# Patient Record
Sex: MALE | Race: OTHER | NOT HISPANIC OR LATINO
[De-identification: names, ages, dates, MRNs, and addresses within clinical notes are randomized per-mention and may not be internally consistent; named-entity substitution may affect disease eponyms.]

---

## 2018-11-08 PROBLEM — Z00.00 ENCOUNTER FOR PREVENTIVE HEALTH EXAMINATION: Status: ACTIVE | Noted: 2018-11-08

## 2018-11-14 ENCOUNTER — APPOINTMENT (OUTPATIENT)
Dept: SURGERY | Facility: CLINIC | Age: 58
End: 2018-11-14
Payer: COMMERCIAL

## 2018-11-14 VITALS
BODY MASS INDEX: 24.75 KG/M2 | OXYGEN SATURATION: 96 % | TEMPERATURE: 97.8 F | WEIGHT: 154 LBS | HEIGHT: 66 IN | DIASTOLIC BLOOD PRESSURE: 89 MMHG | SYSTOLIC BLOOD PRESSURE: 154 MMHG | HEART RATE: 85 BPM

## 2018-11-14 DIAGNOSIS — Z86.79 PERSONAL HISTORY OF OTHER DISEASES OF THE CIRCULATORY SYSTEM: ICD-10-CM

## 2018-11-14 DIAGNOSIS — Z87.19 PERSONAL HISTORY OF OTHER DISEASES OF THE DIGESTIVE SYSTEM: ICD-10-CM

## 2018-11-14 DIAGNOSIS — Z80.41 FAMILY HISTORY OF MALIGNANT NEOPLASM OF OVARY: ICD-10-CM

## 2018-11-14 DIAGNOSIS — K21.9 GASTRO-ESOPHAGEAL REFLUX DISEASE W/OUT ESOPHAGITIS: ICD-10-CM

## 2018-11-14 DIAGNOSIS — Z78.9 OTHER SPECIFIED HEALTH STATUS: ICD-10-CM

## 2018-11-14 DIAGNOSIS — Z86.39 PERSONAL HISTORY OF OTHER ENDOCRINE, NUTRITIONAL AND METABOLIC DISEASE: ICD-10-CM

## 2018-11-14 PROCEDURE — 99203 OFFICE O/P NEW LOW 30 MIN: CPT

## 2018-11-15 ENCOUNTER — OUTPATIENT (OUTPATIENT)
Dept: OUTPATIENT SERVICES | Facility: HOSPITAL | Age: 58
LOS: 1 days | End: 2018-11-15
Payer: COMMERCIAL

## 2018-11-15 ENCOUNTER — APPOINTMENT (OUTPATIENT)
Dept: ULTRASOUND IMAGING | Facility: CLINIC | Age: 58
End: 2018-11-15

## 2018-11-15 ENCOUNTER — APPOINTMENT (OUTPATIENT)
Dept: RADIOLOGY | Facility: HOSPITAL | Age: 58
End: 2018-11-15
Payer: COMMERCIAL

## 2018-11-15 ENCOUNTER — APPOINTMENT (OUTPATIENT)
Dept: ULTRASOUND IMAGING | Facility: HOSPITAL | Age: 58
End: 2018-11-15
Payer: COMMERCIAL

## 2018-11-15 DIAGNOSIS — K80.20 CALCULUS OF GALLBLADDER WITHOUT CHOLECYSTITIS WITHOUT OBSTRUCTION: ICD-10-CM

## 2018-11-15 PROCEDURE — 74241: CPT | Mod: 26

## 2018-11-15 PROCEDURE — 74241: CPT

## 2018-11-15 PROCEDURE — 76700 US EXAM ABDOM COMPLETE: CPT | Mod: 26

## 2018-11-15 PROCEDURE — 76700 US EXAM ABDOM COMPLETE: CPT

## 2018-11-27 RX ORDER — LEVOTHYROXINE SODIUM 0.11 MG/1
112 TABLET ORAL
Refills: 0 | Status: ACTIVE | COMMUNITY

## 2018-11-27 RX ORDER — VALSARTAN 80 MG/1
80 TABLET, COATED ORAL
Refills: 0 | Status: ACTIVE | COMMUNITY

## 2018-11-27 RX ORDER — OLOPATADINE HYDROCHLORIDE 1 MG/ML
0.1 SOLUTION/ DROPS OPHTHALMIC
Refills: 0 | Status: ACTIVE | COMMUNITY

## 2018-11-27 RX ORDER — ATORVASTATIN CALCIUM 20 MG/1
20 TABLET, FILM COATED ORAL
Refills: 0 | Status: ACTIVE | COMMUNITY

## 2018-11-27 RX ORDER — LOSARTAN POTASSIUM 100 MG/1
100 TABLET, FILM COATED ORAL
Refills: 0 | Status: ACTIVE | COMMUNITY

## 2018-11-27 RX ORDER — SUCRALFATE 1 G/1
1 TABLET ORAL
Refills: 0 | Status: ACTIVE | COMMUNITY

## 2018-11-29 ENCOUNTER — TRANSCRIPTION ENCOUNTER (OUTPATIENT)
Age: 58
End: 2018-11-29

## 2018-11-30 ENCOUNTER — CLINICAL ADVICE (OUTPATIENT)
Age: 58
End: 2018-11-30

## 2018-11-30 DIAGNOSIS — R18.8 OTHER ASCITES: ICD-10-CM

## 2018-11-30 DIAGNOSIS — K80.20 CALCULUS OF GALLBLADDER W/OUT CHOLECYSTITIS W/OUT OBSTRUCTION: ICD-10-CM

## 2018-11-30 PROBLEM — Z78.9 NON-SMOKER: Status: ACTIVE | Noted: 2018-11-14

## 2018-11-30 PROBLEM — Z86.39 HISTORY OF DIABETES MELLITUS: Status: RESOLVED | Noted: 2018-11-14 | Resolved: 2018-11-30

## 2018-11-30 PROBLEM — Z80.41 FAMILY HISTORY OF MALIGNANT NEOPLASM OF OVARY: Status: ACTIVE | Noted: 2018-11-14

## 2018-11-30 PROBLEM — K21.9 GASTROESOPHAGEAL REFLUX DISEASE WITHOUT ESOPHAGITIS: Status: ACTIVE | Noted: 2018-11-27

## 2018-11-30 PROBLEM — Z86.39 HISTORY OF THYROID DISORDER: Status: RESOLVED | Noted: 2018-11-14 | Resolved: 2018-11-30

## 2018-11-30 PROBLEM — Z86.79 HISTORY OF HYPERTENSION: Status: RESOLVED | Noted: 2018-11-14 | Resolved: 2018-11-30

## 2018-11-30 PROBLEM — Z87.19 HISTORY OF GASTRITIS: Status: RESOLVED | Noted: 2018-11-14 | Resolved: 2018-11-30

## 2018-12-03 PROBLEM — K80.20 SYMPTOMATIC CHOLELITHIASIS: Status: ACTIVE | Noted: 2018-11-30

## 2018-12-05 ENCOUNTER — APPOINTMENT (OUTPATIENT)
Dept: SURGERY | Facility: CLINIC | Age: 58
End: 2018-12-05
Payer: COMMERCIAL

## 2018-12-05 VITALS
HEIGHT: 66 IN | HEART RATE: 81 BPM | BODY MASS INDEX: 23.63 KG/M2 | TEMPERATURE: 97.8 F | OXYGEN SATURATION: 98 % | SYSTOLIC BLOOD PRESSURE: 135 MMHG | WEIGHT: 147 LBS | DIASTOLIC BLOOD PRESSURE: 83 MMHG

## 2018-12-05 DIAGNOSIS — K80.20 CALCULUS OF GALLBLADDER W/OUT CHOLECYSTITIS W/OUT OBSTRUCTION: ICD-10-CM

## 2018-12-05 PROCEDURE — 99212 OFFICE O/P EST SF 10 MIN: CPT

## 2018-12-11 ENCOUNTER — OUTPATIENT (OUTPATIENT)
Dept: OUTPATIENT SERVICES | Facility: HOSPITAL | Age: 58
LOS: 1 days | End: 2018-12-11
Payer: COMMERCIAL

## 2018-12-11 ENCOUNTER — APPOINTMENT (OUTPATIENT)
Dept: CT IMAGING | Facility: HOSPITAL | Age: 58
End: 2018-12-11
Payer: COMMERCIAL

## 2018-12-11 DIAGNOSIS — R18.8 OTHER ASCITES: ICD-10-CM

## 2018-12-11 PROCEDURE — 74177 CT ABD & PELVIS W/CONTRAST: CPT | Mod: 26

## 2018-12-11 PROCEDURE — 74177 CT ABD & PELVIS W/CONTRAST: CPT

## 2018-12-12 ENCOUNTER — APPOINTMENT (OUTPATIENT)
Dept: SURGICAL ONCOLOGY | Facility: CLINIC | Age: 58
End: 2018-12-12
Payer: COMMERCIAL

## 2018-12-12 VITALS
OXYGEN SATURATION: 96 % | HEART RATE: 72 BPM | TEMPERATURE: 98.4 F | BODY MASS INDEX: 23.63 KG/M2 | WEIGHT: 147 LBS | HEIGHT: 66 IN | SYSTOLIC BLOOD PRESSURE: 137 MMHG | DIASTOLIC BLOOD PRESSURE: 81 MMHG

## 2018-12-12 DIAGNOSIS — Z80.3 FAMILY HISTORY OF MALIGNANT NEOPLASM OF BREAST: ICD-10-CM

## 2018-12-12 DIAGNOSIS — Z84.81 FAMILY HISTORY OF CARRIER OF GENETIC DISEASE: ICD-10-CM

## 2018-12-12 PROCEDURE — 99204 OFFICE O/P NEW MOD 45 MIN: CPT

## 2018-12-12 PROCEDURE — 99214 OFFICE O/P EST MOD 30 MIN: CPT

## 2018-12-28 ENCOUNTER — APPOINTMENT (OUTPATIENT)
Dept: CT IMAGING | Facility: HOSPITAL | Age: 58
End: 2018-12-28

## 2019-01-03 ENCOUNTER — RESULT REVIEW (OUTPATIENT)
Age: 59
End: 2019-01-03

## 2019-01-03 ENCOUNTER — OUTPATIENT (OUTPATIENT)
Dept: OUTPATIENT SERVICES | Facility: HOSPITAL | Age: 59
LOS: 1 days | Discharge: HOME | End: 2019-01-03
Payer: COMMERCIAL

## 2019-01-03 DIAGNOSIS — K38.8 OTHER SPECIFIED DISEASES OF APPENDIX: ICD-10-CM

## 2019-01-03 LAB
BASOPHILS # BLD AUTO: 0.04 K/UL
BASOPHILS NFR BLD AUTO: 0.6 %
EOSINOPHIL # BLD AUTO: 0.24 K/UL
EOSINOPHIL NFR BLD AUTO: 3.3 %
GLUCOSE BLDC GLUCOMTR-MCNC: 74 MG/DL — SIGNIFICANT CHANGE UP (ref 70–99)
HCT VFR BLD CALC: 39 %
HGB BLD-MCNC: 12.5 G/DL
IMM GRANULOCYTES NFR BLD AUTO: 0.1 %
INR PPP: 1.22 RATIO
LYMPHOCYTES # BLD AUTO: 1.61 K/UL
LYMPHOCYTES NFR BLD AUTO: 22.2 %
MAN DIFF?: NORMAL
MCHC RBC-ENTMCNC: 26.9 PG
MCHC RBC-ENTMCNC: 32.1 GM/DL
MCV RBC AUTO: 83.9 FL
MONOCYTES # BLD AUTO: 0.78 K/UL
MONOCYTES NFR BLD AUTO: 10.8 %
NEUTROPHILS # BLD AUTO: 4.57 K/UL
NEUTROPHILS NFR BLD AUTO: 63 %
PLATELET # BLD AUTO: 388 K/UL
PT BLD: 13.8 SEC
RBC # BLD: 4.65 M/UL
RBC # FLD: 15.3 %
WBC # FLD AUTO: 7.25 K/UL

## 2019-01-03 PROCEDURE — 88305 TISSUE EXAM BY PATHOLOGIST: CPT | Mod: 26

## 2019-01-07 LAB — SURGICAL PATHOLOGY STUDY: SIGNIFICANT CHANGE UP

## 2019-01-16 ENCOUNTER — APPOINTMENT (OUTPATIENT)
Dept: SURGICAL ONCOLOGY | Facility: CLINIC | Age: 59
End: 2019-01-16
Payer: COMMERCIAL

## 2019-01-16 VITALS
OXYGEN SATURATION: 97 % | DIASTOLIC BLOOD PRESSURE: 84 MMHG | WEIGHT: 145 LBS | BODY MASS INDEX: 23.3 KG/M2 | TEMPERATURE: 97.7 F | SYSTOLIC BLOOD PRESSURE: 126 MMHG | HEIGHT: 66 IN | HEART RATE: 89 BPM

## 2019-01-16 DIAGNOSIS — K38.8 OTHER SPECIFIED DISEASES OF APPENDIX: ICD-10-CM

## 2019-01-16 DIAGNOSIS — C78.6 SECONDARY MALIGNANT NEOPLASM OF RETROPERITONEUM AND PERITONEUM: ICD-10-CM

## 2019-01-16 PROCEDURE — 99215 OFFICE O/P EST HI 40 MIN: CPT

## 2019-01-18 ENCOUNTER — APPOINTMENT (OUTPATIENT)
Dept: SURGERY | Facility: CLINIC | Age: 59
End: 2019-01-18

## 2019-01-22 NOTE — HISTORY OF PRESENT ILLNESS
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 \par \par \par He underwent a screening colonoscopy with Dr. Kate Irby on 1/3/19 where he was found to have a descending colon polyp, cecal polyp and a descending polyp with benign pathology. Cecal and terminal biopsy was also benign. \par \par PERTINENT HISTORY:\par Initially consulted 12/12/18, referred by Dr. Todd Escalona. \par The patient was seen by Dr. Escalona in November for mild discomfort to the RUQ for many years and significant GERD despite PPI treatment.  Patient is from hospitals. \par \par Abdominal ultrasound performed on 11/15/18 showed moderate amount of very complex ascites within the right and left abdomen causing mass effect and scalloping of the capsule of the liver. CT was then recommended. No evidence of cholecystitis. \par \par Upper GI series performed on 11/15/18 demonstrated a small hiatal hernia and severe GERD. \par \par CT A/P performed on 12/11/18 for Ascites revealed -FINDINGS: Limited sections through the lung bases demonstrate mild  bilateral atelectasis. There is a small bleb in the left lower lobe.  There is a dilated fluid-filled structure adjacent to and inseparable  from the cecum measuring up to 4.5 cm in diameter, suggestive of mucocele  of the appendix. Extensive complete free fluid is noted within the  abdomen and pelvis. There is scalloping at the liver capsule. 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 There is a tiny hypodense lesion in the liver dome, too small  to characterize. Nonspecific subcapsular a 9 mm hyperdense focus in  hepatic segment 4.  Gallstones in the gallbladder. The pancreas, spleen, adrenals, and kidney  appear unremarkable.  No bowel obstruction or grossly thickened bowel. Moderate amount of stool  in the rectum.  No evidence for free air, or enlarged lymph node.   The urinary bladder appears unremarkable. The prostate is enlarged.  CT IMPRESSION: Findings are suggestive of mucocele of the appendix with  associated malignant pseudomyxoma peritonei and associated hepatic  capsular metastatic implants.   9 mm nonspecific hyperdense focus in hepatic segment 4. If clinically  indicated, abdominal MR without and with IV contrast may be pursued for  further evaluation.\par \par PMH notable for HTN, HLD, total thyroidectomy for reportedly benign thyroid nodules 2013, DM2 (on Metformin), knee surgery 1996, gallstones since 1998. Social alcohol use.  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 \par \par Family history of malignancies involves his mother with ovarian cancer at age 72. His 1 sister underwent genetic testing and was found to be BRCA negative. He states that same sister also had a benign pancreatic tumor removed.  His other sister was diagnosed with breast cancer at age 38, s/p chemo and surgery and tested positive for BRCA gene. He denies any other family history of malignancies. \par \par States he had a colonoscopy in 2/2017 and had 3 polyps excised. He also reports a history of H. Pylori for which he was treated for and most recent endoscopy showed gastritis but no H. Pylori. \par \par Today he is with c/o mild/dull, 4/10, intermittent right upper quadrant abdominal pain since 2016. The pain is worse with heavy and fatty meals.  He also repots early satiety, 4 lb weight loss, occasional abdominal bloating. Denies changes in appetite or bowel habits. Denies nausea or vomiting. States he recently retired and his sleeping patterns have been thrown off. He states he now sleeps during the day and stays up a lot of the night.

## 2019-01-22 NOTE — REASON FOR VISIT
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 \par  \par

## 2019-01-22 NOTE — PHYSICAL EXAM
[Normal] : supple, no neck mass and thyroid not enlarged [Normal Groin Lymph Nodes] : normal groin lymph nodes [Normal] : oriented to person, place and time, with appropriate affect [de-identified] : soft, ND, NT

## 2019-01-22 NOTE — ASSESSMENT
[FreeTextEntry1] : IMP:\par CT revealing Mucocele of the appendix with  associated  pseudomyxoma peritonei and associated hepatic  capsular implants. He underwent a screening colonoscopy with Dr. Kate Irby on 1/3/19 where he was found to have a descending colon polyp, cecal polyp and a descending polyp with benign pathology. Cecal and terminal ileum biopsy was also benign. \par \par \par PLAN:\par Discussed with - pt PCP for clearance. \par Will order PET scan to rule out distant disease\par Will schedule for exlap right colectomy, cytoreductive surgery and HIPEC\par I have discussed the diagnosis, therapeutic plan and options with the patient at length. Patient expressed verbal understanding to proceed with proposed plan. All questions answered.\par I have discussed the risks, benefits, alternatives, complications including but not limited bleeding, infection, damage to adjacent structures, recurrence to the patient in detail. Patient expressed verbal understanding. Written informed consent to be obtained in the preoperative period.\par

## 2019-01-22 NOTE — CONSULT LETTER
[Dear  ___] : Dear  [unfilled], [Consult Letter:] : I had the pleasure of evaluating your patient, [unfilled]. [( Thank you for referring [unfilled] for consultation for _____ )] : Thank you for referring [unfilled] for consultation for [unfilled] [Please see my note below.] : Please see my note below. [Consult Closing:] : Thank you very much for allowing me to participate in the care of this patient.  If you have any questions, please do not hesitate to contact me. [Sincerely,] : Sincerely, [DrHannah  ___] : Dr. HELMS [FreeTextEntry3] : Rik Qureshi MD, FICS, FACS\par , Surgical Oncology\par [facility]\par [address]\[place], [place]- [address]\par \par [address]\[place]- [address]\par \par (mob) [phone]\par (o) [phone]\par (f) [phone]\par

## 2019-01-31 ENCOUNTER — FORM ENCOUNTER (OUTPATIENT)
Age: 59
End: 2019-01-31

## 2019-02-01 ENCOUNTER — APPOINTMENT (OUTPATIENT)
Dept: NUCLEAR MEDICINE | Facility: IMAGING CENTER | Age: 59
End: 2019-02-01
Payer: COMMERCIAL

## 2019-02-01 ENCOUNTER — OUTPATIENT (OUTPATIENT)
Dept: OUTPATIENT SERVICES | Facility: HOSPITAL | Age: 59
LOS: 1 days | End: 2019-02-01
Payer: COMMERCIAL

## 2019-02-01 DIAGNOSIS — C78.6 SECONDARY MALIGNANT NEOPLASM OF RETROPERITONEUM AND PERITONEUM: ICD-10-CM

## 2019-02-01 PROCEDURE — 78815 PET IMAGE W/CT SKULL-THIGH: CPT

## 2019-02-01 PROCEDURE — 78815 PET IMAGE W/CT SKULL-THIGH: CPT | Mod: 26,PI

## 2019-02-01 PROCEDURE — A9552: CPT

## 2019-02-04 RX ORDER — POLYETHYLENE GLYCOL 3350, SODIUM SULFATE, SODIUM CHLORIDE, POTASSIUM CHLORIDE, ASCORBIC ACID, SODIUM ASCORBATE 7.5-2.691G
100 KIT ORAL
Qty: 1 | Refills: 0 | Status: ACTIVE | COMMUNITY
Start: 2019-02-04 | End: 1900-01-01

## 2019-02-04 RX ORDER — METRONIDAZOLE 250 MG/1
250 TABLET ORAL AS DIRECTED
Qty: 3 | Refills: 0 | Status: ACTIVE | COMMUNITY
Start: 2019-02-04 | End: 1900-01-01

## 2019-02-08 ENCOUNTER — OUTPATIENT (OUTPATIENT)
Dept: OUTPATIENT SERVICES | Facility: HOSPITAL | Age: 59
LOS: 1 days | End: 2019-02-08

## 2019-02-08 VITALS
HEART RATE: 107 BPM | SYSTOLIC BLOOD PRESSURE: 118 MMHG | OXYGEN SATURATION: 99 % | WEIGHT: 149.03 LBS | DIASTOLIC BLOOD PRESSURE: 80 MMHG | RESPIRATION RATE: 16 BRPM | HEIGHT: 67 IN | TEMPERATURE: 98 F

## 2019-02-08 DIAGNOSIS — C78.6 SECONDARY MALIGNANT NEOPLASM OF RETROPERITONEUM AND PERITONEUM: ICD-10-CM

## 2019-02-08 DIAGNOSIS — Z98.890 OTHER SPECIFIED POSTPROCEDURAL STATES: Chronic | ICD-10-CM

## 2019-02-08 DIAGNOSIS — E11.9 TYPE 2 DIABETES MELLITUS WITHOUT COMPLICATIONS: ICD-10-CM

## 2019-02-08 DIAGNOSIS — R06.83 SNORING: ICD-10-CM

## 2019-02-08 DIAGNOSIS — E03.9 HYPOTHYROIDISM, UNSPECIFIED: ICD-10-CM

## 2019-02-08 LAB
BLD GP AB SCN SERPL QL: NEGATIVE — SIGNIFICANT CHANGE UP
RH IG SCN BLD-IMP: POSITIVE — SIGNIFICANT CHANGE UP

## 2019-02-08 RX ORDER — SODIUM CHLORIDE 9 MG/ML
1000 INJECTION, SOLUTION INTRAVENOUS
Qty: 0 | Refills: 0 | Status: DISCONTINUED | OUTPATIENT
Start: 2019-02-22 | End: 2019-02-22

## 2019-02-08 RX ORDER — SODIUM CHLORIDE 9 MG/ML
3 INJECTION INTRAMUSCULAR; INTRAVENOUS; SUBCUTANEOUS EVERY 8 HOURS
Qty: 0 | Refills: 0 | Status: DISCONTINUED | OUTPATIENT
Start: 2019-02-22 | End: 2019-03-06

## 2019-02-08 NOTE — H&P PST ADULT - PMH
Diabetes mellitus    Gallstones    Gastritis    Hyperlipidemia    Hypertension    Thyroid nodule  benign  Tuberculosis exposure  1996 - treated with medicaiton 10 months Diabetes mellitus    Gallstones    Gastritis    Hyperlipidemia    Hypertension    Thyroid nodule  benign  Tuberculosis exposure  1996 - treated with medication 10 months

## 2019-02-08 NOTE — H&P PST ADULT - PROBLEM SELECTOR PLAN 1
Exploratory Laparotomy, Right Colectomy Debulking Cholecystectomy, Heated Intraperitoneal Chemotherapy scheduled on 2/22/19.   Pre-op instructions provided. Pt verbalized understanding.   Pepcid provided for GI prophylaxis.   Chlorhexidine wash provided and instructions given.   Pt went for medical clearance per surgeon's request.

## 2019-02-08 NOTE — H&P PST ADULT - PROBLEM SELECTOR PLAN 3
Levels being monitored by PMD.  Pt instructed to take his thyroid medication the morning of surgery.

## 2019-02-08 NOTE — H&P PST ADULT - GIT GEN HX ROS MEA POS PC
abdominal pain/right upper and occasional left upper quardant pain right upper and occasional left upper quadrant pain/abdominal pain

## 2019-02-08 NOTE — H&P PST ADULT - HISTORY OF PRESENT ILLNESS
58 year old male with hx of gallstones with c/o right abdominal pain, had u/s which found fluid in abdomin. Pt had f/u CT scan which revealed "mucocele of appendix with associated malignant pseudomyxoma peritonei" Pt presents today for presurgical evaluation for ... 58 year old male with hx of gallstones with c/o right abdominal pain, had u/s which found fluid in abdomin. Pt had f/u CT scan which revealed "mucocele of appendix with associated malignant pseudomyxoma peritonei" Pt presents today for presurgical evaluation for Exploratory Laparotomy, Right Colectomy Debulking Cholecystectomy, Heated Intraperitoneal Chemotherapy scheduled on 2/22/19.

## 2019-02-08 NOTE — H&P PST ADULT - MUSCULOSKELETAL
details… detailed exam no joint warmth/normal strength/ROM intact/no joint swelling/no joint erythema/no calf tenderness

## 2019-02-14 PROBLEM — K29.70 GASTRITIS, UNSPECIFIED, WITHOUT BLEEDING: Chronic | Status: ACTIVE | Noted: 2019-02-08

## 2019-02-14 PROBLEM — E78.5 HYPERLIPIDEMIA, UNSPECIFIED: Chronic | Status: ACTIVE | Noted: 2019-02-08

## 2019-02-14 PROBLEM — Z20.1 CONTACT WITH AND (SUSPECTED) EXPOSURE TO TUBERCULOSIS: Chronic | Status: ACTIVE | Noted: 2019-02-08

## 2019-02-14 PROBLEM — K80.20 CALCULUS OF GALLBLADDER WITHOUT CHOLECYSTITIS WITHOUT OBSTRUCTION: Chronic | Status: ACTIVE | Noted: 2019-02-08

## 2019-02-19 ENCOUNTER — RX RENEWAL (OUTPATIENT)
Age: 59
End: 2019-02-19

## 2019-02-19 RX ORDER — NEOMYCIN SULFATE 500 MG/1
500 TABLET ORAL
Qty: 6 | Refills: 0 | Status: ACTIVE | COMMUNITY
Start: 2019-02-04 | End: 1900-01-01

## 2019-02-21 ENCOUNTER — TRANSCRIPTION ENCOUNTER (OUTPATIENT)
Age: 59
End: 2019-02-21

## 2019-02-21 NOTE — ASU PATIENT PROFILE, ADULT - PMH
Diabetes mellitus    Gallstones    Gastritis    Hyperlipidemia    Hypertension    Thyroid nodule  benign  Tuberculosis exposure  1996 - treated with medication 10 months

## 2019-02-21 NOTE — ASU PATIENT PROFILE, ADULT - MEDICATION ADMINISTRATION INFO, PROFILE
History  Chief Complaint   Patient presents with    Extremity Weakness     worsening b/l leg weakness for the past couple of days  Patient presents emergency department with weakness  He was going to go to the family doctor's today and when his neighbor went to pick him up they could not get him in the car because he was so weak and so they called 911  Patient reports that his legs just feel very weak and he feels very weak the past couple of days in a keeps getting worse  Patient lives at home alone  Patient denies any other symptoms he is not having any headaches or dizziness  He denies any chest pain or difficulty breathing or any coughing or fevers  Patient was transported via EMS  Room air oxygen was initially in the 60s with high-flow to we got him into the 80s and then ultimately to 90 BiPAP was then started on the patient providing better oxygenation  Discussed case with Dr Roa - jaz also saw and evaluated the patient  No fall, dizziness or syncope  Prior to Admission Medications   Prescriptions Last Dose Informant Patient Reported? Taking?    IRON PO 8/2/2018 at Unknown time Self Yes Yes   Sig: Take 324 mg by mouth 2 (two) times a day     MENTHOL-METHYL SALICYLATE EX 6/4/6412 at Unknown time  Yes Yes   Sig: Apply topically 2 (two) times a day   Nutritional Supplements (ENSURE PLUS) LIQD 8/2/2018 at Unknown time Self Yes Yes   Sig: Take 1 Can by mouth 2 (two) times a day     acetaminophen (TYLENOL) 325 mg tablet Unknown at Unknown time  Yes No   Sig: Take 650 mg by mouth every 6 (six) hours as needed for mild pain   aspirin 325 mg tablet 8/2/2018 at Unknown time Self Yes Yes   Sig: Take 81 mg by mouth daily     bisacodyl (DULCOLAX) 5 mg EC tablet Unknown at Unknown time  Yes No   Sig: Take 5 mg by mouth daily as needed for constipation   cetirizine (ZyrTEC) 5 MG tablet Unknown at Unknown time  Yes No   Sig: Take 5 mg by mouth daily as needed for allergies   docusate sodium (COLACE) 100 mg capsule Unknown at Unknown time  Yes No   Sig: Take 100 mg by mouth daily as needed for constipation   finasteride (PROSCAR) 5 mg tablet 2018 at Unknown time Self Yes Yes   Sig: Take 5 mg by mouth daily   fluticasone (FLONASE) 50 mcg/act nasal spray 2018 at Unknown time  Yes Yes   Si spray into each nostril 2 (two) times a day   latanoprost (XALATAN) 0 005 % ophthalmic solution 2018 at Unknown time Self Yes Yes   Sig: Administer 1 drop into the left eye daily     lisinopril (ZESTRIL) 20 mg tablet 2018 at Unknown time Self Yes Yes   Sig: Take 20 mg by mouth 2 (two) times a day   omeprazole (PriLOSEC) 10 mg delayed release capsule 2018 at Unknown time Self Yes Yes   Sig: Take 20 mg by mouth daily   simvastatin (ZOCOR) 80 mg tablet 2018 at Unknown time Self Yes Yes   Sig: Take 40 mg by mouth daily at bedtime   timolol (TIMOPTIC) 0 25 % ophthalmic solution 2018 at Unknown time Self Yes Yes   Sig: Administer 1 drop into the left eye daily        Facility-Administered Medications: None       Past Medical History:   Diagnosis Date    Anemia     Cardiac disease     GERD (gastroesophageal reflux disease)     History of BPH        History reviewed  No pertinent surgical history  History reviewed  No pertinent family history  I have reviewed and agree with the history as documented  Social History   Substance Use Topics    Smoking status: Never Smoker    Smokeless tobacco: Never Used    Alcohol use No        Review of Systems   All other systems reviewed and are negative  Physical Exam  Physical Exam   Constitutional: He is oriented to person, place, and time  He appears well-developed and well-nourished  HENT:   Head: Normocephalic and atraumatic  Right Ear: External ear normal    Mouth/Throat: Oropharynx is clear and moist    Eyes: Conjunctivae and EOM are normal    Neck: Neck supple  Cardiovascular: Normal rate, regular rhythm and normal heart sounds  Pulmonary/Chest:   Markedly diminished breath sounds throughout  Abdominal: Soft  Bowel sounds are normal    Musculoskeletal: He exhibits edema  Bilateral pitting edema to both extremities  Neurological: He is alert and oriented to person, place, and time  Patient is able to answer all of my questions  Skin: Skin is warm  Psychiatric: He has a normal mood and affect  Nursing note and vitals reviewed        Vital Signs  ED Triage Vitals   Temperature Pulse Respirations Blood Pressure SpO2   08/02/18 1035 08/02/18 1035 08/02/18 1035 08/02/18 1035 08/02/18 1035   98 3 °F (36 8 °C) 64 20 153/68 (!) 66 %      Temp Source Heart Rate Source Patient Position - Orthostatic VS BP Location FiO2 (%)   08/02/18 1035 08/02/18 1035 08/02/18 1035 08/02/18 1035 08/02/18 1100   Oral Monitor Lying Right arm 70      Pain Score       08/02/18 1035       No Pain           Vitals:    08/02/18 1200 08/02/18 1230 08/02/18 1245 08/02/18 1300   BP: 137/63 170/72 135/60 135/60   Pulse: (!) 52 (!) 54 (!) 54 (!) 50   Patient Position - Orthostatic VS: Lying Sitting Lying Lying       Visual Acuity  Visual Acuity      Most Recent Value   L Pupil Size (mm)  3   R Pupil Size (mm)  3          ED Medications  Medications   chlorhexidine (PERIDEX) 0 12 % oral rinse 15 mL (not administered)   heparin (porcine) subcutaneous injection 5,000 Units (not administered)   aspirin tablet 325 mg (not administered)   Iron TABS 324 mg (not administered)   ENSURE PLUS LIQD 237 mL (not administered)   finasteride (PROSCAR) tablet 5 mg (not administered)   latanoprost (XALATAN) 0 005 % ophthalmic solution 1 drop (not administered)   pantoprazole (PROTONIX) EC tablet 20 mg (not administered)   atorvastatin (LIPITOR) tablet 40 mg (not administered)   timolol (TIMOPTIC) 0 25 % ophthalmic solution 1 drop (not administered)   lisinopril (ZESTRIL) tablet 20 mg (not administered)   fluticasone (FLONASE) 50 mcg/act nasal spray 1 spray (not administered) docusate sodium (COLACE) capsule 100 mg (not administered)   bisacodyl (DULCOLAX) EC tablet 5 mg (not administered)   acetaminophen (TYLENOL) tablet 650 mg (not administered)   furosemide (LASIX) injection 40 mg (not administered)   aspirin chewable tablet 243 mg (243 mg Oral Given 8/2/18 1157)   furosemide (LASIX) injection 40 mg (40 mg Intravenous Given 8/2/18 1205)       Diagnostic Studies  Results Reviewed     Procedure Component Value Units Date/Time    Troponin I [[id number]] Collected:  08/02/18 1303    Lab Status:   In process Specimen:  Blood from Arm, Left Updated:  08/02/18 1306    Troponin I [[id number]]     Lab Status:  No result Specimen:  Blood     Platelet count [[id number]]     Lab Status:  No result Specimen:  Blood     Urine Microscopic [[id number]]  (Normal) Collected:  08/02/18 1155    Lab Status:  Final result Specimen:  Urine from Urine, Clean Catch Updated:  08/02/18 1228     RBC, UA None Seen /hpf      WBC, UA None Seen /hpf      Epithelial Cells Occasional /hpf      Bacteria, UA None Seen /hpf     B-type natriuretic peptide [[id number]]  (Abnormal) Collected:  08/02/18 1052    Lab Status:  Final result Specimen:  Blood from Arm, Right Updated:  08/02/18 1155     NT-proBNP 19,286 (H) pg/mL     POCT urinalysis dipstick [[id number]]  (Abnormal) Resulted:  08/02/18 1149    Lab Status:  Final result Specimen:  Urine Updated:  08/02/18 1149    ED Urine Macroscopic [[id number]]  (Abnormal) Collected:  08/02/18 1155    Lab Status:  Final result Specimen:  Urine Updated:  08/02/18 1148     Color, UA Yellow     Clarity, UA Slightly Cloudy     pH, UA 5 0     Leukocytes, UA Negative     Nitrite, UA Negative     Protein, UA 30 (1+) (A) mg/dl      Glucose, UA Negative mg/dl      Ketones, UA Negative mg/dl      Urobilinogen, UA 1 0 E U /dl      Bilirubin, UA Interference- unable to analyze (A)     Blood, UA Negative     Specific Gravity, UA 1 025    Narrative:       CLINITEK RESULT    Troponin I [[id number]]  (Abnormal) Collected:  08/02/18 1051    Lab Status:  Final result Specimen:  Blood from Arm, Right Updated:  08/02/18 1135     Troponin I 0 17 (H) ng/mL     Lactic acid, plasma [[id number]]  (Normal) Collected:  08/02/18 1050    Lab Status:  Final result Specimen:  Blood from Arm, Right Updated:  08/02/18 1134     LACTIC ACID 1 8 mmol/L     Narrative:         Result may be elevated if tourniquet was used during collection  Comprehensive metabolic panel [[id number]]  (Abnormal) Collected:  08/02/18 1052    Lab Status:  Final result Specimen:  Blood from Arm, Right Updated:  08/02/18 1130     Sodium 142 mmol/L      Potassium 5 0 mmol/L      Chloride 102 mmol/L      CO2 37 (H) mmol/L      Anion Gap 3 (L) mmol/L      BUN 29 (H) mg/dL      Creatinine 1 24 mg/dL      Glucose 114 mg/dL      Calcium 8 8 mg/dL      AST 38 U/L      ALT 41 U/L      Alkaline Phosphatase 77 U/L      Total Protein 6 7 g/dL      Albumin 3 0 (L) g/dL      Total Bilirubin 0 51 mg/dL      eGFR 48 ml/min/1 73sq m     Narrative:         National Kidney Disease Education Program recommendations are as follows:  GFR calculation is accurate only with a steady state creatinine  Chronic Kidney disease less than 60 ml/min/1 73 sq  meters  Kidney failure less than 15 ml/min/1 73 sq  meters      Blood gas, arterial [[id number]]  (Abnormal) Collected:  08/02/18 1116    Lab Status:  Final result Specimen:  Blood, Arterial from Radial, Right Updated:  08/02/18 1124     pH, Arterial 7 297 (L)     pCO2, Arterial 68 5 (HH) mm Hg      pO2, Arterial 94 5 mm Hg      HCO3, Arterial 32 7 (H) mmol/L      Base Excess, Arterial 4 0 mmol/L      O2 Content, Arterial 19 1 mL/dL      O2 HGB,Arterial  95 4 %      SOURCE Radial, Right     ASHLEY TEST Yes     Non Vent type BIPAP BIPAP     IPAP 12     EPAP 5     BIPAP fio2 70 %     D-Dimer [[id number]]  (Abnormal) Collected:  08/02/18 1054    Lab Status:  Final result Specimen:  Blood from Arm, Right Updated:  08/02/18 1122     D-Dimer, Quant 717 (H) ng/ml (FEU)     Protime-INR [[id number]]  (Normal) Collected:  08/02/18 1054    Lab Status:  Final result Specimen:  Blood from Arm, Right Updated:  08/02/18 1116     Protime 13 7 seconds      INR 1 04    APTT [[id number]]  (Normal) Collected:  08/02/18 1054    Lab Status:  Final result Specimen:  Blood from Arm, Right Updated:  08/02/18 1116     PTT 34 seconds     CBC and differential [[id number]]  (Abnormal) Collected:  08/02/18 1050    Lab Status:  Final result Specimen:  Blood from Arm, Right Updated:  08/02/18 1107     WBC 8 12 Thousand/uL      RBC 4 94 Million/uL      Hemoglobin 14 4 g/dL      Hematocrit 48 7 %      MCV 99 (H) fL      MCH 29 1 pg      MCHC 29 6 (L) g/dL      RDW 16 0 (H) %      MPV 9 1 fL      Platelets 751 Thousands/uL      Neutrophils Relative 75 %      Lymphocytes Relative 15 %      Monocytes Relative 10 %      Eosinophils Relative 1 %      Basophils Relative 0 %      Neutrophils Absolute 6 09 Thousands/µL      Lymphocytes Absolute 1 20 Thousands/µL      Monocytes Absolute 0 77 Thousand/µL      Eosinophils Absolute 0 05 Thousand/µL      Basophils Absolute 0 01 Thousands/µL     Blood culture #1 [[id number]] Collected:  08/02/18 1100    Lab Status: In process Specimen:  Blood from Arm, Left Updated:  08/02/18 1103    Blood culture #2 [[id number]] Collected:  08/02/18 1053    Lab Status: In process Specimen:  Blood from Arm, Right Updated:  08/02/18 1058                 XR chest 1 view portable   ED Interpretation by Héctor Payton PA-C (08/02 1105)   Questionable pneumonia lll      Final Result by Jorge A Holcomb MD (08/02 1205)      Bibasilar atelectasis              Workstation performed: [id number]                    Procedures  ECG 12 Lead Documentation  Date/Time: 8/2/2018 10:45 AM  Performed by: CELSO Holland  Authorized by: Jean-Paul ERIC     Patient location:  ED  Previous ECG:     Previous ECG:  Unavailable  Rate:     ECG rate:  62  Rhythm:     Rhythm: sinus rhythm    Ectopy: Ectopy: none    QRS:     QRS axis:  Normal  Conduction:     Conduction: normal    ST segments:     ST segments:  Normal  T waves:     T waves: inverted      Inverted:  III, V1, V2, V3, V4 and V5  ECG 12 Lead Documentation  Date/Time: 8/2/2018 1:05 PM  Performed by: CELSO Sauer  Authorized by: Sonya ERIC     Patient location:  ED  Previous ECG:     Previous ECG:  Compared to current    Comparison ECG info:  Earlier today    Similarity:  No change  Rate:     ECG rate:  54    ECG rate assessment: bradycardic    Rhythm:     Rhythm: sinus bradycardia    Ectopy:     Ectopy: none    QRS:     QRS axis:  Normal  ST segments:     ST segments:  Normal  T waves:     T waves: inverted      Inverted:  V5, V4, V2, V1, V3 and III           Phone Contacts  ED Phone Contact    ED Course  ED Course as of Aug 02 1329   Thu Aug 02, 2018   1125 Age adjusting for D dimmer - ok for pt age    16 Cardiology here discussed case with Cardiology as well  MDM  Number of Diagnoses or Management Options  Acute heart failure ([facility] [address] ): new and requires workup  Hypoxia: new and requires workup     Amount and/or Complexity of Data Reviewed  Clinical lab tests: reviewed  Tests in the radiology section of CPT®: reviewed  Discuss the patient with other providers: yes  Independent visualization of images, tracings, or specimens: yes    Patient Progress  Patient progress: stable    The patient presented with a condition in which there was a high probability of imminent or life-threatening deterioration, and critical care services (excluding separately billable procedures) totalled 30-74 minutes          Disposition  Final diagnoses:   Acute heart failure ([facility] [address] )   Hypoxia     Time reflects when diagnosis was documented in both MDM as applicable and the Disposition within this note     Time User Action Codes Description Comment    8/2/2018 12:09 PM Xin May Add [R00 1] Sinus bradycardia     8/2/2018 12:09 PM John Ibrahim Modify [R00 1] Sinus bradycardia     8/2/2018 12:09 PM Johnsohan Ibrahim Modify [R00 1] Sinus bradycardia     8/2/2018 12:09 PM Johnsohan Ibrahim Add [I10] Essential hypertension     8/2/2018 12:09 PM Longarini, [address] Essential hypertension     8/2/2018 12:10 PM Longarini, [address] Essential hypertension     8/2/2018 12:10 PM Johnsohan Ibrahim Remove [R00 1] Sinus bradycardia     8/2/2018 12:10 PM Longarini, [address] Essential hypertension     8/2/2018 12:11 PM John Ibrahim Add [I50 41] Acute combined systolic and diastolic congestive heart failure ([facility] [address] )     8/2/2018 12:15 PM Tanesha Galdamez Add [I50 9] Acute heart failure ([facility] [address] )     8/2/2018 12:15 PM Tanesha Galdamez Add [R09 02] Hypoxia     8/2/2018 12:31 PM John Ibrahim Add [I25 10] Coronary artery disease involving native heart without angina pectoris, unspecified vessel or lesion type     8/2/2018 12:31 PM John Ibrahim Modify [I25 10] Coronary artery disease involving native heart without angina pectoris, unspecified vessel or lesion type       ED Disposition     ED Disposition Condition Comment    Admit  Case was discussed with DR Batsheva Lord  and the patient's admission status was agreed to be admission to the service of Dr Darrion Newsome    None         Current Discharge Medication List      CONTINUE these medications which have NOT CHANGED    Details   aspirin 325 mg tablet Take 81 mg by mouth daily        finasteride (PROSCAR) 5 mg tablet Take 5 mg by mouth daily      fluticasone (FLONASE) 50 mcg/act nasal spray 1 spray into each nostril 2 (two) times a day      IRON PO Take 324 mg by mouth 2 (two) times a day        latanoprost (XALATAN) 0 005 % ophthalmic solution Administer 1 drop into the left eye daily        lisinopril (ZESTRIL) 20 mg tablet Take 20 mg by mouth 2 (two) times a day      MENTHOL-METHYL SALICYLATE EX Apply topically 2 (two) times a day Nutritional Supplements (ENSURE PLUS) LIQD Take 1 Can by mouth 2 (two) times a day        omeprazole (PriLOSEC) 10 mg delayed release capsule Take 20 mg by mouth daily      simvastatin (ZOCOR) 80 mg tablet Take 40 mg by mouth daily at bedtime      timolol (TIMOPTIC) 0 25 % ophthalmic solution Administer 1 drop into the left eye daily        acetaminophen (TYLENOL) 325 mg tablet Take 650 mg by mouth every 6 (six) hours as needed for mild pain      bisacodyl (DULCOLAX) 5 mg EC tablet Take 5 mg by mouth daily as needed for constipation      cetirizine (ZyrTEC) 5 MG tablet Take 5 mg by mouth daily as needed for allergies      docusate sodium (COLACE) 100 mg capsule Take 100 mg by mouth daily as needed for constipation           No discharge procedures on file      ED Provider  Electronically Signed by           Kody Ying PA-C  08/02/18 8953 no concerns

## 2019-02-22 ENCOUNTER — APPOINTMENT (OUTPATIENT)
Dept: SURGICAL ONCOLOGY | Facility: HOSPITAL | Age: 59
End: 2019-02-22

## 2019-02-22 ENCOUNTER — TRANSCRIPTION ENCOUNTER (OUTPATIENT)
Age: 59
End: 2019-02-22

## 2019-02-22 ENCOUNTER — RESULT REVIEW (OUTPATIENT)
Age: 59
End: 2019-02-22

## 2019-02-22 ENCOUNTER — INPATIENT (INPATIENT)
Facility: HOSPITAL | Age: 59
LOS: 11 days | Discharge: HOME HEALTH | End: 2019-03-06
Attending: SURGERY | Admitting: SURGERY
Payer: COMMERCIAL

## 2019-02-22 VITALS
HEART RATE: 98 BPM | RESPIRATION RATE: 16 BRPM | OXYGEN SATURATION: 97 % | TEMPERATURE: 98 F | WEIGHT: 149.03 LBS | DIASTOLIC BLOOD PRESSURE: 76 MMHG | HEIGHT: 67 IN | SYSTOLIC BLOOD PRESSURE: 125 MMHG

## 2019-02-22 DIAGNOSIS — C78.6 SECONDARY MALIGNANT NEOPLASM OF RETROPERITONEUM AND PERITONEUM: ICD-10-CM

## 2019-02-22 DIAGNOSIS — Z98.890 OTHER SPECIFIED POSTPROCEDURAL STATES: Chronic | ICD-10-CM

## 2019-02-22 LAB
ANION GAP SERPL CALC-SCNC: 18 MMO/L — HIGH (ref 7–14)
APTT BLD: 29.1 SEC — SIGNIFICANT CHANGE UP (ref 27.5–36.3)
BASE EXCESS BLDA CALC-SCNC: -1.9 MMOL/L — SIGNIFICANT CHANGE UP
BASE EXCESS BLDA CALC-SCNC: -2.1 MMOL/L — SIGNIFICANT CHANGE UP
BASE EXCESS BLDA CALC-SCNC: -4.4 MMOL/L — SIGNIFICANT CHANGE UP
BASE EXCESS BLDA CALC-SCNC: 0.8 MMOL/L — SIGNIFICANT CHANGE UP
BUN SERPL-MCNC: 12 MG/DL
BUN SERPL-MCNC: 15 MG/DL — SIGNIFICANT CHANGE UP (ref 7–23)
CA-I BLDA-SCNC: 1.17 MMOL/L — SIGNIFICANT CHANGE UP (ref 1.15–1.29)
CA-I BLDA-SCNC: 1.18 MMOL/L — SIGNIFICANT CHANGE UP (ref 1.15–1.29)
CA-I BLDA-SCNC: 1.24 MMOL/L — SIGNIFICANT CHANGE UP (ref 1.15–1.29)
CA-I BLDA-SCNC: 1.37 MMOL/L — HIGH (ref 1.15–1.29)
CALCIUM SERPL-MCNC: 8.4 MG/DL — SIGNIFICANT CHANGE UP (ref 8.4–10.5)
CHLORIDE SERPL-SCNC: 101 MMOL/L — SIGNIFICANT CHANGE UP (ref 98–107)
CO2 SERPL-SCNC: 23 MMOL/L — SIGNIFICANT CHANGE UP (ref 22–31)
CREAT SERPL-MCNC: 0.6 MG/DL — SIGNIFICANT CHANGE UP (ref 0.5–1.3)
CREAT SERPL-MCNC: 0.67 MG/DL
GLUCOSE BLDA-MCNC: 77 MG/DL — SIGNIFICANT CHANGE UP (ref 70–99)
GLUCOSE BLDA-MCNC: 86 MG/DL — SIGNIFICANT CHANGE UP (ref 70–99)
GLUCOSE BLDA-MCNC: 98 MG/DL — SIGNIFICANT CHANGE UP (ref 70–99)
GLUCOSE BLDA-MCNC: 98 MG/DL — SIGNIFICANT CHANGE UP (ref 70–99)
GLUCOSE SERPL-MCNC: 129 MG/DL — HIGH (ref 70–99)
HCO3 BLDA-SCNC: 21 MMOL/L — LOW (ref 22–26)
HCO3 BLDA-SCNC: 23 MMOL/L — SIGNIFICANT CHANGE UP (ref 22–26)
HCO3 BLDA-SCNC: 23 MMOL/L — SIGNIFICANT CHANGE UP (ref 22–26)
HCO3 BLDA-SCNC: 25 MMOL/L — SIGNIFICANT CHANGE UP (ref 22–26)
HCT VFR BLD CALC: 29.7 % — LOW (ref 39–50)
HCT VFR BLD CALC: 30.6 % — LOW (ref 39–50)
HCT VFR BLDA CALC: 26.9 % — LOW (ref 39–51)
HCT VFR BLDA CALC: 28.6 % — LOW (ref 39–51)
HCT VFR BLDA CALC: 30.3 % — LOW (ref 39–51)
HCT VFR BLDA CALC: 34.1 % — LOW (ref 39–51)
HGB BLD-MCNC: 9.5 G/DL — LOW (ref 13–17)
HGB BLD-MCNC: 9.6 G/DL — LOW (ref 13–17)
HGB BLDA-MCNC: 11.1 G/DL — LOW (ref 13–17)
HGB BLDA-MCNC: 8.6 G/DL — LOW (ref 13–17)
HGB BLDA-MCNC: 9.2 G/DL — LOW (ref 13–17)
HGB BLDA-MCNC: 9.8 G/DL — LOW (ref 13–17)
INR BLD: 1.52 — HIGH (ref 0.88–1.17)
MAGNESIUM SERPL-MCNC: 1.9 MG/DL — SIGNIFICANT CHANGE UP (ref 1.6–2.6)
MCHC RBC-ENTMCNC: 25.4 PG — LOW (ref 27–34)
MCHC RBC-ENTMCNC: 25.8 PG — LOW (ref 27–34)
MCHC RBC-ENTMCNC: 31.4 % — LOW (ref 32–36)
MCHC RBC-ENTMCNC: 32 % — SIGNIFICANT CHANGE UP (ref 32–36)
MCV RBC AUTO: 80.7 FL — SIGNIFICANT CHANGE UP (ref 80–100)
MCV RBC AUTO: 81 FL — SIGNIFICANT CHANGE UP (ref 80–100)
NRBC # FLD: 0 K/UL — LOW (ref 25–125)
NRBC # FLD: 0 K/UL — LOW (ref 25–125)
PCO2 BLDA: 36 MMHG — SIGNIFICANT CHANGE UP (ref 35–48)
PCO2 BLDA: 37 MMHG — SIGNIFICANT CHANGE UP (ref 35–48)
PCO2 BLDA: 39 MMHG — SIGNIFICANT CHANGE UP (ref 35–48)
PCO2 BLDA: 42 MMHG — SIGNIFICANT CHANGE UP (ref 35–48)
PH BLDA: 7.31 PH — LOW (ref 7.35–7.45)
PH BLDA: 7.39 PH — SIGNIFICANT CHANGE UP (ref 7.35–7.45)
PH BLDA: 7.4 PH — SIGNIFICANT CHANGE UP (ref 7.35–7.45)
PH BLDA: 7.42 PH — SIGNIFICANT CHANGE UP (ref 7.35–7.45)
PHOSPHATE SERPL-MCNC: 3.5 MG/DL — SIGNIFICANT CHANGE UP (ref 2.5–4.5)
PLATELET # BLD AUTO: 339 K/UL — SIGNIFICANT CHANGE UP (ref 150–400)
PLATELET # BLD AUTO: 355 K/UL — SIGNIFICANT CHANGE UP (ref 150–400)
PMV BLD: 10.5 FL — SIGNIFICANT CHANGE UP (ref 7–13)
PMV BLD: 10.7 FL — SIGNIFICANT CHANGE UP (ref 7–13)
PO2 BLDA: 208 MMHG — HIGH (ref 83–108)
PO2 BLDA: 212 MMHG — HIGH (ref 83–108)
PO2 BLDA: 226 MMHG — HIGH (ref 83–108)
PO2 BLDA: 380 MMHG — HIGH (ref 83–108)
POTASSIUM BLDA-SCNC: 3.2 MMOL/L — LOW (ref 3.4–4.5)
POTASSIUM BLDA-SCNC: 3.5 MMOL/L — SIGNIFICANT CHANGE UP (ref 3.4–4.5)
POTASSIUM BLDA-SCNC: 3.8 MMOL/L — SIGNIFICANT CHANGE UP (ref 3.4–4.5)
POTASSIUM BLDA-SCNC: 4 MMOL/L — SIGNIFICANT CHANGE UP (ref 3.4–4.5)
POTASSIUM SERPL-MCNC: 3.9 MMOL/L — SIGNIFICANT CHANGE UP (ref 3.5–5.3)
POTASSIUM SERPL-SCNC: 3.9 MMOL/L — SIGNIFICANT CHANGE UP (ref 3.5–5.3)
PROTHROM AB SERPL-ACNC: 17.1 SEC — HIGH (ref 9.8–13.1)
RBC # BLD: 3.68 M/UL — LOW (ref 4.2–5.8)
RBC # BLD: 3.78 M/UL — LOW (ref 4.2–5.8)
RBC # FLD: 14.7 % — HIGH (ref 10.3–14.5)
RBC # FLD: 14.7 % — HIGH (ref 10.3–14.5)
RH IG SCN BLD-IMP: POSITIVE — SIGNIFICANT CHANGE UP
SAO2 % BLDA: 99.2 % — HIGH (ref 95–99)
SAO2 % BLDA: 99.3 % — HIGH (ref 95–99)
SAO2 % BLDA: 99.4 % — HIGH (ref 95–99)
SAO2 % BLDA: 99.4 % — HIGH (ref 95–99)
SODIUM BLDA-SCNC: 137 MMOL/L — SIGNIFICANT CHANGE UP (ref 136–146)
SODIUM BLDA-SCNC: 138 MMOL/L — SIGNIFICANT CHANGE UP (ref 136–146)
SODIUM BLDA-SCNC: 139 MMOL/L — SIGNIFICANT CHANGE UP (ref 136–146)
SODIUM BLDA-SCNC: 139 MMOL/L — SIGNIFICANT CHANGE UP (ref 136–146)
SODIUM SERPL-SCNC: 142 MMOL/L — SIGNIFICANT CHANGE UP (ref 135–145)
WBC # BLD: 12.83 K/UL — HIGH (ref 3.8–10.5)
WBC # BLD: 17.89 K/UL — HIGH (ref 3.8–10.5)
WBC # FLD AUTO: 12.83 K/UL — HIGH (ref 3.8–10.5)
WBC # FLD AUTO: 17.89 K/UL — HIGH (ref 3.8–10.5)

## 2019-02-22 PROCEDURE — 44120 REMOVAL OF SMALL INTESTINE: CPT | Mod: 59

## 2019-02-22 PROCEDURE — 44160 REMOVAL OF COLON: CPT

## 2019-02-22 PROCEDURE — 38102 REMOVAL OF SPLEEN TOTAL: CPT

## 2019-02-22 PROCEDURE — 44160 REMOVAL OF COLON: CPT | Mod: 80

## 2019-02-22 PROCEDURE — 38102 REMOVAL OF SPLEEN TOTAL: CPT | Mod: 80

## 2019-02-22 PROCEDURE — 49203: CPT | Mod: 59

## 2019-02-22 PROCEDURE — 88309 TISSUE EXAM BY PATHOLOGIST: CPT | Mod: 26

## 2019-02-22 PROCEDURE — 88342 IMHCHEM/IMCYTCHM 1ST ANTB: CPT | Mod: 26

## 2019-02-22 PROCEDURE — 44120 REMOVAL OF SMALL INTESTINE: CPT | Mod: 80,59

## 2019-02-22 PROCEDURE — 49203: CPT | Mod: 80,59

## 2019-02-22 PROCEDURE — 88341 IMHCHEM/IMCYTCHM EA ADD ANTB: CPT | Mod: 26

## 2019-02-22 PROCEDURE — 88305 TISSUE EXAM BY PATHOLOGIST: CPT | Mod: 26

## 2019-02-22 RX ORDER — MITOMYCIN 5 MG/10ML
30 INJECTION, POWDER, LYOPHILIZED, FOR SOLUTION INTRAVENOUS ONCE
Qty: 0 | Refills: 0 | Status: DISCONTINUED | OUTPATIENT
Start: 2019-02-22 | End: 2019-02-22

## 2019-02-22 RX ORDER — ONDANSETRON 8 MG/1
4 TABLET, FILM COATED ORAL ONCE
Qty: 0 | Refills: 0 | Status: DISCONTINUED | OUTPATIENT
Start: 2019-02-22 | End: 2019-02-22

## 2019-02-22 RX ORDER — MITOMYCIN 5 MG/10ML
10 INJECTION, POWDER, LYOPHILIZED, FOR SOLUTION INTRAVENOUS ONCE
Qty: 0 | Refills: 0 | Status: DISCONTINUED | OUTPATIENT
Start: 2019-02-22 | End: 2019-02-22

## 2019-02-22 RX ORDER — LEVOTHYROXINE SODIUM 125 MCG
55 TABLET ORAL AT BEDTIME
Qty: 0 | Refills: 0 | Status: DISCONTINUED | OUTPATIENT
Start: 2019-02-22 | End: 2019-02-25

## 2019-02-22 RX ORDER — DEXTROSE 50 % IN WATER 50 %
12.5 SYRINGE (ML) INTRAVENOUS ONCE
Qty: 0 | Refills: 0 | Status: DISCONTINUED | OUTPATIENT
Start: 2019-02-22 | End: 2019-03-06

## 2019-02-22 RX ORDER — ONDANSETRON 8 MG/1
4 TABLET, FILM COATED ORAL EVERY 6 HOURS
Qty: 0 | Refills: 0 | Status: DISCONTINUED | OUTPATIENT
Start: 2019-02-22 | End: 2019-03-04

## 2019-02-22 RX ORDER — HYDROMORPHONE HYDROCHLORIDE 2 MG/ML
0.5 INJECTION INTRAMUSCULAR; INTRAVENOUS; SUBCUTANEOUS
Qty: 0 | Refills: 0 | Status: DISCONTINUED | OUTPATIENT
Start: 2019-02-22 | End: 2019-02-22

## 2019-02-22 RX ORDER — DIPHENHYDRAMINE HCL 50 MG
25 CAPSULE ORAL EVERY 4 HOURS
Qty: 0 | Refills: 0 | Status: DISCONTINUED | OUTPATIENT
Start: 2019-02-22 | End: 2019-03-04

## 2019-02-22 RX ORDER — HEPARIN SODIUM 5000 [USP'U]/ML
5000 INJECTION INTRAVENOUS; SUBCUTANEOUS EVERY 12 HOURS
Qty: 0 | Refills: 0 | Status: DISCONTINUED | OUTPATIENT
Start: 2019-02-22 | End: 2019-02-26

## 2019-02-22 RX ORDER — CEFOTETAN DISODIUM 1 G
2 VIAL (EA) INJECTION EVERY 12 HOURS
Qty: 0 | Refills: 0 | Status: COMPLETED | OUTPATIENT
Start: 2019-02-22 | End: 2019-02-23

## 2019-02-22 RX ORDER — SODIUM CHLORIDE 9 MG/ML
1000 INJECTION, SOLUTION INTRAVENOUS
Qty: 0 | Refills: 0 | Status: DISCONTINUED | OUTPATIENT
Start: 2019-02-22 | End: 2019-02-24

## 2019-02-22 RX ORDER — METOPROLOL TARTRATE 50 MG
5 TABLET ORAL ONCE
Qty: 0 | Refills: 0 | Status: COMPLETED | OUTPATIENT
Start: 2019-02-22 | End: 2019-02-22

## 2019-02-22 RX ORDER — NALOXONE HYDROCHLORIDE 4 MG/.1ML
0.1 SPRAY NASAL
Qty: 0 | Refills: 0 | Status: DISCONTINUED | OUTPATIENT
Start: 2019-02-22 | End: 2019-03-04

## 2019-02-22 RX ORDER — INSULIN LISPRO 100/ML
VIAL (ML) SUBCUTANEOUS EVERY 6 HOURS
Qty: 0 | Refills: 0 | Status: DISCONTINUED | OUTPATIENT
Start: 2019-02-22 | End: 2019-03-04

## 2019-02-22 RX ADMIN — SODIUM CHLORIDE 3 MILLILITER(S): 9 INJECTION INTRAMUSCULAR; INTRAVENOUS; SUBCUTANEOUS at 22:22

## 2019-02-22 RX ADMIN — SODIUM CHLORIDE 125 MILLILITER(S): 9 INJECTION, SOLUTION INTRAVENOUS at 13:51

## 2019-02-22 RX ADMIN — HEPARIN SODIUM 5000 UNIT(S): 5000 INJECTION INTRAVENOUS; SUBCUTANEOUS at 22:22

## 2019-02-22 RX ADMIN — SODIUM CHLORIDE 3 MILLILITER(S): 9 INJECTION INTRAMUSCULAR; INTRAVENOUS; SUBCUTANEOUS at 13:46

## 2019-02-22 RX ADMIN — HYDROMORPHONE HYDROCHLORIDE 0.5 MILLIGRAM(S): 2 INJECTION INTRAMUSCULAR; INTRAVENOUS; SUBCUTANEOUS at 14:55

## 2019-02-22 RX ADMIN — Medication 5 MILLIGRAM(S): at 17:18

## 2019-02-22 RX ADMIN — Medication 100 GRAM(S): at 21:02

## 2019-02-22 RX ADMIN — SODIUM CHLORIDE 30 MILLILITER(S): 9 INJECTION, SOLUTION INTRAVENOUS at 13:40

## 2019-02-22 RX ADMIN — HYDROMORPHONE HYDROCHLORIDE 0.5 MILLIGRAM(S): 2 INJECTION INTRAMUSCULAR; INTRAVENOUS; SUBCUTANEOUS at 15:05

## 2019-02-22 NOTE — DISCHARGE NOTE ADULT - CARE PROVIDER_API CALL
Rik Friend)  Surgery  [address] [facility], Division [place] [address]  Phone: [phone]  Fax: [phone]  Follow Up Time:

## 2019-02-22 NOTE — DISCHARGE NOTE ADULT - MEDICATION SUMMARY - MEDICATIONS TO STOP TAKING
I will STOP taking the medications listed below when I get home from the hospital:    losartan 100 mg oral tablet  -- 1 tab(s) by mouth once a day, pm

## 2019-02-22 NOTE — DISCHARGE NOTE ADULT - PATIENT PORTAL LINK FT
You can access the [facility] Patient Portal, offered by [facility], by registering with the following website: [email or web address]

## 2019-02-22 NOTE — CHART NOTE - NSCHARTNOTEFT_GEN_A_CORE
POST-OPERATIVE NOTE    Subjective:  Patient is s/p . Recovering appropriately.     Vital Signs Last 24 Hrs  T(C): 36.5 (23 Feb 2019 00:18), Max: 37.1 (22 Feb 2019 21:35)  T(F): 97.7 (23 Feb 2019 00:18), Max: 98.7 (22 Feb 2019 21:35)  HR: 90 (23 Feb 2019 00:18) (81 - 106)  BP: 124/63 (23 Feb 2019 00:18) (118/68 - 142/83)  BP(mean): 82 (22 Feb 2019 20:30) (74 - 108)  RR: 18 (23 Feb 2019 00:18) (13 - 26)  SpO2: 99% (23 Feb 2019 00:18) (97% - 100%)  I&O's Detail    22 Feb 2019 07:01  -  23 Feb 2019 01:31  --------------------------------------------------------  IN:    lactated ringers.: 50 mL    lactated ringers.: 750 mL  Total IN: 800 mL    OUT:    Bulb: 170 mL    Indwelling Catheter - Urethral: 735 mL    Nasoenteral Tube: 90 mL  Total OUT: 995 mL    Total NET: -195 mL        cefoTEtan  IVPB 2  cefoTEtan  IVPB 2  heparin  Injectable 5000    PAST MEDICAL & SURGICAL HISTORY:  Tuberculosis exposure: 1996 - treated with medicaiton 10 months  Thyroid nodule: benign  Gallstones  Gastritis  Hyperlipidemia  Hypertension  Diabetes mellitus  H/O thyroidectomy  S/P right knee arthroscopy: 1996        Physical Exam:  General: NAD, resting comfortably in bed  Pulmonary: Nonlabored breathing, no respiratory distress  Cardiovascular: NSR  Abdominal: soft, NT/ND  Extremities: WWP      LABS:                        9.5    17.89 )-----------( 339      ( 22 Feb 2019 18:53 )             29.7     02-22    142  |  101  |  15  ----------------------------<  129<H>  3.9   |  23  |  0.60    Ca    8.4      22 Feb 2019 14:10  Phos  3.5     02-22  Mg     1.9     02-22      PT/INR - ( 22 Feb 2019 14:10 )   PT: 17.1 SEC;   INR: 1.52          PTT - ( 22 Feb 2019 14:10 )  PTT:29.1 SEC  CAPILLARY BLOOD GLUCOSE      POCT Blood Glucose.: 157 mg/dL (22 Feb 2019 23:47)  POCT Blood Glucose.: 186 mg/dL (22 Feb 2019 22:14)  POCT Blood Glucose.: 103 mg/dL (22 Feb 2019 18:39)  POCT Blood Glucose.: 72 mg/dL (22 Feb 2019 06:40)      Radiology and Additional Studies:    Assessment:  The patient is a 58y Male who is now several hours post-op from a     Plan:  - Pain control as needed  - DVT ppx  - OOB and ambulating as tolerated  - F/u AM labs POST-OPERATIVE NOTE    Subjective:  Patient is s/p R hemicolectomy, omentectomy and splenectomy. Recovering appropriately. H&H stable in PACU. HDS. Pain well controlled. Denies CP/SOB.     Vital Signs Last 24 Hrs  T(C): 36.5 (23 Feb 2019 00:18), Max: 37.1 (22 Feb 2019 21:35)  T(F): 97.7 (23 Feb 2019 00:18), Max: 98.7 (22 Feb 2019 21:35)  HR: 90 (23 Feb 2019 00:18) (81 - 106)  BP: 124/63 (23 Feb 2019 00:18) (118/68 - 142/83)  BP(mean): 82 (22 Feb 2019 20:30) (74 - 108)  RR: 18 (23 Feb 2019 00:18) (13 - 26)  SpO2: 99% (23 Feb 2019 00:18) (97% - 100%)  I&O's Detail    22 Feb 2019 07:01  -  23 Feb 2019 01:31  --------------------------------------------------------  IN:    lactated ringers.: 50 mL    lactated ringers.: 750 mL  Total IN: 800 mL    OUT:    Bulb: 170 mL    Indwelling Catheter - Urethral: 735 mL    Nasoenteral Tube: 90 mL  Total OUT: 995 mL    Total NET: -195 mL        cefoTEtan  IVPB 2  cefoTEtan  IVPB 2  heparin  Injectable 5000    PAST MEDICAL & SURGICAL HISTORY:  Tuberculosis exposure: 1996 - treated with medicaiton 10 months  Thyroid nodule: benign  Gallstones  Gastritis  Hyperlipidemia  Hypertension  Diabetes mellitus  H/O thyroidectomy  S/P right knee arthroscopy: 1996        Physical Exam:  General: NAD, resting comfortably in bed  Pulmonary: Nonlabored breathing, no respiratory distress  Cardiovascular: NSR  Abdominal: soft, appropriately tender, surgical dressing CDI, ostomy pink w/o output  Extremities: WWP      LABS:                        9.5    17.89 )-----------( 339      ( 22 Feb 2019 18:53 )             29.7     02-22    142  |  101  |  15  ----------------------------<  129<H>  3.9   |  23  |  0.60    Ca    8.4      22 Feb 2019 14:10  Phos  3.5     02-22  Mg     1.9     02-22      PT/INR - ( 22 Feb 2019 14:10 )   PT: 17.1 SEC;   INR: 1.52          PTT - ( 22 Feb 2019 14:10 )  PTT:29.1 SEC  CAPILLARY BLOOD GLUCOSE      POCT Blood Glucose.: 157 mg/dL (22 Feb 2019 23:47)  POCT Blood Glucose.: 186 mg/dL (22 Feb 2019 22:14)  POCT Blood Glucose.: 103 mg/dL (22 Feb 2019 18:39)  POCT Blood Glucose.: 72 mg/dL (22 Feb 2019 06:40)      Radiology and Additional Studies:    Assessment:  The patient is a 58y Male who is now several hours post-op from a s/p R hemicolectomy, omentectomy and splenectomy.    Plan:  - Pain control as needed  - DVT ppx  - OOB and ambulating as tolerated  - F/u AM labs

## 2019-02-22 NOTE — DISCHARGE NOTE ADULT - MEDICATION SUMMARY - MEDICATIONS TO TAKE
I will START or STAY ON the medications listed below when I get home from the hospital:    oxyCODONE 5 mg oral tablet  -- 1 tab(s) by mouth every 6 hours, As Needed -- for severe pain MDD:4   -- Indication: For pain medication    acetaminophen 325 mg oral tablet  -- 2 tab(s) by mouth every 6 hours, As needed, Mild Pain (1 - 3)  -- Indication: For pain medication    metFORMIN 750 mg oral tablet, extended release  -- 1 tab(s) by mouth once a day, pm  -- Indication: For Diabetes mellitus    Imodium A-D 2 mg oral tablet  -- 1 tab(s) by mouth 2 times a day MDD:2 tab  -- It is very important that you take or use this exactly as directed.  Do not skip doses or discontinue unless directed by your doctor.  May cause drowsiness.  Alcohol may intensify this effect.  Use care when operating dangerous machinery.  Obtain medical advice before taking any non-prescription drugs as some may affect the action of this medication.    -- Indication: For for high ostomy output, please do not take before talking with Dr Qureshi     atorvastatin 20 mg oral tablet  -- 1 tab(s) by mouth once a , pm  -- Indication: For Hyperlipidemia    psyllium 3.4 g/7 g oral powder for reconstitution  -- 1 packet(s) by mouth once a day  -- Indication: For osotmy output    sucralfate 1 g oral tablet  -- 2 tab(s) by mouth 2 times a day  -- Indication: For Gastritis    pantoprazole 40 mg oral delayed release tablet  -- 1 tab(s) by mouth once a day (before a meal) MDD:1 tab  -- Indication: For Gastritis    levothyroxine 112 mcg (0.112 mg) oral tablet  -- 1 tab(s) by mouth once a day, an  -- Indication: For H/O thyroidectomy I will START or STAY ON the medications listed below when I get home from the hospital:    acetaminophen 325 mg oral tablet  -- 2 tab(s) by mouth every 6 hours, As needed, Mild Pain (1 - 3)  -- Indication: For pain medication    oxyCODONE 5 mg oral tablet  -- 1 tab(s) by mouth every 6 hours, As Needed -- for severe pain MDD:4   -- Indication: For pain medication    enoxaparin 40 mg/0.4 mL injectable solution  -- 40 milligram(s) subcutaneously once a day MDD:40 mg  -- It is very important that you take or use this exactly as directed.  Do not skip doses or discontinue unless directed by your doctor.    -- Indication: For DVT ppx    metFORMIN 750 mg oral tablet, extended release  -- 1 tab(s) by mouth once a day, pm  -- Indication: For Diabetes mellitus    Imodium A-D 2 mg oral tablet  -- 1 tab(s) by mouth 2 times a day MDD:2 tab  -- It is very important that you take or use this exactly as directed.  Do not skip doses or discontinue unless directed by your doctor.  May cause drowsiness.  Alcohol may intensify this effect.  Use care when operating dangerous machinery.  Obtain medical advice before taking any non-prescription drugs as some may affect the action of this medication.    -- Indication: For for high ostomy output, please do not take before talking with Dr Qureshi     atorvastatin 20 mg oral tablet  -- 1 tab(s) by mouth once a , pm  -- Indication: For Hyperlipidemia    psyllium 3.4 g/7 g oral powder for reconstitution  -- 1 packet(s) by mouth once a day  -- Indication: For osotmy output    sucralfate 1 g oral tablet  -- 2 tab(s) by mouth 2 times a day  -- Indication: For Gastritis    pantoprazole 40 mg oral delayed release tablet  -- 1 tab(s) by mouth once a day (before a meal) MDD:1 tab  -- Indication: For Gastritis    levothyroxine 112 mcg (0.112 mg) oral tablet  -- 1 tab(s) by mouth once a day, an  -- Indication: For H/O thyroidectomy

## 2019-02-22 NOTE — DISCHARGE NOTE ADULT - HOME CARE AGENCY
[facility] at [place] [phone] initial visit will be day after discharge home. A nurse will call prior to the home visit.

## 2019-02-22 NOTE — DISCHARGE NOTE ADULT - CARE PROVIDERS DIRECT ADDRESSES
[email or web address]

## 2019-02-22 NOTE — DISCHARGE NOTE ADULT - INSTRUCTIONS
If you are having feelings of chest pain or shortness of breath, call 911 immediately. Follow up with all doctor appointments as explained above. Do not do any heavy lifting. Continue to keep site clean and dry.

## 2019-02-22 NOTE — BRIEF OPERATIVE NOTE - OPERATION/FINDINGS
Procedure: Exploratory laparotomy, omentectomy, right hemicolectomy, splenectomy, debulking of pseudomyxoma peritonei, creation of diverting ileostomy

## 2019-02-22 NOTE — DISCHARGE NOTE ADULT - PLAN OF CARE
s/p splenectomy 1. Please follow up with Dr. Qureshi or ID (primary please clarify f/u) for vaccines two weeks after discharge. s/p please monitor your glucose levels and follow up with your PMD in 1 week while in the hospital your blood pressure medication was held, please follow up with your  PMD within 1 week to discuss when to restart while in the hosp WOUND CARE:  Please keep incisions clean and dry. Please do not Scrub or rub incisions. Do not use lotion or powder on incisions. Ostomy care as taught in the hospital  Please measure your ostomy output daily. If your output is greater than 1.2 liters per day please call doctor Titus's office to make him aware of increased output   BATHING: You may shower and/or sponge bathe. You may use warm soapy water in the shower and rinse, pat dry.  ACTIVITY: No heavy lifting or straining. Otherwise, you may return to your usual level of physical activity. If you are taking narcotic pain medication DO NOT drive a car, operate machinery or make important decisions.  DIET: low fiber diet  NOTIFY YOUR SURGEON IF YOU HAVE: any bleeding that does not stop, any pus draining from your wound(s), any fever (over 100.4 F) persistent nausea/vomiting, or if your pain is not controlled on your discharge pain medications, unable to urinate.  Please follow up with your primary care physician in one week regarding your hospitalization, bring copies of your discharge paperwork.  Please follow up with your surgeon, Dr. Qureshi as an outpatient, please call to schedule appointment   You are being sent home on metamucil for your ostomy output, please take daily  Please notify Dr Qureshi if your ostomy output is greater than 1.2 liters daily  You are being sent home with a prescription for imodium, please do not take unless informed by Dr Qureshi to take for high ostomy output.   Please follow up with Dr Qureshi as an outpatient regarding your post splenectomy vaccinations while in the hospital you were started on protonix, please continue and follow up with your PMD in 1 week WOUND CARE:  Please keep incisions clean and dry. Please do not Scrub or rub incisions. Do not use lotion or powder on incisions. Ostomy care as taught in the hospital  Please measure your ostomy output daily. If your output is greater than 1.2 liters per day please call doctor Titus's office to make him aware of increased output   BATHING: You may shower and/or sponge bathe. You may use warm soapy water in the shower and rinse, pat dry.  ACTIVITY: No heavy lifting or straining. Otherwise, you may return to your usual level of physical activity. If you are taking narcotic pain medication DO NOT drive a car, operate machinery or make important decisions.  DIET: low fiber/diabetic diet  NOTIFY YOUR SURGEON IF YOU HAVE: any bleeding that does not stop, any pus draining from your wound(s), any fever (over 100.4 F) persistent nausea/vomiting, or if your pain is not controlled on your discharge pain medications, unable to urinate.  Please follow up with your primary care physician in one week regarding your hospitalization, bring copies of your discharge paperwork.  Please follow up with your surgeon, Dr. Qureshi as an outpatient, please call to schedule appointment   You are being sent home on metamucil for your ostomy output, please take daily  Please notify Dr Qureshi if your ostomy output is greater than 1.2 liters daily  You are being sent home with a prescription for imodium, please do not take unless informed by Dr Qureshi to take for high ostomy output.   Please follow up with Dr Qureshi as an outpatient regarding your post splenectomy vaccinations WOUND CARE:  Please keep incisions clean and dry. Please do not Scrub or rub incisions. Do not use lotion or powder on incisions. Ostomy care as taught in the hospital. VNS set up to assist with ostomy care  Please measure your ostomy output daily. If your output is greater than 1.2 liters per day please call doctor Qureshi's office to make him aware of increased output   BATHING: You may shower and/or sponge bathe. You may use warm soapy water in the shower and rinse, pat dry.  ACTIVITY: No heavy lifting or straining. Otherwise, you may return to your usual level of physical activity. If you are taking narcotic pain medication DO NOT drive a car, operate machinery or make important decisions.  DIET: low fiber/diabetic diet  NOTIFY YOUR SURGEON IF YOU HAVE: any bleeding that does not stop, any pus draining from your wound(s), any fever (over 100.4 F) persistent nausea/vomiting, or if your pain is not controlled on your discharge pain medications, unable to urinate.  Please follow up with your primary care physician in one week regarding your hospitalization, bring copies of your discharge paperwork.  Please follow up with your surgeon, Dr. Qureshi as an outpatient, please call to schedule appointment   You are being sent home on metamucil for your ostomy output, please take daily  Please notify Dr Qureshi if your ostomy output is greater than 1.2 liters daily  You are being sent home with a prescription for imodium, please do not take unless informed by Dr Qureshi to take for high ostomy output.   Please follow up with Dr Qureshi as an outpatient regarding your post splenectomy vaccinations  You are being discharged home with a new medication called Lovenox, teaching provided while in hospital, you also have VNS set up to assist with administration of medication, and you will follow up in Dr Qureshi's office 3 days a week for administration WOUND CARE:  Please keep incisions clean and dry. Please do not Scrub or rub incisions. Do not use lotion or powder on incisions. Ostomy care as taught in the hospital. VNS set up to assist with ostomy care  Please measure your ostomy output daily. If your output is greater than 1.2 liters per day please call Dr Qureshi's office to make him aware of increased output   BATHING: You may shower and/or sponge bathe. You may use warm soapy water in the shower and rinse, pat dry.  ACTIVITY: No heavy lifting or straining. Otherwise, you may return to your usual level of physical activity. If you are taking narcotic pain medication DO NOT drive a car, operate machinery or make important decisions.  DIET: low fiber/diabetic diet  NOTIFY YOUR SURGEON IF YOU HAVE: any bleeding that does not stop, any pus draining from your wound(s), any fever (over 100.4 F) persistent nausea/vomiting, or if your pain is not controlled on your discharge pain medications, unable to urinate.  Please follow up with your primary care physician in one week regarding your hospitalization, bring copies of your discharge paperwork.  Please follow up with your surgeon, Dr. Qureshi as an outpatient, please call to schedule appointment   You are being sent home on a medication called metamucil for your ostomy output, please take daily  Please notify Dr Qureshi if your ostomy output is greater than 1.2 liters daily  You are being sent home with a prescription for imodium, please do not take unless informed by Dr Qureshi to take for high ostomy output.   Please follow up with Dr Qureshi as an outpatient regarding your post splenectomy vaccinations  You are being discharged home with a new medication called Lovenox, teaching provided while in hospital, you also have VNS set up to assist with administration of medication, and you will follow up in Dr Qureshi's office 3 days a week for administration

## 2019-02-22 NOTE — DISCHARGE NOTE ADULT - HOSPITAL COURSE
58 year old male with hx of gallstones with c/o right abdominal pain, had u/s which found fluid in abdomin. 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 Pt tolerated procedure well. 58 year old male with hx of gallstones with c/o right abdominal pain, had u/s which found fluid in abdomin. 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 Pt tolerated procedure well.    2/25 pt underwent clamp trial, with low residual, NGT removed    2/26 pt +ostomy function so started on CLD. Pt with episode emesis O/N so NGT replaced 58 year old male with hx of gallstones with c/o right abdominal pain, had u/s which found fluid in abdomin. 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 Pt tolerated procedure well.    2/25 pt underwent clamp trial, with low residual, NGT removed    2/26 pt advanced to CLD but had episode emesis O/N so NGT replaced    When return +ostomy 58 year old male with hx of gallstones with c/o right abdominal pain, had u/s which found fluid in abdomin. 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 Pt tolerated procedure well.    2/25 pt underwent clamp trial, with low residual, NGT removed    2/26 pt advanced to CLD but had episode emesis O/N so NGT replaced  AXR performed and showed Air-filled dilated loops of small bowel seen within left hemiabdomen  which may represent ileus versus partial obstruction. If symptoms persist cross-sectional imaging can be obtained for further evaluation. Postsurgical changes as described above. Question pneumoperitoneum in the  left upper abdomen, likely related to patient's postoperative state.    3/2 CT scan performed and showed moderate decrease in soft tissue implants  throughout the abdomen and pelvis with small residual ascites and trace  pneumoperitoneum, consistent with recent postoperative state.  No bowel obstruction. Mildly thickened urinary bladder wall versus underdistention. Correlate  with urinalysis to exclude cystitis.    3/3 NGT removed and advanced to clears diet. Diet slowly advanced as tolerated    Pt with heath drain with decreased output, removed prior to discharge    Per Attending pt to be discharge home on a 28 day course Lovenox, teaching provided while in hospital but patient states not willing to administer injections at home and dose not have anyone else at home willing to learn how to administer. Spoke with , pt able to receive VNS to assistance with injections but would not be daily, and pt continues to refuse to learn how to do on days no VNS service available.  offered pt to have private nursing to supplement but patient does not want pay for private service    Per Attending pt stable for discharge home. Pt tolerating diet, + ostomy function, and pain well controlled. Ostomy teaching provided while in hospital. Pt to follow up with Dr Qureshi as an outpatient, instructed to call to schedule appointment. Pt to be discharged home on daily metamucil. Pt to measure daily ostomy outputs, if >1.2L instructed to call to Dr Qureshi's office. 58 year old male with hx of gallstones with c/o right abdominal pain, had u/s which found fluid in abdomin. 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 Pt tolerated procedure well.    2/25 pt underwent clamp trial, with low residual, NGT removed    2/26 pt advanced to CLD but had episode emesis O/N so NGT replaced  AXR performed and showed Air-filled dilated loops of small bowel seen within left hemiabdomen  which may represent ileus versus partial obstruction. If symptoms persist cross-sectional imaging can be obtained for further evaluation. Postsurgical changes as described above. Question pneumoperitoneum in the  left upper abdomen, likely related to patient's postoperative state.    3/2 CT scan performed and showed moderate decrease in soft tissue implants  throughout the abdomen and pelvis with small residual ascites and trace  pneumoperitoneum, consistent with recent postoperative state.  No bowel obstruction. Mildly thickened urinary bladder wall versus underdistention. Correlate  with urinalysis to exclude cystitis.    3/3 NGT removed and advanced to clears diet. Diet slowly advanced as tolerated    Pt with heath drain with decreased output, removed prior to discharge    Per Attending pt to be discharge home on a 28 day course Lovenox, teaching provided while in hospital. Pt with friend who will to assist with administration, of medication. Pt also set up to have administration of medication in Dr Qureshi's clinic 3 days a week/ VNS set up for ostomy care as outpatient and will also assist with dosing of Lovenonx     Per Attending pt stable for discharge home. Pt tolerating diet, + ostomy function, and pain well controlled. Ostomy teaching provided while in hospital. Pt to follow up with Dr Qureshi as an outpatient, instructed to call to schedule appointment. Pt to be discharged home on daily metamucil. Pt to measure daily ostomy outputs, if >1.2L instructed to call to Dr Qureshi's office. Pt being discharged with prescription for imodium but informed not to take unless instructed by Dr Qureshi for high ostomy output 58 year old male with hx of gallstones with c/o right abdominal pain, had u/s which found fluid in abdomin. 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 Pt tolerated procedure well.    2/25 pt underwent clamp trial, with low residual, NGT removed    2/26 pt advanced to CLD but had episode emesis O/N so NGT replaced  AXR performed and showed Air-filled dilated loops of small bowel seen within left hemiabdomen  which may represent ileus versus partial obstruction. If symptoms persist cross-sectional imaging can be obtained for further evaluation. Postsurgical changes as described above. Question pneumoperitoneum in the  left upper abdomen, likely related to patient's postoperative state.    3/2 CT scan performed and showed moderate decrease in soft tissue implants  throughout the abdomen and pelvis with small residual ascites and trace  pneumoperitoneum, consistent with recent postoperative state.  No bowel obstruction. Mildly thickened urinary bladder wall versus underdistention. Correlate  with urinalysis to exclude cystitis.    3/3 NGT removed and advanced to clears diet. Diet slowly advanced as tolerated    Pt with heath drain with decreased output, removed prior to discharge    Per Attending pt to be discharge home on a 28 day course Lovenox, teaching provided while in hospital. Pt with friend who will to assist with administration, of medication. Pt also set up to have administration of medication in Dr Qureshi's clinic 3 days a week/ VNS set up for ostomy care as outpatient and will also assist with dosing of Lovenonx     Per Attending pt stable for discharge home. Pt tolerating diet, + ostomy function, and pain well controlled. Ostomy teaching provided while in hospital. Pt to follow up with Dr Qureshi as an outpatient, instructed to call to schedule appointment. Pt to be discharged home on daily metamucil. Pt to measure daily ostomy outputs, if >1.2L instructed to call to Dr Qureshi's office. Pt being discharged with prescription for imodium but informed not to take unless instructed by Dr Qureshi for high ostomy output    ATTENDING STATEMENT:  - I have seen and examined the patient on rounds. Patient's chart, labs, images and reports reviewed.  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 Patient educated with DC instructions, warning signs and symptoms . Pt also instructed to follow up with the surgeon in 1 week in office. Pt expressed verbal understanding.  -Patient instructed to follow up with me in my office 7-10 days after discharge. Follow up instructions provided.  -Patient instructed not to perform heavy activity and/or  lifting >10lbs for 8 weeks. Patient expressed verbal understanding.  Regards  Rik Qureshi MD  Division of Surgical Oncology  [address] [place] [id number]  Ph:[phone]

## 2019-02-22 NOTE — BRIEF OPERATIVE NOTE - PRE-OP DX
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

## 2019-02-22 NOTE — DISCHARGE NOTE ADULT - CARE PLAN
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 Please follow up with Dr. Qureshi or ID (primary please clarify f/u) for vaccines two weeks after discharge. Principal Discharge DX:	Spleen neoplasm  Goal:	s/p  Secondary Diagnosis:	Diabetes mellitus  Assessment and plan of treatment:	please monitor your glucose levels and follow up with your PMD in 1 week  Secondary Diagnosis:	Hypertension  Assessment and plan of treatment:	while in the hospital your blood pressure medication was held, please follow up with your  PMD within 1 week to discuss when to restart  Secondary Diagnosis:	Gastritis  Assessment and plan of treatment:	while in the hosp Principal Discharge DX:	Spleen neoplasm  Goal:	s/p  Assessment and plan of treatment:	WOUND CARE:  Please keep incisions clean and dry. Please do not Scrub or rub incisions. Do not use lotion or powder on incisions. Ostomy care as taught in the hospital  Please measure your ostomy output daily. If your output is greater than 1.2 liters per day please call doctor Titus's office to make him aware of increased output   BATHING: You may shower and/or sponge bathe. You may use warm soapy water in the shower and rinse, pat dry.  ACTIVITY: No heavy lifting or straining. Otherwise, you may return to your usual level of physical activity. If you are taking narcotic pain medication DO NOT drive a car, operate machinery or make important decisions.  DIET: low fiber diet  NOTIFY YOUR SURGEON IF YOU HAVE: any bleeding that does not stop, any pus draining from your wound(s), any fever (over 100.4 F) persistent nausea/vomiting, or if your pain is not controlled on your discharge pain medications, unable to urinate.  Please follow up with your primary care physician in one week regarding your hospitalization, bring copies of your discharge paperwork.  Please follow up with your surgeon, Dr. Qureshi as an outpatient, please call to schedule appointment   You are being sent home on metamucil for your ostomy output, please take daily  Please notify Dr Qureshi if your ostomy output is greater than 1.2 liters daily  You are being sent home with a prescription for imodium, please do not take unless informed by Dr Qureshi to take for high ostomy output.   Please follow up with Dr Qureshi as an outpatient regarding your post splenectomy vaccinations  Secondary Diagnosis:	Diabetes mellitus  Assessment and plan of treatment:	please monitor your glucose levels and follow up with your PMD in 1 week  Secondary Diagnosis:	Hypertension  Assessment and plan of treatment:	while in the hospital your blood pressure medication was held, please follow up with your  PMD within 1 week to discuss when to restart  Secondary Diagnosis:	Gastritis  Assessment and plan of treatment:	while in the hospital you were started on protonix, please continue and follow up with your PMD in 1 week Principal Discharge DX:	Spleen neoplasm  Goal:	s/p  Assessment and plan of treatment:	WOUND CARE:  Please keep incisions clean and dry. Please do not Scrub or rub incisions. Do not use lotion or powder on incisions. Ostomy care as taught in the hospital  Please measure your ostomy output daily. If your output is greater than 1.2 liters per day please call doctor Titus's office to make him aware of increased output   BATHING: You may shower and/or sponge bathe. You may use warm soapy water in the shower and rinse, pat dry.  ACTIVITY: No heavy lifting or straining. Otherwise, you may return to your usual level of physical activity. If you are taking narcotic pain medication DO NOT drive a car, operate machinery or make important decisions.  DIET: low fiber/diabetic diet  NOTIFY YOUR SURGEON IF YOU HAVE: any bleeding that does not stop, any pus draining from your wound(s), any fever (over 100.4 F) persistent nausea/vomiting, or if your pain is not controlled on your discharge pain medications, unable to urinate.  Please follow up with your primary care physician in one week regarding your hospitalization, bring copies of your discharge paperwork.  Please follow up with your surgeon, Dr. Qureshi as an outpatient, please call to schedule appointment   You are being sent home on metamucil for your ostomy output, please take daily  Please notify Dr Qureshi if your ostomy output is greater than 1.2 liters daily  You are being sent home with a prescription for imodium, please do not take unless informed by Dr Qureshi to take for high ostomy output.   Please follow up with Dr Qureshi as an outpatient regarding your post splenectomy vaccinations  Secondary Diagnosis:	Diabetes mellitus  Assessment and plan of treatment:	please monitor your glucose levels and follow up with your PMD in 1 week  Secondary Diagnosis:	Hypertension  Assessment and plan of treatment:	while in the hospital your blood pressure medication was held, please follow up with your  PMD within 1 week to discuss when to restart  Secondary Diagnosis:	Gastritis  Assessment and plan of treatment:	while in the hospital you were started on protonix, please continue and follow up with your PMD in 1 week Principal Discharge DX:	Spleen neoplasm  Goal:	s/p  Assessment and plan of treatment:	WOUND CARE:  Please keep incisions clean and dry. Please do not Scrub or rub incisions. Do not use lotion or powder on incisions. Ostomy care as taught in the hospital. VNS set up to assist with ostomy care  Please measure your ostomy output daily. If your output is greater than 1.2 liters per day please call doctor Titus's office to make him aware of increased output   BATHING: You may shower and/or sponge bathe. You may use warm soapy water in the shower and rinse, pat dry.  ACTIVITY: No heavy lifting or straining. Otherwise, you may return to your usual level of physical activity. If you are taking narcotic pain medication DO NOT drive a car, operate machinery or make important decisions.  DIET: low fiber/diabetic diet  NOTIFY YOUR SURGEON IF YOU HAVE: any bleeding that does not stop, any pus draining from your wound(s), any fever (over 100.4 F) persistent nausea/vomiting, or if your pain is not controlled on your discharge pain medications, unable to urinate.  Please follow up with your primary care physician in one week regarding your hospitalization, bring copies of your discharge paperwork.  Please follow up with your surgeon, Dr. Qureshi as an outpatient, please call to schedule appointment   You are being sent home on metamucil for your ostomy output, please take daily  Please notify Dr Qureshi if your ostomy output is greater than 1.2 liters daily  You are being sent home with a prescription for imodium, please do not take unless informed by Dr Qureshi to take for high ostomy output.   Please follow up with Dr Qureshi as an outpatient regarding your post splenectomy vaccinations  You are being discharged home with a new medication called Lovenox, teaching provided while in hospital, you also have VNS set up to assist with administration of medication, and you will follow up in Dr Qureshi's office 3 days a week for administration  Secondary Diagnosis:	Diabetes mellitus  Assessment and plan of treatment:	please monitor your glucose levels and follow up with your PMD in 1 week  Secondary Diagnosis:	Hypertension  Assessment and plan of treatment:	while in the hospital your blood pressure medication was held, please follow up with your  PMD within 1 week to discuss when to restart  Secondary Diagnosis:	Gastritis  Assessment and plan of treatment:	while in the hospital you were started on protonix, please continue and follow up with your PMD in 1 week Principal Discharge DX:	Spleen neoplasm  Goal:	s/p  Assessment and plan of treatment:	WOUND CARE:  Please keep incisions clean and dry. Please do not Scrub or rub incisions. Do not use lotion or powder on incisions. Ostomy care as taught in the hospital. VNS set up to assist with ostomy care  Please measure your ostomy output daily. If your output is greater than 1.2 liters per day please call Dr Qureshi's office to make him aware of increased output   BATHING: You may shower and/or sponge bathe. You may use warm soapy water in the shower and rinse, pat dry.  ACTIVITY: No heavy lifting or straining. Otherwise, you may return to your usual level of physical activity. If you are taking narcotic pain medication DO NOT drive a car, operate machinery or make important decisions.  DIET: low fiber/diabetic diet  NOTIFY YOUR SURGEON IF YOU HAVE: any bleeding that does not stop, any pus draining from your wound(s), any fever (over 100.4 F) persistent nausea/vomiting, or if your pain is not controlled on your discharge pain medications, unable to urinate.  Please follow up with your primary care physician in one week regarding your hospitalization, bring copies of your discharge paperwork.  Please follow up with your surgeon, Dr. Qureshi as an outpatient, please call to schedule appointment   You are being sent home on a medication called metamucil for your ostomy output, please take daily  Please notify Dr Qureshi if your ostomy output is greater than 1.2 liters daily  You are being sent home with a prescription for imodium, please do not take unless informed by Dr Qureshi to take for high ostomy output.   Please follow up with Dr Qureshi as an outpatient regarding your post splenectomy vaccinations  You are being discharged home with a new medication called Lovenox, teaching provided while in hospital, you also have VNS set up to assist with administration of medication, and you will follow up in Dr Qureshi's office 3 days a week for administration  Secondary Diagnosis:	Diabetes mellitus  Assessment and plan of treatment:	please monitor your glucose levels and follow up with your PMD in 1 week  Secondary Diagnosis:	Hypertension  Assessment and plan of treatment:	while in the hospital your blood pressure medication was held, please follow up with your  PMD within 1 week to discuss when to restart  Secondary Diagnosis:	Gastritis  Assessment and plan of treatment:	while in the hospital you were started on protonix, please continue and follow up with your PMD in 1 week

## 2019-02-23 LAB
ANION GAP SERPL CALC-SCNC: 9 MMO/L — SIGNIFICANT CHANGE UP (ref 7–14)
APTT BLD: 21.4 SEC — LOW (ref 27.5–36.3)
APTT BLD: 31 SEC — SIGNIFICANT CHANGE UP (ref 27.5–36.3)
BUN SERPL-MCNC: 9 MG/DL — SIGNIFICANT CHANGE UP (ref 7–23)
CALCIUM SERPL-MCNC: 8.7 MG/DL — SIGNIFICANT CHANGE UP (ref 8.4–10.5)
CHLORIDE SERPL-SCNC: 101 MMOL/L — SIGNIFICANT CHANGE UP (ref 98–107)
CO2 SERPL-SCNC: 28 MMOL/L — SIGNIFICANT CHANGE UP (ref 22–31)
CREAT SERPL-MCNC: 0.63 MG/DL — SIGNIFICANT CHANGE UP (ref 0.5–1.3)
GLUCOSE SERPL-MCNC: 109 MG/DL — HIGH (ref 70–99)
HCT VFR BLD CALC: 31.4 % — LOW (ref 39–50)
HGB BLD-MCNC: 9.9 G/DL — LOW (ref 13–17)
INR BLD: 1.17 — SIGNIFICANT CHANGE UP (ref 0.88–1.17)
INR BLD: 1.39 — HIGH (ref 0.88–1.17)
MAGNESIUM SERPL-MCNC: 1.7 MG/DL — SIGNIFICANT CHANGE UP (ref 1.6–2.6)
MCHC RBC-ENTMCNC: 25.4 PG — LOW (ref 27–34)
MCHC RBC-ENTMCNC: 31.5 % — LOW (ref 32–36)
MCV RBC AUTO: 80.7 FL — SIGNIFICANT CHANGE UP (ref 80–100)
NRBC # FLD: 0 K/UL — LOW (ref 25–125)
PHOSPHATE SERPL-MCNC: 2.7 MG/DL — SIGNIFICANT CHANGE UP (ref 2.5–4.5)
PLATELET # BLD AUTO: 377 K/UL — SIGNIFICANT CHANGE UP (ref 150–400)
PMV BLD: 9.8 FL — SIGNIFICANT CHANGE UP (ref 7–13)
POTASSIUM SERPL-MCNC: 4.1 MMOL/L — SIGNIFICANT CHANGE UP (ref 3.5–5.3)
POTASSIUM SERPL-SCNC: 4.1 MMOL/L — SIGNIFICANT CHANGE UP (ref 3.5–5.3)
PROTHROM AB SERPL-ACNC: 13 SEC — SIGNIFICANT CHANGE UP (ref 9.8–13.1)
PROTHROM AB SERPL-ACNC: 15.6 SEC — HIGH (ref 9.8–13.1)
RBC # BLD: 3.89 M/UL — LOW (ref 4.2–5.8)
RBC # FLD: 14.8 % — HIGH (ref 10.3–14.5)
SODIUM SERPL-SCNC: 138 MMOL/L — SIGNIFICANT CHANGE UP (ref 135–145)
WBC # BLD: 24.25 K/UL — HIGH (ref 3.8–10.5)
WBC # FLD AUTO: 24.25 K/UL — HIGH (ref 3.8–10.5)

## 2019-02-23 RX ORDER — ACETAMINOPHEN 500 MG
1000 TABLET ORAL ONCE
Qty: 0 | Refills: 0 | Status: COMPLETED | OUTPATIENT
Start: 2019-02-23 | End: 2019-03-02

## 2019-02-23 RX ORDER — MAGNESIUM SULFATE 500 MG/ML
2 VIAL (ML) INJECTION ONCE
Qty: 0 | Refills: 0 | Status: COMPLETED | OUTPATIENT
Start: 2019-02-23 | End: 2019-02-23

## 2019-02-23 RX ORDER — ACETAMINOPHEN 500 MG
1000 TABLET ORAL ONCE
Qty: 0 | Refills: 0 | Status: COMPLETED | OUTPATIENT
Start: 2019-02-24 | End: 2019-02-24

## 2019-02-23 RX ORDER — PHYTONADIONE (VIT K1) 5 MG
5 TABLET ORAL ONCE
Qty: 0 | Refills: 0 | Status: COMPLETED | OUTPATIENT
Start: 2019-02-23 | End: 2019-02-23

## 2019-02-23 RX ADMIN — Medication 1: at 00:31

## 2019-02-23 RX ADMIN — Medication 50 GRAM(S): at 11:37

## 2019-02-23 RX ADMIN — Medication 100 GRAM(S): at 10:57

## 2019-02-23 RX ADMIN — HEPARIN SODIUM 5000 UNIT(S): 5000 INJECTION INTRAVENOUS; SUBCUTANEOUS at 22:22

## 2019-02-23 RX ADMIN — SODIUM CHLORIDE 125 MILLILITER(S): 9 INJECTION, SOLUTION INTRAVENOUS at 08:47

## 2019-02-23 RX ADMIN — Medication 55 MICROGRAM(S): at 06:02

## 2019-02-23 RX ADMIN — SODIUM CHLORIDE 3 MILLILITER(S): 9 INJECTION INTRAMUSCULAR; INTRAVENOUS; SUBCUTANEOUS at 06:13

## 2019-02-23 RX ADMIN — Medication 25 MILLIGRAM(S): at 13:06

## 2019-02-23 RX ADMIN — Medication 25 MILLIGRAM(S): at 06:02

## 2019-02-23 RX ADMIN — SODIUM CHLORIDE 3 MILLILITER(S): 9 INJECTION INTRAMUSCULAR; INTRAVENOUS; SUBCUTANEOUS at 22:23

## 2019-02-23 RX ADMIN — Medication 63.75 MILLIMOLE(S): at 11:37

## 2019-02-23 RX ADMIN — SODIUM CHLORIDE 3 MILLILITER(S): 9 INJECTION INTRAMUSCULAR; INTRAVENOUS; SUBCUTANEOUS at 12:29

## 2019-02-23 RX ADMIN — HEPARIN SODIUM 5000 UNIT(S): 5000 INJECTION INTRAVENOUS; SUBCUTANEOUS at 09:11

## 2019-02-23 RX ADMIN — Medication 25 MILLIGRAM(S): at 00:30

## 2019-02-23 RX ADMIN — Medication 101 MILLIGRAM(S): at 09:11

## 2019-02-23 RX ADMIN — SODIUM CHLORIDE 125 MILLILITER(S): 9 INJECTION, SOLUTION INTRAVENOUS at 00:34

## 2019-02-23 RX ADMIN — Medication 55 MICROGRAM(S): at 22:23

## 2019-02-24 LAB
ANION GAP SERPL CALC-SCNC: 13 MMO/L — SIGNIFICANT CHANGE UP (ref 7–14)
APTT BLD: 29.3 SEC — SIGNIFICANT CHANGE UP (ref 27.5–36.3)
BUN SERPL-MCNC: 9 MG/DL — SIGNIFICANT CHANGE UP (ref 7–23)
CALCIUM SERPL-MCNC: 8.1 MG/DL — LOW (ref 8.4–10.5)
CHLORIDE SERPL-SCNC: 100 MMOL/L — SIGNIFICANT CHANGE UP (ref 98–107)
CO2 SERPL-SCNC: 25 MMOL/L — SIGNIFICANT CHANGE UP (ref 22–31)
CREAT SERPL-MCNC: 0.64 MG/DL — SIGNIFICANT CHANGE UP (ref 0.5–1.3)
GLUCOSE SERPL-MCNC: 62 MG/DL — LOW (ref 70–99)
HCT VFR BLD CALC: 29.1 % — LOW (ref 39–50)
HGB BLD-MCNC: 9.2 G/DL — LOW (ref 13–17)
INR BLD: 1.26 — HIGH (ref 0.88–1.17)
MAGNESIUM SERPL-MCNC: 1.6 MG/DL — SIGNIFICANT CHANGE UP (ref 1.6–2.6)
MCHC RBC-ENTMCNC: 25.6 PG — LOW (ref 27–34)
MCHC RBC-ENTMCNC: 31.6 % — LOW (ref 32–36)
MCV RBC AUTO: 81.1 FL — SIGNIFICANT CHANGE UP (ref 80–100)
NRBC # FLD: 0 K/UL — LOW (ref 25–125)
PHOSPHATE SERPL-MCNC: 2.8 MG/DL — SIGNIFICANT CHANGE UP (ref 2.5–4.5)
PLATELET # BLD AUTO: 358 K/UL — SIGNIFICANT CHANGE UP (ref 150–400)
PMV BLD: 10.4 FL — SIGNIFICANT CHANGE UP (ref 7–13)
POTASSIUM SERPL-MCNC: 3.9 MMOL/L — SIGNIFICANT CHANGE UP (ref 3.5–5.3)
POTASSIUM SERPL-SCNC: 3.9 MMOL/L — SIGNIFICANT CHANGE UP (ref 3.5–5.3)
PROTHROM AB SERPL-ACNC: 14.5 SEC — HIGH (ref 9.8–13.1)
RBC # BLD: 3.59 M/UL — LOW (ref 4.2–5.8)
RBC # FLD: 15 % — HIGH (ref 10.3–14.5)
SODIUM SERPL-SCNC: 138 MMOL/L — SIGNIFICANT CHANGE UP (ref 135–145)
WBC # BLD: 19.31 K/UL — HIGH (ref 3.8–10.5)
WBC # FLD AUTO: 19.31 K/UL — HIGH (ref 3.8–10.5)

## 2019-02-24 RX ORDER — POTASSIUM PHOSPHATE, MONOBASIC POTASSIUM PHOSPHATE, DIBASIC 236; 224 MG/ML; MG/ML
15 INJECTION, SOLUTION INTRAVENOUS ONCE
Qty: 0 | Refills: 0 | Status: COMPLETED | OUTPATIENT
Start: 2019-02-24 | End: 2019-02-24

## 2019-02-24 RX ORDER — SODIUM CHLORIDE 9 MG/ML
1000 INJECTION, SOLUTION INTRAVENOUS
Qty: 0 | Refills: 0 | Status: DISCONTINUED | OUTPATIENT
Start: 2019-02-24 | End: 2019-02-26

## 2019-02-24 RX ORDER — LEVOTHYROXINE SODIUM 125 MCG
56 TABLET ORAL AT BEDTIME
Qty: 0 | Refills: 0 | Status: DISCONTINUED | OUTPATIENT
Start: 2019-02-24 | End: 2019-02-25

## 2019-02-24 RX ORDER — MAGNESIUM SULFATE 500 MG/ML
2 VIAL (ML) INJECTION ONCE
Qty: 0 | Refills: 0 | Status: COMPLETED | OUTPATIENT
Start: 2019-02-24 | End: 2019-02-24

## 2019-02-24 RX ADMIN — SODIUM CHLORIDE 125 MILLILITER(S): 9 INJECTION, SOLUTION INTRAVENOUS at 08:45

## 2019-02-24 RX ADMIN — Medication 50 GRAM(S): at 12:45

## 2019-02-24 RX ADMIN — SODIUM CHLORIDE 3 MILLILITER(S): 9 INJECTION INTRAMUSCULAR; INTRAVENOUS; SUBCUTANEOUS at 21:28

## 2019-02-24 RX ADMIN — HEPARIN SODIUM 5000 UNIT(S): 5000 INJECTION INTRAVENOUS; SUBCUTANEOUS at 21:28

## 2019-02-24 RX ADMIN — HEPARIN SODIUM 5000 UNIT(S): 5000 INJECTION INTRAVENOUS; SUBCUTANEOUS at 09:27

## 2019-02-24 RX ADMIN — Medication 400 MILLIGRAM(S): at 09:27

## 2019-02-24 RX ADMIN — Medication 1000 MILLIGRAM(S): at 10:13

## 2019-02-24 RX ADMIN — SODIUM CHLORIDE 3 MILLILITER(S): 9 INJECTION INTRAMUSCULAR; INTRAVENOUS; SUBCUTANEOUS at 05:51

## 2019-02-24 RX ADMIN — POTASSIUM PHOSPHATE, MONOBASIC POTASSIUM PHOSPHATE, DIBASIC 62.5 MILLIMOLE(S): 236; 224 INJECTION, SOLUTION INTRAVENOUS at 12:12

## 2019-02-24 RX ADMIN — SODIUM CHLORIDE 3 MILLILITER(S): 9 INJECTION INTRAMUSCULAR; INTRAVENOUS; SUBCUTANEOUS at 13:18

## 2019-02-24 RX ADMIN — Medication 55 MICROGRAM(S): at 18:35

## 2019-02-25 LAB
ANION GAP SERPL CALC-SCNC: 7 MMO/L — SIGNIFICANT CHANGE UP (ref 7–14)
APTT BLD: 30.1 SEC — SIGNIFICANT CHANGE UP (ref 27.5–36.3)
BUN SERPL-MCNC: 4 MG/DL — LOW (ref 7–23)
CALCIUM SERPL-MCNC: 8.2 MG/DL — LOW (ref 8.4–10.5)
CHLORIDE SERPL-SCNC: 102 MMOL/L — SIGNIFICANT CHANGE UP (ref 98–107)
CO2 SERPL-SCNC: 30 MMOL/L — SIGNIFICANT CHANGE UP (ref 22–31)
CREAT SERPL-MCNC: 0.56 MG/DL — SIGNIFICANT CHANGE UP (ref 0.5–1.3)
GLUCOSE SERPL-MCNC: 140 MG/DL — HIGH (ref 70–99)
HCT VFR BLD CALC: 28.9 % — LOW (ref 39–50)
HGB BLD-MCNC: 9 G/DL — LOW (ref 13–17)
INR BLD: 1.21 — HIGH (ref 0.88–1.17)
MAGNESIUM SERPL-MCNC: 1.7 MG/DL — SIGNIFICANT CHANGE UP (ref 1.6–2.6)
MCHC RBC-ENTMCNC: 25.7 PG — LOW (ref 27–34)
MCHC RBC-ENTMCNC: 31.1 % — LOW (ref 32–36)
MCV RBC AUTO: 82.6 FL — SIGNIFICANT CHANGE UP (ref 80–100)
NRBC # FLD: 0 K/UL — LOW (ref 25–125)
PHOSPHATE SERPL-MCNC: 2.1 MG/DL — LOW (ref 2.5–4.5)
PLATELET # BLD AUTO: 379 K/UL — SIGNIFICANT CHANGE UP (ref 150–400)
PMV BLD: 9.7 FL — SIGNIFICANT CHANGE UP (ref 7–13)
POTASSIUM SERPL-MCNC: 3.6 MMOL/L — SIGNIFICANT CHANGE UP (ref 3.5–5.3)
POTASSIUM SERPL-SCNC: 3.6 MMOL/L — SIGNIFICANT CHANGE UP (ref 3.5–5.3)
PROTHROM AB SERPL-ACNC: 13.5 SEC — HIGH (ref 9.8–13.1)
RBC # BLD: 3.5 M/UL — LOW (ref 4.2–5.8)
RBC # FLD: 14.9 % — HIGH (ref 10.3–14.5)
SODIUM SERPL-SCNC: 139 MMOL/L — SIGNIFICANT CHANGE UP (ref 135–145)
WBC # BLD: 14.08 K/UL — HIGH (ref 3.8–10.5)
WBC # FLD AUTO: 14.08 K/UL — HIGH (ref 3.8–10.5)

## 2019-02-25 RX ORDER — POTASSIUM PHOSPHATE, MONOBASIC POTASSIUM PHOSPHATE, DIBASIC 236; 224 MG/ML; MG/ML
15 INJECTION, SOLUTION INTRAVENOUS ONCE
Qty: 0 | Refills: 0 | Status: COMPLETED | OUTPATIENT
Start: 2019-02-25 | End: 2019-02-25

## 2019-02-25 RX ORDER — LEVOTHYROXINE SODIUM 125 MCG
56 TABLET ORAL
Qty: 0 | Refills: 0 | Status: DISCONTINUED | OUTPATIENT
Start: 2019-02-25 | End: 2019-03-04

## 2019-02-25 RX ORDER — POTASSIUM CHLORIDE 20 MEQ
10 PACKET (EA) ORAL ONCE
Qty: 0 | Refills: 0 | Status: COMPLETED | OUTPATIENT
Start: 2019-02-25 | End: 2019-02-25

## 2019-02-25 RX ORDER — MAGNESIUM SULFATE 500 MG/ML
2 VIAL (ML) INJECTION ONCE
Qty: 0 | Refills: 0 | Status: COMPLETED | OUTPATIENT
Start: 2019-02-25 | End: 2019-02-25

## 2019-02-25 RX ORDER — HYDROMORPHONE HYDROCHLORIDE 2 MG/ML
0.5 INJECTION INTRAMUSCULAR; INTRAVENOUS; SUBCUTANEOUS
Qty: 0 | Refills: 0 | Status: DISCONTINUED | OUTPATIENT
Start: 2019-02-25 | End: 2019-02-26

## 2019-02-25 RX ADMIN — POTASSIUM PHOSPHATE, MONOBASIC POTASSIUM PHOSPHATE, DIBASIC 62.5 MILLIMOLE(S): 236; 224 INJECTION, SOLUTION INTRAVENOUS at 09:35

## 2019-02-25 RX ADMIN — Medication 56 MICROGRAM(S): at 11:21

## 2019-02-25 RX ADMIN — SODIUM CHLORIDE 3 MILLILITER(S): 9 INJECTION INTRAMUSCULAR; INTRAVENOUS; SUBCUTANEOUS at 13:02

## 2019-02-25 RX ADMIN — Medication 50 GRAM(S): at 09:29

## 2019-02-25 RX ADMIN — Medication 100 MILLIEQUIVALENT(S): at 09:29

## 2019-02-25 RX ADMIN — SODIUM CHLORIDE 3 MILLILITER(S): 9 INJECTION INTRAMUSCULAR; INTRAVENOUS; SUBCUTANEOUS at 21:03

## 2019-02-25 RX ADMIN — HEPARIN SODIUM 5000 UNIT(S): 5000 INJECTION INTRAVENOUS; SUBCUTANEOUS at 21:14

## 2019-02-25 RX ADMIN — SODIUM CHLORIDE 3 MILLILITER(S): 9 INJECTION INTRAMUSCULAR; INTRAVENOUS; SUBCUTANEOUS at 05:15

## 2019-02-26 LAB
ANION GAP SERPL CALC-SCNC: 8 MMO/L — SIGNIFICANT CHANGE UP (ref 7–14)
APTT BLD: 29.4 SEC — SIGNIFICANT CHANGE UP (ref 27.5–36.3)
BUN SERPL-MCNC: 3 MG/DL — LOW (ref 7–23)
CALCIUM SERPL-MCNC: 8.3 MG/DL — LOW (ref 8.4–10.5)
CHLORIDE SERPL-SCNC: 103 MMOL/L — SIGNIFICANT CHANGE UP (ref 98–107)
CO2 SERPL-SCNC: 28 MMOL/L — SIGNIFICANT CHANGE UP (ref 22–31)
CREAT SERPL-MCNC: 0.52 MG/DL — SIGNIFICANT CHANGE UP (ref 0.5–1.3)
GLUCOSE SERPL-MCNC: 128 MG/DL — HIGH (ref 70–99)
HCT VFR BLD CALC: 31 % — LOW (ref 39–50)
HGB BLD-MCNC: 9.7 G/DL — LOW (ref 13–17)
INR BLD: 1.2 — HIGH (ref 0.88–1.17)
MAGNESIUM SERPL-MCNC: 1.7 MG/DL — SIGNIFICANT CHANGE UP (ref 1.6–2.6)
MCHC RBC-ENTMCNC: 25.7 PG — LOW (ref 27–34)
MCHC RBC-ENTMCNC: 31.3 % — LOW (ref 32–36)
MCV RBC AUTO: 82 FL — SIGNIFICANT CHANGE UP (ref 80–100)
NRBC # FLD: 0 K/UL — LOW (ref 25–125)
PHOSPHATE SERPL-MCNC: 2.9 MG/DL — SIGNIFICANT CHANGE UP (ref 2.5–4.5)
PLATELET # BLD AUTO: 445 K/UL — HIGH (ref 150–400)
PMV BLD: 9.6 FL — SIGNIFICANT CHANGE UP (ref 7–13)
POTASSIUM SERPL-MCNC: 3.4 MMOL/L — LOW (ref 3.5–5.3)
POTASSIUM SERPL-SCNC: 3.4 MMOL/L — LOW (ref 3.5–5.3)
PROTHROM AB SERPL-ACNC: 13.7 SEC — HIGH (ref 9.8–13.1)
RBC # BLD: 3.78 M/UL — LOW (ref 4.2–5.8)
RBC # FLD: 14.9 % — HIGH (ref 10.3–14.5)
SODIUM SERPL-SCNC: 139 MMOL/L — SIGNIFICANT CHANGE UP (ref 135–145)
WBC # BLD: 11.08 K/UL — HIGH (ref 3.8–10.5)
WBC # FLD AUTO: 11.08 K/UL — HIGH (ref 3.8–10.5)

## 2019-02-26 PROCEDURE — 74018 RADEX ABDOMEN 1 VIEW: CPT | Mod: 26

## 2019-02-26 RX ORDER — SODIUM CHLORIDE 9 MG/ML
1000 INJECTION, SOLUTION INTRAVENOUS
Qty: 0 | Refills: 0 | Status: DISCONTINUED | OUTPATIENT
Start: 2019-02-26 | End: 2019-03-04

## 2019-02-26 RX ORDER — POTASSIUM CHLORIDE 20 MEQ
10 PACKET (EA) ORAL
Qty: 0 | Refills: 0 | Status: COMPLETED | OUTPATIENT
Start: 2019-02-26 | End: 2019-02-26

## 2019-02-26 RX ORDER — HEPARIN SODIUM 5000 [USP'U]/ML
5000 INJECTION INTRAVENOUS; SUBCUTANEOUS EVERY 8 HOURS
Qty: 0 | Refills: 0 | Status: DISCONTINUED | OUTPATIENT
Start: 2019-02-26 | End: 2019-03-05

## 2019-02-26 RX ORDER — MAGNESIUM SULFATE 500 MG/ML
2 VIAL (ML) INJECTION ONCE
Qty: 0 | Refills: 0 | Status: COMPLETED | OUTPATIENT
Start: 2019-02-26 | End: 2019-02-26

## 2019-02-26 RX ORDER — HYDROMORPHONE HYDROCHLORIDE 2 MG/ML
30 INJECTION INTRAMUSCULAR; INTRAVENOUS; SUBCUTANEOUS
Qty: 0 | Refills: 0 | Status: DISCONTINUED | OUTPATIENT
Start: 2019-02-26 | End: 2019-03-04

## 2019-02-26 RX ORDER — METOCLOPRAMIDE HCL 10 MG
10 TABLET ORAL ONCE
Qty: 0 | Refills: 0 | Status: COMPLETED | OUTPATIENT
Start: 2019-02-26 | End: 2019-02-26

## 2019-02-26 RX ORDER — HYDROMORPHONE HYDROCHLORIDE 2 MG/ML
0.5 INJECTION INTRAMUSCULAR; INTRAVENOUS; SUBCUTANEOUS
Qty: 0 | Refills: 0 | Status: DISCONTINUED | OUTPATIENT
Start: 2019-02-26 | End: 2019-03-04

## 2019-02-26 RX ADMIN — SODIUM CHLORIDE 3 MILLILITER(S): 9 INJECTION INTRAMUSCULAR; INTRAVENOUS; SUBCUTANEOUS at 21:47

## 2019-02-26 RX ADMIN — HYDROMORPHONE HYDROCHLORIDE 30 MILLILITER(S): 2 INJECTION INTRAMUSCULAR; INTRAVENOUS; SUBCUTANEOUS at 11:09

## 2019-02-26 RX ADMIN — Medication 100 MILLIEQUIVALENT(S): at 10:06

## 2019-02-26 RX ADMIN — Medication 50 GRAM(S): at 10:06

## 2019-02-26 RX ADMIN — SODIUM CHLORIDE 3 MILLILITER(S): 9 INJECTION INTRAMUSCULAR; INTRAVENOUS; SUBCUTANEOUS at 05:13

## 2019-02-26 RX ADMIN — HEPARIN SODIUM 5000 UNIT(S): 5000 INJECTION INTRAVENOUS; SUBCUTANEOUS at 10:07

## 2019-02-26 RX ADMIN — HYDROMORPHONE HYDROCHLORIDE 30 MILLILITER(S): 2 INJECTION INTRAMUSCULAR; INTRAVENOUS; SUBCUTANEOUS at 19:25

## 2019-02-26 RX ADMIN — Medication 56 MICROGRAM(S): at 06:34

## 2019-02-26 RX ADMIN — Medication 100 MILLIEQUIVALENT(S): at 12:27

## 2019-02-26 RX ADMIN — Medication 100 MILLIEQUIVALENT(S): at 11:11

## 2019-02-26 RX ADMIN — SODIUM CHLORIDE 125 MILLILITER(S): 9 INJECTION, SOLUTION INTRAVENOUS at 10:06

## 2019-02-26 RX ADMIN — HEPARIN SODIUM 5000 UNIT(S): 5000 INJECTION INTRAVENOUS; SUBCUTANEOUS at 22:09

## 2019-02-27 LAB
ANION GAP SERPL CALC-SCNC: 10 MMO/L — SIGNIFICANT CHANGE UP (ref 7–14)
APTT BLD: 29.5 SEC — SIGNIFICANT CHANGE UP (ref 27.5–36.3)
BUN SERPL-MCNC: 3 MG/DL — LOW (ref 7–23)
CALCIUM SERPL-MCNC: 8.4 MG/DL — SIGNIFICANT CHANGE UP (ref 8.4–10.5)
CHLORIDE SERPL-SCNC: 102 MMOL/L — SIGNIFICANT CHANGE UP (ref 98–107)
CO2 SERPL-SCNC: 23 MMOL/L — SIGNIFICANT CHANGE UP (ref 22–31)
CREAT SERPL-MCNC: 0.48 MG/DL — LOW (ref 0.5–1.3)
GLUCOSE SERPL-MCNC: 152 MG/DL — HIGH (ref 70–99)
HCT VFR BLD CALC: 31.5 % — LOW (ref 39–50)
HGB BLD-MCNC: 10.4 G/DL — LOW (ref 13–17)
INR BLD: 1.18 — HIGH (ref 0.88–1.17)
MAGNESIUM SERPL-MCNC: 1.7 MG/DL — SIGNIFICANT CHANGE UP (ref 1.6–2.6)
MCHC RBC-ENTMCNC: 26.1 PG — LOW (ref 27–34)
MCHC RBC-ENTMCNC: 33 % — SIGNIFICANT CHANGE UP (ref 32–36)
MCV RBC AUTO: 79.1 FL — LOW (ref 80–100)
NRBC # FLD: 0 K/UL — LOW (ref 25–125)
PHOSPHATE SERPL-MCNC: 2.5 MG/DL — SIGNIFICANT CHANGE UP (ref 2.5–4.5)
PLATELET # BLD AUTO: 528 K/UL — HIGH (ref 150–400)
PMV BLD: 10.1 FL — SIGNIFICANT CHANGE UP (ref 7–13)
POTASSIUM SERPL-MCNC: 4 MMOL/L — SIGNIFICANT CHANGE UP (ref 3.5–5.3)
POTASSIUM SERPL-SCNC: 4 MMOL/L — SIGNIFICANT CHANGE UP (ref 3.5–5.3)
PROTHROM AB SERPL-ACNC: 13.2 SEC — HIGH (ref 9.8–13.1)
RBC # BLD: 3.98 M/UL — LOW (ref 4.2–5.8)
RBC # FLD: 14.8 % — HIGH (ref 10.3–14.5)
SODIUM SERPL-SCNC: 135 MMOL/L — SIGNIFICANT CHANGE UP (ref 135–145)
WBC # BLD: 15.94 K/UL — HIGH (ref 3.8–10.5)
WBC # FLD AUTO: 15.94 K/UL — HIGH (ref 3.8–10.5)

## 2019-02-27 PROCEDURE — 71045 X-RAY EXAM CHEST 1 VIEW: CPT | Mod: 26

## 2019-02-27 RX ORDER — TETRACAINE/BENZOCAINE/BUTAMBEN 2%-14%-2%
1 OINTMENT (GRAM) TOPICAL
Qty: 0 | Refills: 0 | Status: DISCONTINUED | OUTPATIENT
Start: 2019-02-27 | End: 2019-03-03

## 2019-02-27 RX ORDER — PANTOPRAZOLE SODIUM 20 MG/1
40 TABLET, DELAYED RELEASE ORAL DAILY
Qty: 0 | Refills: 0 | Status: DISCONTINUED | OUTPATIENT
Start: 2019-02-27 | End: 2019-03-04

## 2019-02-27 RX ORDER — MAGNESIUM SULFATE 500 MG/ML
2 VIAL (ML) INJECTION ONCE
Qty: 0 | Refills: 0 | Status: COMPLETED | OUTPATIENT
Start: 2019-02-27 | End: 2019-02-27

## 2019-02-27 RX ADMIN — Medication 56 MICROGRAM(S): at 06:29

## 2019-02-27 RX ADMIN — HEPARIN SODIUM 5000 UNIT(S): 5000 INJECTION INTRAVENOUS; SUBCUTANEOUS at 21:17

## 2019-02-27 RX ADMIN — HEPARIN SODIUM 5000 UNIT(S): 5000 INJECTION INTRAVENOUS; SUBCUTANEOUS at 06:30

## 2019-02-27 RX ADMIN — Medication 50 GRAM(S): at 10:33

## 2019-02-27 RX ADMIN — HYDROMORPHONE HYDROCHLORIDE 30 MILLILITER(S): 2 INJECTION INTRAMUSCULAR; INTRAVENOUS; SUBCUTANEOUS at 19:24

## 2019-02-27 RX ADMIN — SODIUM CHLORIDE 3 MILLILITER(S): 9 INJECTION INTRAMUSCULAR; INTRAVENOUS; SUBCUTANEOUS at 21:17

## 2019-02-27 RX ADMIN — HYDROMORPHONE HYDROCHLORIDE 30 MILLILITER(S): 2 INJECTION INTRAMUSCULAR; INTRAVENOUS; SUBCUTANEOUS at 07:13

## 2019-02-27 RX ADMIN — SODIUM CHLORIDE 125 MILLILITER(S): 9 INJECTION, SOLUTION INTRAVENOUS at 18:07

## 2019-02-27 RX ADMIN — HEPARIN SODIUM 5000 UNIT(S): 5000 INJECTION INTRAVENOUS; SUBCUTANEOUS at 14:58

## 2019-02-27 RX ADMIN — SODIUM CHLORIDE 3 MILLILITER(S): 9 INJECTION INTRAMUSCULAR; INTRAVENOUS; SUBCUTANEOUS at 14:58

## 2019-02-27 RX ADMIN — Medication 1 SPRAY(S): at 22:17

## 2019-02-27 RX ADMIN — SODIUM CHLORIDE 3 MILLILITER(S): 9 INJECTION INTRAMUSCULAR; INTRAVENOUS; SUBCUTANEOUS at 06:24

## 2019-02-27 RX ADMIN — PANTOPRAZOLE SODIUM 40 MILLIGRAM(S): 20 TABLET, DELAYED RELEASE ORAL at 12:53

## 2019-02-27 RX ADMIN — Medication 63.75 MILLIMOLE(S): at 12:52

## 2019-02-28 LAB
ANION GAP SERPL CALC-SCNC: 4 MMO/L — LOW (ref 7–14)
ANION GAP SERPL CALC-SCNC: 7 MMO/L — SIGNIFICANT CHANGE UP (ref 7–14)
BUN SERPL-MCNC: 4 MG/DL — LOW (ref 7–23)
BUN SERPL-MCNC: 5 MG/DL — LOW (ref 7–23)
CALCIUM SERPL-MCNC: 6.8 MG/DL — LOW (ref 8.4–10.5)
CALCIUM SERPL-MCNC: 8.3 MG/DL — LOW (ref 8.4–10.5)
CHLORIDE SERPL-SCNC: 100 MMOL/L — SIGNIFICANT CHANGE UP (ref 98–107)
CHLORIDE SERPL-SCNC: 105 MMOL/L — SIGNIFICANT CHANGE UP (ref 98–107)
CO2 SERPL-SCNC: 22 MMOL/L — SIGNIFICANT CHANGE UP (ref 22–31)
CO2 SERPL-SCNC: 26 MMOL/L — SIGNIFICANT CHANGE UP (ref 22–31)
CREAT SERPL-MCNC: 0.5 MG/DL — SIGNIFICANT CHANGE UP (ref 0.5–1.3)
CREAT SERPL-MCNC: 0.58 MG/DL — SIGNIFICANT CHANGE UP (ref 0.5–1.3)
GLUCOSE SERPL-MCNC: 125 MG/DL — HIGH (ref 70–99)
GLUCOSE SERPL-MCNC: 765 MG/DL — CRITICAL HIGH (ref 70–99)
HCT VFR BLD CALC: 26.2 % — LOW (ref 39–50)
HGB BLD-MCNC: 8.4 G/DL — LOW (ref 13–17)
MAGNESIUM SERPL-MCNC: 1.5 MG/DL — LOW (ref 1.6–2.6)
MAGNESIUM SERPL-MCNC: 1.7 MG/DL — SIGNIFICANT CHANGE UP (ref 1.6–2.6)
MCHC RBC-ENTMCNC: 25.8 PG — LOW (ref 27–34)
MCHC RBC-ENTMCNC: 32.1 % — SIGNIFICANT CHANGE UP (ref 32–36)
MCV RBC AUTO: 80.4 FL — SIGNIFICANT CHANGE UP (ref 80–100)
NRBC # FLD: 0 K/UL — LOW (ref 25–125)
PHOSPHATE SERPL-MCNC: 2.2 MG/DL — LOW (ref 2.5–4.5)
PHOSPHATE SERPL-MCNC: 2.9 MG/DL — SIGNIFICANT CHANGE UP (ref 2.5–4.5)
PLATELET # BLD AUTO: 499 K/UL — HIGH (ref 150–400)
PMV BLD: 10.6 FL — SIGNIFICANT CHANGE UP (ref 7–13)
POTASSIUM SERPL-MCNC: 4.2 MMOL/L — SIGNIFICANT CHANGE UP (ref 3.5–5.3)
POTASSIUM SERPL-MCNC: 7.3 MMOL/L — CRITICAL HIGH (ref 3.5–5.3)
POTASSIUM SERPL-SCNC: 4.2 MMOL/L — SIGNIFICANT CHANGE UP (ref 3.5–5.3)
POTASSIUM SERPL-SCNC: 7.3 MMOL/L — CRITICAL HIGH (ref 3.5–5.3)
RBC # BLD: 3.26 M/UL — LOW (ref 4.2–5.8)
RBC # FLD: 15.5 % — HIGH (ref 10.3–14.5)
SODIUM SERPL-SCNC: 126 MMOL/L — LOW (ref 135–145)
SODIUM SERPL-SCNC: 138 MMOL/L — SIGNIFICANT CHANGE UP (ref 135–145)
WBC # BLD: 11.77 K/UL — HIGH (ref 3.8–10.5)
WBC # FLD AUTO: 11.77 K/UL — HIGH (ref 3.8–10.5)

## 2019-02-28 PROCEDURE — 93010 ELECTROCARDIOGRAM REPORT: CPT

## 2019-02-28 RX ORDER — KETOROLAC TROMETHAMINE 30 MG/ML
15 SYRINGE (ML) INJECTION EVERY 6 HOURS
Qty: 0 | Refills: 0 | Status: DISCONTINUED | OUTPATIENT
Start: 2019-02-28 | End: 2019-03-02

## 2019-02-28 RX ORDER — ACETAMINOPHEN 500 MG
1000 TABLET ORAL ONCE
Qty: 0 | Refills: 0 | Status: COMPLETED | OUTPATIENT
Start: 2019-03-01 | End: 2019-03-01

## 2019-02-28 RX ORDER — ACETAMINOPHEN 500 MG
1000 TABLET ORAL ONCE
Qty: 0 | Refills: 0 | Status: COMPLETED | OUTPATIENT
Start: 2019-02-28 | End: 2019-03-02

## 2019-02-28 RX ADMIN — HYDROMORPHONE HYDROCHLORIDE 30 MILLILITER(S): 2 INJECTION INTRAMUSCULAR; INTRAVENOUS; SUBCUTANEOUS at 07:34

## 2019-02-28 RX ADMIN — HEPARIN SODIUM 5000 UNIT(S): 5000 INJECTION INTRAVENOUS; SUBCUTANEOUS at 23:32

## 2019-02-28 RX ADMIN — Medication 15 MILLIGRAM(S): at 23:32

## 2019-02-28 RX ADMIN — PANTOPRAZOLE SODIUM 40 MILLIGRAM(S): 20 TABLET, DELAYED RELEASE ORAL at 12:37

## 2019-02-28 RX ADMIN — Medication 1 SPRAY(S): at 14:02

## 2019-02-28 RX ADMIN — HEPARIN SODIUM 5000 UNIT(S): 5000 INJECTION INTRAVENOUS; SUBCUTANEOUS at 05:31

## 2019-02-28 RX ADMIN — SODIUM CHLORIDE 3 MILLILITER(S): 9 INJECTION INTRAMUSCULAR; INTRAVENOUS; SUBCUTANEOUS at 05:29

## 2019-02-28 RX ADMIN — Medication 56 MICROGRAM(S): at 05:31

## 2019-02-28 RX ADMIN — SODIUM CHLORIDE 3 MILLILITER(S): 9 INJECTION INTRAMUSCULAR; INTRAVENOUS; SUBCUTANEOUS at 12:26

## 2019-02-28 RX ADMIN — SODIUM CHLORIDE 3 MILLILITER(S): 9 INJECTION INTRAMUSCULAR; INTRAVENOUS; SUBCUTANEOUS at 23:25

## 2019-02-28 RX ADMIN — HEPARIN SODIUM 5000 UNIT(S): 5000 INJECTION INTRAVENOUS; SUBCUTANEOUS at 12:38

## 2019-02-28 RX ADMIN — HYDROMORPHONE HYDROCHLORIDE 30 MILLILITER(S): 2 INJECTION INTRAMUSCULAR; INTRAVENOUS; SUBCUTANEOUS at 19:18

## 2019-02-28 NOTE — CHART NOTE - NSCHARTNOTEFT_GEN_A_CORE
Secondary malignant neoplasm of retroperitoneum and peritoneum RRT called for hyperkalemia and hyperglycemia. Pt is a 57 y/o M with h/o secondary malignant neoplasm of retroperitoneum and peritoneum admitted to surgery for R hemicolectomy, omentectomy and splenectomy, POD6. Pt had routine labs drawn this morning with an array of abnormalities. Pt denies acute complaints. Vitals stable, refer to flowsheet. FS obtained 110. EKG obtained was NSR with RRT called for hyperkalemia and hyperglycemia. Pt is a 59 y/o M with h/o secondary malignant neoplasm of retroperitoneum and peritoneum admitted to surgery for R hemicolectomy, omentectomy and splenectomy, POD6. Pt had routine labs drawn this morning with an array of abnormalities. Pt denies acute complaints. Vitals stable, refer to flow sheet. FS obtained 110. EKG obtained was NSR with no peaked waves. Pt currently on a D51/2NS gtt with potassium. Given normal FS, more likely that lab was drawn off of arm being infused with fluids. Plan to repeat labs prior to any intervention    EUGENIO Whyte  [id number]

## 2019-03-01 LAB
ANION GAP SERPL CALC-SCNC: 8 MMO/L — SIGNIFICANT CHANGE UP (ref 7–14)
BUN SERPL-MCNC: 4 MG/DL — LOW (ref 7–23)
CALCIUM SERPL-MCNC: 8.2 MG/DL — LOW (ref 8.4–10.5)
CHLORIDE SERPL-SCNC: 103 MMOL/L — SIGNIFICANT CHANGE UP (ref 98–107)
CO2 SERPL-SCNC: 25 MMOL/L — SIGNIFICANT CHANGE UP (ref 22–31)
CREAT SERPL-MCNC: 0.57 MG/DL — SIGNIFICANT CHANGE UP (ref 0.5–1.3)
GLUCOSE SERPL-MCNC: 121 MG/DL — HIGH (ref 70–99)
HCT VFR BLD CALC: 26.8 % — LOW (ref 39–50)
HGB BLD-MCNC: 8.6 G/DL — LOW (ref 13–17)
MAGNESIUM SERPL-MCNC: 1.6 MG/DL — SIGNIFICANT CHANGE UP (ref 1.6–2.6)
MCHC RBC-ENTMCNC: 25.7 PG — LOW (ref 27–34)
MCHC RBC-ENTMCNC: 32.1 % — SIGNIFICANT CHANGE UP (ref 32–36)
MCV RBC AUTO: 80 FL — SIGNIFICANT CHANGE UP (ref 80–100)
NRBC # FLD: 0.02 K/UL — LOW (ref 25–125)
PHOSPHATE SERPL-MCNC: 2.9 MG/DL — SIGNIFICANT CHANGE UP (ref 2.5–4.5)
PLATELET # BLD AUTO: 574 K/UL — HIGH (ref 150–400)
PMV BLD: 10.3 FL — SIGNIFICANT CHANGE UP (ref 7–13)
POTASSIUM SERPL-MCNC: 3.7 MMOL/L — SIGNIFICANT CHANGE UP (ref 3.5–5.3)
POTASSIUM SERPL-SCNC: 3.7 MMOL/L — SIGNIFICANT CHANGE UP (ref 3.5–5.3)
RBC # BLD: 3.35 M/UL — LOW (ref 4.2–5.8)
RBC # FLD: 15.4 % — HIGH (ref 10.3–14.5)
SODIUM SERPL-SCNC: 136 MMOL/L — SIGNIFICANT CHANGE UP (ref 135–145)
WBC # BLD: 19.1 K/UL — HIGH (ref 3.8–10.5)
WBC # FLD AUTO: 19.1 K/UL — HIGH (ref 3.8–10.5)

## 2019-03-01 RX ORDER — POTASSIUM CHLORIDE 20 MEQ
10 PACKET (EA) ORAL ONCE
Qty: 0 | Refills: 0 | Status: COMPLETED | OUTPATIENT
Start: 2019-03-01 | End: 2019-03-01

## 2019-03-01 RX ORDER — MAGNESIUM SULFATE 500 MG/ML
2 VIAL (ML) INJECTION ONCE
Qty: 0 | Refills: 0 | Status: COMPLETED | OUTPATIENT
Start: 2019-03-01 | End: 2019-03-01

## 2019-03-01 RX ADMIN — Medication 1000 MILLIGRAM(S): at 09:00

## 2019-03-01 RX ADMIN — SODIUM CHLORIDE 3 MILLILITER(S): 9 INJECTION INTRAMUSCULAR; INTRAVENOUS; SUBCUTANEOUS at 04:48

## 2019-03-01 RX ADMIN — HYDROMORPHONE HYDROCHLORIDE 30 MILLILITER(S): 2 INJECTION INTRAMUSCULAR; INTRAVENOUS; SUBCUTANEOUS at 19:23

## 2019-03-01 RX ADMIN — Medication 400 MILLIGRAM(S): at 14:00

## 2019-03-01 RX ADMIN — Medication 56 MICROGRAM(S): at 05:17

## 2019-03-01 RX ADMIN — PANTOPRAZOLE SODIUM 40 MILLIGRAM(S): 20 TABLET, DELAYED RELEASE ORAL at 14:00

## 2019-03-01 RX ADMIN — Medication 50 GRAM(S): at 17:06

## 2019-03-01 RX ADMIN — Medication 400 MILLIGRAM(S): at 08:33

## 2019-03-01 RX ADMIN — Medication 15 MILLIGRAM(S): at 05:17

## 2019-03-01 RX ADMIN — SODIUM CHLORIDE 3 MILLILITER(S): 9 INJECTION INTRAMUSCULAR; INTRAVENOUS; SUBCUTANEOUS at 12:26

## 2019-03-01 RX ADMIN — Medication 1000 MILLIGRAM(S): at 15:00

## 2019-03-01 RX ADMIN — HYDROMORPHONE HYDROCHLORIDE 30 MILLILITER(S): 2 INJECTION INTRAMUSCULAR; INTRAVENOUS; SUBCUTANEOUS at 07:21

## 2019-03-01 RX ADMIN — HEPARIN SODIUM 5000 UNIT(S): 5000 INJECTION INTRAVENOUS; SUBCUTANEOUS at 05:17

## 2019-03-01 RX ADMIN — Medication 100 MILLIEQUIVALENT(S): at 17:05

## 2019-03-01 RX ADMIN — HEPARIN SODIUM 5000 UNIT(S): 5000 INJECTION INTRAVENOUS; SUBCUTANEOUS at 14:00

## 2019-03-02 ENCOUNTER — TRANSCRIPTION ENCOUNTER (OUTPATIENT)
Age: 59
End: 2019-03-02

## 2019-03-02 LAB
ANION GAP SERPL CALC-SCNC: 8 MMO/L — SIGNIFICANT CHANGE UP (ref 7–14)
BUN SERPL-MCNC: 5 MG/DL — LOW (ref 7–23)
CALCIUM SERPL-MCNC: 8.4 MG/DL — SIGNIFICANT CHANGE UP (ref 8.4–10.5)
CHLORIDE SERPL-SCNC: 102 MMOL/L — SIGNIFICANT CHANGE UP (ref 98–107)
CO2 SERPL-SCNC: 25 MMOL/L — SIGNIFICANT CHANGE UP (ref 22–31)
CREAT SERPL-MCNC: 0.58 MG/DL — SIGNIFICANT CHANGE UP (ref 0.5–1.3)
GLUCOSE SERPL-MCNC: 113 MG/DL — HIGH (ref 70–99)
HCT VFR BLD CALC: 29.5 % — LOW (ref 39–50)
HGB BLD-MCNC: 9.5 G/DL — LOW (ref 13–17)
MAGNESIUM SERPL-MCNC: 1.9 MG/DL — SIGNIFICANT CHANGE UP (ref 1.6–2.6)
MCHC RBC-ENTMCNC: 26.2 PG — LOW (ref 27–34)
MCHC RBC-ENTMCNC: 32.2 % — SIGNIFICANT CHANGE UP (ref 32–36)
MCV RBC AUTO: 81.5 FL — SIGNIFICANT CHANGE UP (ref 80–100)
NRBC # FLD: 0 K/UL — LOW (ref 25–125)
PHOSPHATE SERPL-MCNC: 2.9 MG/DL — SIGNIFICANT CHANGE UP (ref 2.5–4.5)
PLATELET # BLD AUTO: 705 K/UL — HIGH (ref 150–400)
PMV BLD: 10.4 FL — SIGNIFICANT CHANGE UP (ref 7–13)
POTASSIUM SERPL-MCNC: 4.1 MMOL/L — SIGNIFICANT CHANGE UP (ref 3.5–5.3)
POTASSIUM SERPL-SCNC: 4.1 MMOL/L — SIGNIFICANT CHANGE UP (ref 3.5–5.3)
RBC # BLD: 3.62 M/UL — LOW (ref 4.2–5.8)
RBC # FLD: 15.8 % — HIGH (ref 10.3–14.5)
SODIUM SERPL-SCNC: 135 MMOL/L — SIGNIFICANT CHANGE UP (ref 135–145)
WBC # BLD: 13.81 K/UL — HIGH (ref 3.8–10.5)
WBC # FLD AUTO: 13.81 K/UL — HIGH (ref 3.8–10.5)

## 2019-03-02 PROCEDURE — 74177 CT ABD & PELVIS W/CONTRAST: CPT | Mod: 26

## 2019-03-02 RX ORDER — MAGNESIUM SULFATE 500 MG/ML
1 VIAL (ML) INJECTION ONCE
Qty: 0 | Refills: 0 | Status: COMPLETED | OUTPATIENT
Start: 2019-03-02 | End: 2019-03-02

## 2019-03-02 RX ORDER — ACETAMINOPHEN 500 MG
1000 TABLET ORAL ONCE
Qty: 0 | Refills: 0 | Status: DISCONTINUED | OUTPATIENT
Start: 2019-03-02 | End: 2019-03-04

## 2019-03-02 RX ADMIN — SODIUM CHLORIDE 125 MILLILITER(S): 9 INJECTION, SOLUTION INTRAVENOUS at 13:53

## 2019-03-02 RX ADMIN — Medication 400 MILLIGRAM(S): at 23:39

## 2019-03-02 RX ADMIN — SODIUM CHLORIDE 3 MILLILITER(S): 9 INJECTION INTRAMUSCULAR; INTRAVENOUS; SUBCUTANEOUS at 05:57

## 2019-03-02 RX ADMIN — HEPARIN SODIUM 5000 UNIT(S): 5000 INJECTION INTRAVENOUS; SUBCUTANEOUS at 05:58

## 2019-03-02 RX ADMIN — HEPARIN SODIUM 5000 UNIT(S): 5000 INJECTION INTRAVENOUS; SUBCUTANEOUS at 13:51

## 2019-03-02 RX ADMIN — Medication 1000 MILLIGRAM(S): at 06:45

## 2019-03-02 RX ADMIN — Medication 1 SPRAY(S): at 11:24

## 2019-03-02 RX ADMIN — Medication 63.75 MILLIMOLE(S): at 16:13

## 2019-03-02 RX ADMIN — HEPARIN SODIUM 5000 UNIT(S): 5000 INJECTION INTRAVENOUS; SUBCUTANEOUS at 23:39

## 2019-03-02 RX ADMIN — HYDROMORPHONE HYDROCHLORIDE 30 MILLILITER(S): 2 INJECTION INTRAMUSCULAR; INTRAVENOUS; SUBCUTANEOUS at 07:25

## 2019-03-02 RX ADMIN — SODIUM CHLORIDE 3 MILLILITER(S): 9 INJECTION INTRAMUSCULAR; INTRAVENOUS; SUBCUTANEOUS at 22:27

## 2019-03-02 RX ADMIN — Medication 100 GRAM(S): at 15:03

## 2019-03-02 RX ADMIN — SODIUM CHLORIDE 3 MILLILITER(S): 9 INJECTION INTRAMUSCULAR; INTRAVENOUS; SUBCUTANEOUS at 00:16

## 2019-03-02 RX ADMIN — Medication 400 MILLIGRAM(S): at 05:59

## 2019-03-02 RX ADMIN — Medication 56 MICROGRAM(S): at 06:06

## 2019-03-02 RX ADMIN — HYDROMORPHONE HYDROCHLORIDE 30 MILLILITER(S): 2 INJECTION INTRAMUSCULAR; INTRAVENOUS; SUBCUTANEOUS at 19:19

## 2019-03-02 RX ADMIN — SODIUM CHLORIDE 3 MILLILITER(S): 9 INJECTION INTRAMUSCULAR; INTRAVENOUS; SUBCUTANEOUS at 13:52

## 2019-03-02 RX ADMIN — PANTOPRAZOLE SODIUM 40 MILLIGRAM(S): 20 TABLET, DELAYED RELEASE ORAL at 11:23

## 2019-03-03 LAB
AMYLASE FLD-CCNC: 27 U/L — SIGNIFICANT CHANGE UP
AMYLASE P1 CFR SERPL: 82 U/L — SIGNIFICANT CHANGE UP (ref 25–125)
ANION GAP SERPL CALC-SCNC: 12 MMO/L — SIGNIFICANT CHANGE UP (ref 7–14)
BUN SERPL-MCNC: 3 MG/DL — LOW (ref 7–23)
CALCIUM SERPL-MCNC: 8.6 MG/DL — SIGNIFICANT CHANGE UP (ref 8.4–10.5)
CHLORIDE SERPL-SCNC: 105 MMOL/L — SIGNIFICANT CHANGE UP (ref 98–107)
CO2 SERPL-SCNC: 24 MMOL/L — SIGNIFICANT CHANGE UP (ref 22–31)
CREAT SERPL-MCNC: 0.61 MG/DL — SIGNIFICANT CHANGE UP (ref 0.5–1.3)
GLUCOSE SERPL-MCNC: 117 MG/DL — HIGH (ref 70–99)
HCT VFR BLD CALC: 28.8 % — LOW (ref 39–50)
HGB BLD-MCNC: 9.2 G/DL — LOW (ref 13–17)
MAGNESIUM SERPL-MCNC: 1.7 MG/DL — SIGNIFICANT CHANGE UP (ref 1.6–2.6)
MCHC RBC-ENTMCNC: 25.6 PG — LOW (ref 27–34)
MCHC RBC-ENTMCNC: 31.9 % — LOW (ref 32–36)
MCV RBC AUTO: 80 FL — SIGNIFICANT CHANGE UP (ref 80–100)
NRBC # FLD: 0.02 K/UL — LOW (ref 25–125)
PHOSPHATE SERPL-MCNC: 3.6 MG/DL — SIGNIFICANT CHANGE UP (ref 2.5–4.5)
PLATELET # BLD AUTO: 762 K/UL — HIGH (ref 150–400)
PMV BLD: 9.9 FL — SIGNIFICANT CHANGE UP (ref 7–13)
POTASSIUM SERPL-MCNC: 4 MMOL/L — SIGNIFICANT CHANGE UP (ref 3.5–5.3)
POTASSIUM SERPL-SCNC: 4 MMOL/L — SIGNIFICANT CHANGE UP (ref 3.5–5.3)
RBC # BLD: 3.6 M/UL — LOW (ref 4.2–5.8)
RBC # FLD: 16.1 % — HIGH (ref 10.3–14.5)
SODIUM SERPL-SCNC: 141 MMOL/L — SIGNIFICANT CHANGE UP (ref 135–145)
WBC # BLD: 11.87 K/UL — HIGH (ref 3.8–10.5)
WBC # FLD AUTO: 11.87 K/UL — HIGH (ref 3.8–10.5)

## 2019-03-03 RX ORDER — MAGNESIUM SULFATE 500 MG/ML
2 VIAL (ML) INJECTION ONCE
Qty: 0 | Refills: 0 | Status: COMPLETED | OUTPATIENT
Start: 2019-03-03 | End: 2019-03-03

## 2019-03-03 RX ADMIN — HEPARIN SODIUM 5000 UNIT(S): 5000 INJECTION INTRAVENOUS; SUBCUTANEOUS at 06:06

## 2019-03-03 RX ADMIN — PANTOPRAZOLE SODIUM 40 MILLIGRAM(S): 20 TABLET, DELAYED RELEASE ORAL at 14:59

## 2019-03-03 RX ADMIN — HYDROMORPHONE HYDROCHLORIDE 30 MILLILITER(S): 2 INJECTION INTRAMUSCULAR; INTRAVENOUS; SUBCUTANEOUS at 19:23

## 2019-03-03 RX ADMIN — SODIUM CHLORIDE 3 MILLILITER(S): 9 INJECTION INTRAMUSCULAR; INTRAVENOUS; SUBCUTANEOUS at 06:08

## 2019-03-03 RX ADMIN — Medication 50 GRAM(S): at 14:59

## 2019-03-03 RX ADMIN — SODIUM CHLORIDE 3 MILLILITER(S): 9 INJECTION INTRAMUSCULAR; INTRAVENOUS; SUBCUTANEOUS at 22:17

## 2019-03-03 RX ADMIN — Medication 1000 MILLIGRAM(S): at 00:00

## 2019-03-03 RX ADMIN — HYDROMORPHONE HYDROCHLORIDE 30 MILLILITER(S): 2 INJECTION INTRAMUSCULAR; INTRAVENOUS; SUBCUTANEOUS at 07:25

## 2019-03-03 RX ADMIN — HEPARIN SODIUM 5000 UNIT(S): 5000 INJECTION INTRAVENOUS; SUBCUTANEOUS at 22:19

## 2019-03-03 RX ADMIN — HEPARIN SODIUM 5000 UNIT(S): 5000 INJECTION INTRAVENOUS; SUBCUTANEOUS at 15:00

## 2019-03-03 RX ADMIN — SODIUM CHLORIDE 3 MILLILITER(S): 9 INJECTION INTRAMUSCULAR; INTRAVENOUS; SUBCUTANEOUS at 15:02

## 2019-03-03 RX ADMIN — SODIUM CHLORIDE 75 MILLILITER(S): 9 INJECTION, SOLUTION INTRAVENOUS at 15:15

## 2019-03-03 RX ADMIN — Medication 56 MICROGRAM(S): at 06:08

## 2019-03-03 RX ADMIN — SODIUM CHLORIDE 125 MILLILITER(S): 9 INJECTION, SOLUTION INTRAVENOUS at 07:26

## 2019-03-04 LAB
ANION GAP SERPL CALC-SCNC: 8 MMO/L — SIGNIFICANT CHANGE UP (ref 7–14)
BUN SERPL-MCNC: 3 MG/DL — LOW (ref 7–23)
CALCIUM SERPL-MCNC: 8.6 MG/DL — SIGNIFICANT CHANGE UP (ref 8.4–10.5)
CHLORIDE SERPL-SCNC: 102 MMOL/L — SIGNIFICANT CHANGE UP (ref 98–107)
CO2 SERPL-SCNC: 26 MMOL/L — SIGNIFICANT CHANGE UP (ref 22–31)
CREAT SERPL-MCNC: 0.58 MG/DL — SIGNIFICANT CHANGE UP (ref 0.5–1.3)
GLUCOSE SERPL-MCNC: 92 MG/DL — SIGNIFICANT CHANGE UP (ref 70–99)
HCT VFR BLD CALC: 28.3 % — LOW (ref 39–50)
HGB BLD-MCNC: 9.3 G/DL — LOW (ref 13–17)
MAGNESIUM SERPL-MCNC: 1.7 MG/DL — SIGNIFICANT CHANGE UP (ref 1.6–2.6)
MCHC RBC-ENTMCNC: 26.1 PG — LOW (ref 27–34)
MCHC RBC-ENTMCNC: 32.9 % — SIGNIFICANT CHANGE UP (ref 32–36)
MCV RBC AUTO: 79.5 FL — LOW (ref 80–100)
NRBC # FLD: 0 K/UL — LOW (ref 25–125)
PHOSPHATE SERPL-MCNC: 3.5 MG/DL — SIGNIFICANT CHANGE UP (ref 2.5–4.5)
PLATELET # BLD AUTO: 741 K/UL — HIGH (ref 150–400)
PMV BLD: 9.3 FL — SIGNIFICANT CHANGE UP (ref 7–13)
POTASSIUM SERPL-MCNC: 4.1 MMOL/L — SIGNIFICANT CHANGE UP (ref 3.5–5.3)
POTASSIUM SERPL-SCNC: 4.1 MMOL/L — SIGNIFICANT CHANGE UP (ref 3.5–5.3)
RBC # BLD: 3.56 M/UL — LOW (ref 4.2–5.8)
RBC # FLD: 16.3 % — HIGH (ref 10.3–14.5)
SODIUM SERPL-SCNC: 136 MMOL/L — SIGNIFICANT CHANGE UP (ref 135–145)
WBC # BLD: 11.74 K/UL — HIGH (ref 3.8–10.5)
WBC # FLD AUTO: 11.74 K/UL — HIGH (ref 3.8–10.5)

## 2019-03-04 RX ORDER — OXYCODONE HYDROCHLORIDE 5 MG/1
10 TABLET ORAL EVERY 6 HOURS
Qty: 0 | Refills: 0 | Status: DISCONTINUED | OUTPATIENT
Start: 2019-03-04 | End: 2019-03-06

## 2019-03-04 RX ORDER — OXYCODONE HYDROCHLORIDE 5 MG/1
10 TABLET ORAL EVERY 6 HOURS
Qty: 0 | Refills: 0 | Status: DISCONTINUED | OUTPATIENT
Start: 2019-03-04 | End: 2019-03-04

## 2019-03-04 RX ORDER — OXYCODONE HYDROCHLORIDE 5 MG/1
5 TABLET ORAL EVERY 4 HOURS
Qty: 0 | Refills: 0 | Status: DISCONTINUED | OUTPATIENT
Start: 2019-03-04 | End: 2019-03-06

## 2019-03-04 RX ORDER — ACETAMINOPHEN 500 MG
650 TABLET ORAL EVERY 6 HOURS
Qty: 0 | Refills: 0 | Status: DISCONTINUED | OUTPATIENT
Start: 2019-03-04 | End: 2019-03-06

## 2019-03-04 RX ORDER — MAGNESIUM SULFATE 500 MG/ML
2 VIAL (ML) INJECTION ONCE
Qty: 0 | Refills: 0 | Status: COMPLETED | OUTPATIENT
Start: 2019-03-04 | End: 2019-03-04

## 2019-03-04 RX ORDER — LEVOTHYROXINE SODIUM 125 MCG
112 TABLET ORAL DAILY
Qty: 0 | Refills: 0 | Status: DISCONTINUED | OUTPATIENT
Start: 2019-03-04 | End: 2019-03-06

## 2019-03-04 RX ORDER — PANTOPRAZOLE SODIUM 20 MG/1
40 TABLET, DELAYED RELEASE ORAL
Qty: 0 | Refills: 0 | Status: DISCONTINUED | OUTPATIENT
Start: 2019-03-04 | End: 2019-03-06

## 2019-03-04 RX ORDER — SIMETHICONE 80 MG/1
80 TABLET, CHEWABLE ORAL ONCE
Qty: 0 | Refills: 0 | Status: DISCONTINUED | OUTPATIENT
Start: 2019-03-04 | End: 2019-03-06

## 2019-03-04 RX ORDER — INSULIN LISPRO 100/ML
VIAL (ML) SUBCUTANEOUS
Qty: 0 | Refills: 0 | Status: DISCONTINUED | OUTPATIENT
Start: 2019-03-04 | End: 2019-03-06

## 2019-03-04 RX ORDER — PSYLLIUM SEED (WITH DEXTROSE)
1 POWDER (GRAM) ORAL DAILY
Qty: 0 | Refills: 0 | Status: DISCONTINUED | OUTPATIENT
Start: 2019-03-04 | End: 2019-03-06

## 2019-03-04 RX ADMIN — HYDROMORPHONE HYDROCHLORIDE 30 MILLILITER(S): 2 INJECTION INTRAMUSCULAR; INTRAVENOUS; SUBCUTANEOUS at 07:11

## 2019-03-04 RX ADMIN — SODIUM CHLORIDE 3 MILLILITER(S): 9 INJECTION INTRAMUSCULAR; INTRAVENOUS; SUBCUTANEOUS at 15:14

## 2019-03-04 RX ADMIN — OXYCODONE HYDROCHLORIDE 10 MILLIGRAM(S): 5 TABLET ORAL at 14:21

## 2019-03-04 RX ADMIN — Medication 650 MILLIGRAM(S): at 15:20

## 2019-03-04 RX ADMIN — SODIUM CHLORIDE 3 MILLILITER(S): 9 INJECTION INTRAMUSCULAR; INTRAVENOUS; SUBCUTANEOUS at 21:00

## 2019-03-04 RX ADMIN — Medication 56 MICROGRAM(S): at 06:07

## 2019-03-04 RX ADMIN — Medication 50 GRAM(S): at 09:18

## 2019-03-04 RX ADMIN — SODIUM CHLORIDE 3 MILLILITER(S): 9 INJECTION INTRAMUSCULAR; INTRAVENOUS; SUBCUTANEOUS at 06:07

## 2019-03-04 RX ADMIN — HEPARIN SODIUM 5000 UNIT(S): 5000 INJECTION INTRAVENOUS; SUBCUTANEOUS at 06:08

## 2019-03-04 RX ADMIN — HEPARIN SODIUM 5000 UNIT(S): 5000 INJECTION INTRAVENOUS; SUBCUTANEOUS at 13:24

## 2019-03-04 RX ADMIN — Medication 650 MILLIGRAM(S): at 16:00

## 2019-03-04 RX ADMIN — PANTOPRAZOLE SODIUM 40 MILLIGRAM(S): 20 TABLET, DELAYED RELEASE ORAL at 09:18

## 2019-03-04 RX ADMIN — OXYCODONE HYDROCHLORIDE 10 MILLIGRAM(S): 5 TABLET ORAL at 19:53

## 2019-03-04 RX ADMIN — HEPARIN SODIUM 5000 UNIT(S): 5000 INJECTION INTRAVENOUS; SUBCUTANEOUS at 21:00

## 2019-03-04 RX ADMIN — Medication 1 PACKET(S): at 13:24

## 2019-03-04 RX ADMIN — OXYCODONE HYDROCHLORIDE 10 MILLIGRAM(S): 5 TABLET ORAL at 13:21

## 2019-03-04 RX ADMIN — OXYCODONE HYDROCHLORIDE 10 MILLIGRAM(S): 5 TABLET ORAL at 21:00

## 2019-03-05 LAB
ANION GAP SERPL CALC-SCNC: 9 MMO/L — SIGNIFICANT CHANGE UP (ref 7–14)
BUN SERPL-MCNC: 8 MG/DL — SIGNIFICANT CHANGE UP (ref 7–23)
CALCIUM SERPL-MCNC: 9 MG/DL — SIGNIFICANT CHANGE UP (ref 8.4–10.5)
CHLORIDE SERPL-SCNC: 100 MMOL/L — SIGNIFICANT CHANGE UP (ref 98–107)
CO2 SERPL-SCNC: 27 MMOL/L — SIGNIFICANT CHANGE UP (ref 22–31)
CREAT SERPL-MCNC: 0.73 MG/DL — SIGNIFICANT CHANGE UP (ref 0.5–1.3)
GLUCOSE SERPL-MCNC: 82 MG/DL — SIGNIFICANT CHANGE UP (ref 70–99)
HCT VFR BLD CALC: 30.6 % — LOW (ref 39–50)
HGB BLD-MCNC: 9.7 G/DL — LOW (ref 13–17)
MAGNESIUM SERPL-MCNC: 1.8 MG/DL — SIGNIFICANT CHANGE UP (ref 1.6–2.6)
MCHC RBC-ENTMCNC: 26.1 PG — LOW (ref 27–34)
MCHC RBC-ENTMCNC: 31.7 % — LOW (ref 32–36)
MCV RBC AUTO: 82.3 FL — SIGNIFICANT CHANGE UP (ref 80–100)
NRBC # FLD: 0 K/UL — LOW (ref 25–125)
PHOSPHATE SERPL-MCNC: 3.6 MG/DL — SIGNIFICANT CHANGE UP (ref 2.5–4.5)
PLATELET # BLD AUTO: 767 K/UL — HIGH (ref 150–400)
PMV BLD: 9.6 FL — SIGNIFICANT CHANGE UP (ref 7–13)
POTASSIUM SERPL-MCNC: 4.5 MMOL/L — SIGNIFICANT CHANGE UP (ref 3.5–5.3)
POTASSIUM SERPL-SCNC: 4.5 MMOL/L — SIGNIFICANT CHANGE UP (ref 3.5–5.3)
RBC # BLD: 3.72 M/UL — LOW (ref 4.2–5.8)
RBC # FLD: 16.4 % — HIGH (ref 10.3–14.5)
SODIUM SERPL-SCNC: 136 MMOL/L — SIGNIFICANT CHANGE UP (ref 135–145)
WBC # BLD: 12.33 K/UL — HIGH (ref 3.8–10.5)
WBC # FLD AUTO: 12.33 K/UL — HIGH (ref 3.8–10.5)

## 2019-03-05 RX ORDER — OXYCODONE HYDROCHLORIDE 5 MG/1
1 TABLET ORAL
Qty: 12 | Refills: 0 | OUTPATIENT
Start: 2019-03-05

## 2019-03-05 RX ORDER — LOSARTAN POTASSIUM 100 MG/1
1 TABLET, FILM COATED ORAL
Qty: 0 | Refills: 0 | COMMUNITY

## 2019-03-05 RX ORDER — ENOXAPARIN SODIUM 100 MG/ML
40 INJECTION SUBCUTANEOUS
Qty: 1120 | Refills: 0 | OUTPATIENT
Start: 2019-03-05 | End: 2019-04-01

## 2019-03-05 RX ORDER — ACETAMINOPHEN 500 MG
2 TABLET ORAL
Qty: 0 | Refills: 0 | COMMUNITY
Start: 2019-03-05

## 2019-03-05 RX ORDER — PSYLLIUM SEED (WITH DEXTROSE)
1 POWDER (GRAM) ORAL
Qty: 0 | Refills: 0 | COMMUNITY
Start: 2019-03-05

## 2019-03-05 RX ORDER — MAGNESIUM SULFATE 500 MG/ML
2 VIAL (ML) INJECTION ONCE
Qty: 0 | Refills: 0 | Status: COMPLETED | OUTPATIENT
Start: 2019-03-05 | End: 2019-03-05

## 2019-03-05 RX ORDER — PANTOPRAZOLE SODIUM 20 MG/1
1 TABLET, DELAYED RELEASE ORAL
Qty: 14 | Refills: 0 | OUTPATIENT
Start: 2019-03-05 | End: 2019-03-18

## 2019-03-05 RX ORDER — PSYLLIUM SEED (WITH DEXTROSE)
0 POWDER (GRAM) ORAL
Qty: 0 | Refills: 0 | COMMUNITY
Start: 2019-03-05

## 2019-03-05 RX ORDER — ENOXAPARIN SODIUM 100 MG/ML
40 INJECTION SUBCUTANEOUS DAILY
Qty: 0 | Refills: 0 | Status: DISCONTINUED | OUTPATIENT
Start: 2019-03-05 | End: 2019-03-06

## 2019-03-05 RX ORDER — LOPERAMIDE HCL 2 MG
1 TABLET ORAL
Qty: 14 | Refills: 0 | OUTPATIENT
Start: 2019-03-05 | End: 2019-03-11

## 2019-03-05 RX ADMIN — PANTOPRAZOLE SODIUM 40 MILLIGRAM(S): 20 TABLET, DELAYED RELEASE ORAL at 05:44

## 2019-03-05 RX ADMIN — OXYCODONE HYDROCHLORIDE 10 MILLIGRAM(S): 5 TABLET ORAL at 08:47

## 2019-03-05 RX ADMIN — OXYCODONE HYDROCHLORIDE 10 MILLIGRAM(S): 5 TABLET ORAL at 17:15

## 2019-03-05 RX ADMIN — Medication 50 GRAM(S): at 11:49

## 2019-03-05 RX ADMIN — Medication 112 MICROGRAM(S): at 05:44

## 2019-03-05 RX ADMIN — Medication 1 PACKET(S): at 11:49

## 2019-03-05 RX ADMIN — SODIUM CHLORIDE 3 MILLILITER(S): 9 INJECTION INTRAMUSCULAR; INTRAVENOUS; SUBCUTANEOUS at 14:15

## 2019-03-05 RX ADMIN — OXYCODONE HYDROCHLORIDE 10 MILLIGRAM(S): 5 TABLET ORAL at 16:35

## 2019-03-05 RX ADMIN — SODIUM CHLORIDE 3 MILLILITER(S): 9 INJECTION INTRAMUSCULAR; INTRAVENOUS; SUBCUTANEOUS at 05:45

## 2019-03-05 RX ADMIN — OXYCODONE HYDROCHLORIDE 10 MILLIGRAM(S): 5 TABLET ORAL at 09:36

## 2019-03-05 RX ADMIN — SODIUM CHLORIDE 3 MILLILITER(S): 9 INJECTION INTRAMUSCULAR; INTRAVENOUS; SUBCUTANEOUS at 20:56

## 2019-03-05 RX ADMIN — ENOXAPARIN SODIUM 40 MILLIGRAM(S): 100 INJECTION SUBCUTANEOUS at 11:49

## 2019-03-06 VITALS
OXYGEN SATURATION: 99 % | TEMPERATURE: 97 F | RESPIRATION RATE: 18 BRPM | HEART RATE: 74 BPM | DIASTOLIC BLOOD PRESSURE: 76 MMHG | SYSTOLIC BLOOD PRESSURE: 112 MMHG

## 2019-03-06 PROCEDURE — 99238 HOSP IP/OBS DSCHRG MGMT 30/<: CPT | Mod: 24

## 2019-03-06 RX ORDER — ENOXAPARIN SODIUM 100 MG/ML
40 INJECTION SUBCUTANEOUS
Qty: 1120 | Refills: 0 | OUTPATIENT
Start: 2019-03-06 | End: 2019-04-02

## 2019-03-06 RX ADMIN — SODIUM CHLORIDE 3 MILLILITER(S): 9 INJECTION INTRAMUSCULAR; INTRAVENOUS; SUBCUTANEOUS at 05:40

## 2019-03-06 RX ADMIN — Medication 1 PACKET(S): at 11:39

## 2019-03-06 RX ADMIN — Medication 112 MICROGRAM(S): at 05:42

## 2019-03-06 RX ADMIN — PANTOPRAZOLE SODIUM 40 MILLIGRAM(S): 20 TABLET, DELAYED RELEASE ORAL at 05:43

## 2019-03-06 RX ADMIN — ENOXAPARIN SODIUM 40 MILLIGRAM(S): 100 INJECTION SUBCUTANEOUS at 11:39

## 2019-03-06 NOTE — PROGRESS NOTE ADULT - PROVIDER SPECIALTY LIST ADULT
Anesthesia
Pain Medicine
Surgery
Pain Medicine
Surgery
Surgery

## 2019-03-06 NOTE — PROGRESS NOTE ADULT - SUBJECTIVE AND OBJECTIVE BOX
Anesthesia Pain Management Service    SUBJECTIVE: Patient is doing well with IV PCA and no significant problems reported.    Pain Scale Score	At rest: ___ 	With Activity: ___ 	[X ] Refer to charted pain scores    THERAPY:    [ ] IV PCA Morphine		[ ] 5 mg/mL	[ ] 1 mg/mL  [X ] IV PCA Hydromorphone	[ ] 5 mg/mL	[X ] 1 mg/mL  [ ] IV PCA Fentanyl		[ ] 50 micrograms/mL    Demand dose __0.2_ lockout __6_ (minutes) Continuous Rate _0__ Total: _2.6__  mg used (in past 24 hours)      MEDICATIONS  (STANDING):  acetaminophen  IVPB .. 1000 milliGRAM(s) IV Intermittent once  acetaminophen  IVPB .. 1000 milliGRAM(s) IV Intermittent once  acetaminophen  IVPB .. 1000 milliGRAM(s) IV Intermittent once  dextrose 5% + sodium chloride 0.45% 1000 milliLiter(s) (125 mL/Hr) IV Continuous <Continuous>  dextrose 50% Injectable 12.5 Gram(s) IV Push once  heparin  Injectable 5000 Unit(s) SubCutaneous every 8 hours  HYDROmorphone PCA (1 mG/mL) 30 milliLiter(s) PCA Continuous PCA Continuous  insulin lispro (HumaLOG) corrective regimen sliding scale   SubCutaneous every 6 hours  ketorolac   Injectable 15 milliGRAM(s) IV Push every 6 hours  levothyroxine Injectable 56 MICROGram(s) IV Push <User Schedule>  pantoprazole  Injectable 40 milliGRAM(s) IV Push daily  sodium chloride 0.9% lock flush 3 milliLiter(s) IV Push every 8 hours    MEDICATIONS  (PRN):  diphenhydrAMINE   Injectable 25 milliGRAM(s) IV Push every 4 hours PRN Pruritus  HYDROmorphone PCA (1 mG/mL) Rescue Clinician Bolus 0.5 milliGRAM(s) IV Push every 15 minutes PRN for Pain Scale GREATER THAN 6  naloxone Injectable 0.1 milliGRAM(s) IV Push every 3 minutes PRN For ANY of the following changes in patient status:  A. RR LESS THAN 10 breaths per minute, B. Oxygen saturation LESS THAN 90%, C. Sedation score of 6  ondansetron Injectable 4 milliGRAM(s) IV Push every 6 hours PRN Nausea  tetracaine/benzocaine/butamben Spray 1 Spray(s) Topical every 3 hours PRN NGT tube      OBJECTIVE:    Sedation Score:	[ X] Alert	[ ] Drowsy 	[ ] Arousable	[ ] Asleep	[ ] Unresponsive    Side Effects:	[X ] None	[ ] Nausea	[ ] Vomiting	[ ] Pruritus  		[ ] Other:    Vital Signs Last 24 Hrs  T(C): 36.8 (01 Mar 2019 08:28), Max: 36.8 (28 Feb 2019 16:35)  T(F): 98.2 (01 Mar 2019 08:28), Max: 98.2 (28 Feb 2019 16:35)  HR: 87 (01 Mar 2019 08:28) (83 - 95)  BP: 124/77 (01 Mar 2019 08:28) (109/68 - 124/77)  BP(mean): --  RR: 16 (01 Mar 2019 08:28) (16 - 19)  SpO2: 100% (01 Mar 2019 08:28) (100% - 100%)    ASSESSMENT/ PLAN    Therapy to  be:	[ X] Continue   [ ] Discontinued   [ ] Change to prn Analgesics    Documentation and Verification of current medications:   [X] Done	[ ] Not done, not elligible    Comments: continue therapy.     Progress Note written now but Patient was seen earlier.
SURGERY DAILY PROGRESS NOTE:       SUBJECTIVE/ROS: Patient examined at bedside. No acute events overnight. NGT w/ 750 output.  NO GI fxn. OOB.          MEDICATIONS  (STANDING):  acetaminophen  IVPB .. 1000 milliGRAM(s) IV Intermittent once  acetaminophen  IVPB .. 1000 milliGRAM(s) IV Intermittent once  dextrose 5% + sodium chloride 0.45% 1000 milliLiter(s) (125 mL/Hr) IV Continuous <Continuous>  dextrose 50% Injectable 12.5 Gram(s) IV Push once  heparin  Injectable 5000 Unit(s) SubCutaneous every 8 hours  HYDROmorphone PCA (1 mG/mL) 30 milliLiter(s) PCA Continuous PCA Continuous  insulin lispro (HumaLOG) corrective regimen sliding scale   SubCutaneous every 6 hours  ketorolac   Injectable 15 milliGRAM(s) IV Push every 6 hours  levothyroxine Injectable 56 MICROGram(s) IV Push <User Schedule>  pantoprazole  Injectable 40 milliGRAM(s) IV Push daily  sodium chloride 0.9% lock flush 3 milliLiter(s) IV Push every 8 hours    MEDICATIONS  (PRN):  diphenhydrAMINE   Injectable 25 milliGRAM(s) IV Push every 4 hours PRN Pruritus  HYDROmorphone PCA (1 mG/mL) Rescue Clinician Bolus 0.5 milliGRAM(s) IV Push every 15 minutes PRN for Pain Scale GREATER THAN 6  naloxone Injectable 0.1 milliGRAM(s) IV Push every 3 minutes PRN For ANY of the following changes in patient status:  A. RR LESS THAN 10 breaths per minute, B. Oxygen saturation LESS THAN 90%, C. Sedation score of 6  ondansetron Injectable 4 milliGRAM(s) IV Push every 6 hours PRN Nausea  tetracaine/benzocaine/butamben Spray 1 Spray(s) Topical every 3 hours PRN NGT tube      OBJECTIVE:    Vital Signs Last 24 Hrs  T(C): 36.7 (02 Mar 2019 15:48), Max: 36.9 (02 Mar 2019 01:01)  T(F): 98 (02 Mar 2019 15:48), Max: 98.4 (02 Mar 2019 01:01)  HR: 82 (02 Mar 2019 15:48) (74 - 82)  BP: 107/64 (02 Mar 2019 15:48) (97/65 - 118/67)  BP(mean): --  RR: 18 (02 Mar 2019 15:48) (18 - 18)  SpO2: 100% (02 Mar 2019 15:48) (100% - 100%)        I&O's Detail    01 Mar 2019 07:01  -  02 Mar 2019 07:00  --------------------------------------------------------  IN:  Total IN: 0 mL    OUT:    Bulb: 150 mL    Ileostomy: 50 mL    Nasoenteral Tube: 750 mL    Voided: 2100 mL  Total OUT: 3050 mL    Total NET: -3050 mL      02 Mar 2019 07:01  -  02 Mar 2019 17:01  --------------------------------------------------------  IN:  Total IN: 0 mL    OUT:    Bulb: 150 mL    Ileostomy: 60 mL    Nasoenteral Tube: 300 mL    Voided: 750 mL  Total OUT: 1260 mL    Total NET: -1260 mL          Daily     Daily     LABS:                        9.5    13.81 )-----------( 705      ( 02 Mar 2019 07:12 )             29.5     03-02    135  |  102  |  5<L>  ----------------------------<  113<H>  4.1   |  25  |  0.58    Ca    8.4      02 Mar 2019 07:12  Phos  2.9     03-02  Mg     1.9     03-02        Objective:  NAD, awake and alert  Respirations nonlabored  Abdomen soft, nontender, nondistended, ostomy w/ some output, minimal air, NGT in place w/ bilious output  No guarding or rebound tenderness
ANESTHESIA POSTOP CHECK    58y Male POSTOP DAY 1 S/P     Vital Signs Last 24 Hrs  T(C): 37.1 (23 Feb 2019 04:58), Max: 37.1 (22 Feb 2019 21:35)  T(F): 98.7 (23 Feb 2019 04:58), Max: 98.7 (22 Feb 2019 21:35)  HR: 88 (23 Feb 2019 04:58) (81 - 106)  BP: 126/67 (23 Feb 2019 04:58) (118/68 - 142/83)  BP(mean): 82 (22 Feb 2019 20:30) (74 - 108)  RR: 18 (23 Feb 2019 04:58) (13 - 26)  SpO2: 100% (23 Feb 2019 04:58) (99% - 100%)  I&O's Summary    22 Feb 2019 07:01  -  23 Feb 2019 07:00  --------------------------------------------------------  IN: 800 mL / OUT: 995 mL / NET: -195 mL        [X ] NO APPARENT ANESTHESIA COMPLICATIONS      Comments:
Anesthesia Pain Management Service    SUBJECTIVE: Patient is doing well with IV PCA and no significant problems reported.    Pain Scale Score	At rest: _3/10__ 	With Activity: _6/10__ 	[X ] Refer to charted pain scores    THERAPY:    [ ] IV PCA Morphine		[ ] 5 mg/mL	[ ] 1 mg/mL  [X ] IV PCA Hydromorphone	[ ] 5 mg/mL	[X ] 1 mg/mL  [ ] IV PCA Fentanyl		[ ] 50 micrograms/mL    Demand dose __0.2_ lockout __6_ (minutes) Continuous Rate _0__ Total: _2.8__  mg used (in past 24 hours)      MEDICATIONS  (STANDING):  acetaminophen  IVPB .. 1000 milliGRAM(s) IV Intermittent once  dextrose 5% + sodium chloride 0.45% 1000 milliLiter(s) (125 mL/Hr) IV Continuous <Continuous>  dextrose 50% Injectable 12.5 Gram(s) IV Push once  heparin  Injectable 5000 Unit(s) SubCutaneous every 8 hours  HYDROmorphone PCA (1 mG/mL) 30 milliLiter(s) PCA Continuous PCA Continuous  insulin lispro (HumaLOG) corrective regimen sliding scale   SubCutaneous every 6 hours  levothyroxine Injectable 56 MICROGram(s) IV Push <User Schedule>  pantoprazole  Injectable 40 milliGRAM(s) IV Push daily  sodium chloride 0.9% lock flush 3 milliLiter(s) IV Push every 8 hours    MEDICATIONS  (PRN):  diphenhydrAMINE   Injectable 25 milliGRAM(s) IV Push every 4 hours PRN Pruritus  HYDROmorphone PCA (1 mG/mL) Rescue Clinician Bolus 0.5 milliGRAM(s) IV Push every 15 minutes PRN for Pain Scale GREATER THAN 6  naloxone Injectable 0.1 milliGRAM(s) IV Push every 3 minutes PRN For ANY of the following changes in patient status:  A. RR LESS THAN 10 breaths per minute, B. Oxygen saturation LESS THAN 90%, C. Sedation score of 6  ondansetron Injectable 4 milliGRAM(s) IV Push every 6 hours PRN Nausea  tetracaine/benzocaine/butamben Spray 1 Spray(s) Topical every 3 hours PRN NGT tube      OBJECTIVE:  Patient is NPO, with NGT to LCWS lying in bed, comfortable.    Sedation Score:	[ X] Alert	[ ] Drowsy 	[ ] Arousable	[ ] Asleep	[ ] Unresponsive    Side Effects:	[X ] None	[ ] Nausea	[ ] Vomiting	[ ] Pruritus  		[ ] Other:    Vital Signs Last 24 Hrs  T(C): 36.8 (28 Feb 2019 07:57), Max: 36.8 (27 Feb 2019 15:51)  T(F): 98.2 (28 Feb 2019 07:57), Max: 98.2 (27 Feb 2019 15:51)  HR: 86 (28 Feb 2019 08:26) (78 - 86)  BP: 110/64 (28 Feb 2019 08:26) (109/66 - 124/68)  BP(mean): --  RR: 19 (28 Feb 2019 08:26) (17 - 19)  SpO2: 100% (28 Feb 2019 08:26) (99% - 100%)    ASSESSMENT/ PLAN    Therapy to  be:	[ X] Continue   [ ] Discontinued   [ ] Change to prn Analgesics    Documentation and Verification of current medications:   [X] Done	[ ] Not done, not elligible    Comments:  Continue current pain regimen.
Anesthesia Pain Management Service    SUBJECTIVE: Patient is doing well with IV PCA and no significant problems reported.    Pain Scale Score	At rest: ___ 	With Activity: ___ 	[X ] Refer to charted pain scores    THERAPY:    [ ] IV PCA Morphine		[ ] 5 mg/mL	[ ] 1 mg/mL  [X ] IV PCA Hydromorphone	[ ] 5 mg/mL	[X ] 1 mg/mL  [ ] IV PCA Fentanyl		[ ] 50 micrograms/mL    Demand dose __0.2_ lockout __6_ (minutes) Continuous Rate _0__ Total: 2_  mg used (in past 24 hours)      MEDICATIONS  (STANDING):  acetaminophen  IVPB .. 1000 milliGRAM(s) IV Intermittent once  dextrose 5% + sodium chloride 0.45% 1000 milliLiter(s) (125 mL/Hr) IV Continuous <Continuous>  dextrose 50% Injectable 12.5 Gram(s) IV Push once  heparin  Injectable 5000 Unit(s) SubCutaneous every 8 hours  HYDROmorphone PCA (1 mG/mL) 30 milliLiter(s) PCA Continuous PCA Continuous  insulin lispro (HumaLOG) corrective regimen sliding scale   SubCutaneous every 6 hours  levothyroxine Injectable 56 MICROGram(s) IV Push <User Schedule>  pantoprazole  Injectable 40 milliGRAM(s) IV Push daily  sodium chloride 0.9% lock flush 3 milliLiter(s) IV Push every 8 hours    MEDICATIONS  (PRN):  diphenhydrAMINE   Injectable 25 milliGRAM(s) IV Push every 4 hours PRN Pruritus  HYDROmorphone PCA (1 mG/mL) Rescue Clinician Bolus 0.5 milliGRAM(s) IV Push every 15 minutes PRN for Pain Scale GREATER THAN 6  naloxone Injectable 0.1 milliGRAM(s) IV Push every 3 minutes PRN For ANY of the following changes in patient status:  A. RR LESS THAN 10 breaths per minute, B. Oxygen saturation LESS THAN 90%, C. Sedation score of 6  ondansetron Injectable 4 milliGRAM(s) IV Push every 6 hours PRN Nausea  tetracaine/benzocaine/butamben Spray 1 Spray(s) Topical every 3 hours PRN NGT tube      OBJECTIVE:    Sedation Score:	[ X] Alert	[ ] Drowsy 	[ ] Arousable	[ ] Asleep	[ ] Unresponsive    Side Effects:	[X ] None	[ ] Nausea	[ ] Vomiting	[ ] Pruritus  		[ ] Other:    Vital Signs Last 24 Hrs  T(C): 37.1 (03 Mar 2019 08:25), Max: 37.3 (02 Mar 2019 19:47)  T(F): 98.8 (03 Mar 2019 08:25), Max: 99.2 (02 Mar 2019 19:47)  HR: 70 (03 Mar 2019 08:25) (67 - 82)  BP: 107/60 (03 Mar 2019 08:25) (101/60 - 112/66)  BP(mean): --  RR: 18 (03 Mar 2019 08:25) (18 - 18)  SpO2: 98% (03 Mar 2019 08:25) (98% - 100%)    ASSESSMENT/ PLAN    Therapy to  be:	[ X] Continue   [ ] Discontinued   [ ] Change to prn Analgesics    Documentation and Verification of current medications:   [X] Done	[ ] Not done, not elligible    Comments: on clear diet now. D/C IV PCA tomorrow & pt agrees.    Progress Note written now but Patient was seen earlier.
Anesthesia Pain Management Service    SUBJECTIVE: Patient is doing well with IV PCA and no significant problems reported.    Pain Scale Score	At rest: ___ 	With Activity: ___ 	[X ] Refer to charted pain scores    THERAPY:    [ ] IV PCA Morphine		[ ] 5 mg/mL	[ ] 1 mg/mL  [X ] IV PCA Hydromorphone	[ ] 5 mg/mL	[X ] 1 mg/mL  [ ] IV PCA Fentanyl		[ ] 50 micrograms/mL    Demand dose __0.2_ lockout __6_ (minutes) Continuous Rate _0__ Total: _3.2_  mg used (in past 24 hours)      MEDICATIONS  (STANDING):  acetaminophen  IVPB .. 1000 milliGRAM(s) IV Intermittent once  acetaminophen  IVPB .. 1000 milliGRAM(s) IV Intermittent once  dextrose 5% + sodium chloride 0.45% 1000 milliLiter(s) (125 mL/Hr) IV Continuous <Continuous>  dextrose 50% Injectable 12.5 Gram(s) IV Push once  heparin  Injectable 5000 Unit(s) SubCutaneous every 8 hours  HYDROmorphone PCA (1 mG/mL) 30 milliLiter(s) PCA Continuous PCA Continuous  insulin lispro (HumaLOG) corrective regimen sliding scale   SubCutaneous every 6 hours  ketorolac   Injectable 15 milliGRAM(s) IV Push every 6 hours  levothyroxine Injectable 56 MICROGram(s) IV Push <User Schedule>  magnesium sulfate  IVPB 1 Gram(s) IV Intermittent once  pantoprazole  Injectable 40 milliGRAM(s) IV Push daily  sodium chloride 0.9% lock flush 3 milliLiter(s) IV Push every 8 hours  sodium phosphate IVPB 15 milliMole(s) IV Intermittent once    MEDICATIONS  (PRN):  diphenhydrAMINE   Injectable 25 milliGRAM(s) IV Push every 4 hours PRN Pruritus  HYDROmorphone PCA (1 mG/mL) Rescue Clinician Bolus 0.5 milliGRAM(s) IV Push every 15 minutes PRN for Pain Scale GREATER THAN 6  naloxone Injectable 0.1 milliGRAM(s) IV Push every 3 minutes PRN For ANY of the following changes in patient status:  A. RR LESS THAN 10 breaths per minute, B. Oxygen saturation LESS THAN 90%, C. Sedation score of 6  ondansetron Injectable 4 milliGRAM(s) IV Push every 6 hours PRN Nausea  tetracaine/benzocaine/butamben Spray 1 Spray(s) Topical every 3 hours PRN NGT tube      OBJECTIVE:    Sedation Score:	[ X] Alert	[ ] Drowsy 	[ ] Arousable	[ ] Asleep	[ ] Unresponsive    Side Effects:	[X ] None	[ ] Nausea	[ ] Vomiting	[ ] Pruritus  		[ ] Other:    Vital Signs Last 24 Hrs  T(C): 36.4 (02 Mar 2019 11:20), Max: 36.9 (02 Mar 2019 01:01)  T(F): 97.6 (02 Mar 2019 11:20), Max: 98.4 (02 Mar 2019 01:01)  HR: 74 (02 Mar 2019 11:20) (74 - 82)  BP: 97/65 (02 Mar 2019 11:20) (97/65 - 118/67)  BP(mean): --  RR: 18 (02 Mar 2019 11:20) (17 - 18)  SpO2: 100% (02 Mar 2019 11:20) (100% - 100%)    ASSESSMENT/ PLAN    Therapy to  be:	[ X] Continue   [ ] Discontinued   [ ] Change to prn Analgesics    Documentation and Verification of current medications:   [X] Done	[ ] Not done, not elligible    Comments: NGT. Pt using minimal opioid as needed and aware of side effects.    Progress Note written now but Patient was seen earlier.
Anesthesia Pain Management Service    SUBJECTIVE: Patient is doing well with IV PCA and no significant problems reported. IV PCA d/c'ed by team.     Pain Scale Score	At rest: ___ 	With Activity: ___ 	[X ] Refer to charted pain scores    THERAPY:    [ ] IV PCA Morphine		[ ] 5 mg/mL	[ ] 1 mg/mL  [X ] IV PCA Hydromorphone	[ ] 5 mg/mL	[X ] 1 mg/mL  [ ] IV PCA Fentanyl		[ ] 50 micrograms/mL    Demand dose __0.2_ lockout __6_ (minutes) Continuous Rate _0__ Total: _1.6__   mg used (in past 24 hrs)      MEDICATIONS  (STANDING):  dextrose 50% Injectable 12.5 Gram(s) IV Push once  heparin  Injectable 5000 Unit(s) SubCutaneous every 8 hours  insulin lispro (HumaLOG) corrective regimen sliding scale   SubCutaneous every 6 hours  levothyroxine 112 MICROGram(s) Oral daily  pantoprazole    Tablet 40 milliGRAM(s) Oral before breakfast  psyllium Powder 1 Packet(s) Oral daily  sodium chloride 0.9% lock flush 3 milliLiter(s) IV Push every 8 hours    MEDICATIONS  (PRN):  acetaminophen   Tablet .. 650 milliGRAM(s) Oral every 6 hours PRN Mild Pain (1 - 3)  oxyCODONE    IR 5 milliGRAM(s) Oral every 4 hours PRN Moderate Pain (4 - 6)  oxyCODONE    IR 10 milliGRAM(s) Oral every 6 hours PRN Severe Pain (7 - 10)      OBJECTIVE:    Sedation Score:	[ X] Alert	[ ] Drowsy 	[ ] Arousable	[ ] Asleep	[ ] Unresponsive    Side Effects:	[X ] None	[ ] Nausea	[ ] Vomiting	[ ] Pruritus  		[ ] Other:    Vital Signs Last 24 Hrs  T(C): 36.7 (04 Mar 2019 07:59), Max: 37.1 (03 Mar 2019 12:30)  T(F): 98 (04 Mar 2019 07:59), Max: 98.7 (03 Mar 2019 12:30)  HR: 75 (04 Mar 2019 07:59) (75 - 90)  BP: 99/62 (04 Mar 2019 07:59) (99/62 - 113/80)  BP(mean): --  RR: 18 (04 Mar 2019 07:59) (18 - 18)  SpO2: 98% (04 Mar 2019 07:59) (98% - 100%)    ASSESSMENT/ PLAN    Therapy to  be:	[ ] Continue   [ X] Discontinued   [X ] Change to prn Analgesics    Documentation and Verification of current medications:   [X] Done	[ ] Not done, not elligible    Comments: PRN Oral/IV opioids and/or Adjuvant non-opioid medication to be ordered at this point.    Progress Note written now but Patient was seen earlier.
Anesthesia Pain Management Service    SUBJECTIVE: Patient is doing well with IV PCA. Vomiting overnight requiring NGT.     Pain Scale Score	At rest: _4__ 	With Activity: ___ 	[X ] Refer to charted pain scores    THERAPY:    [ ] IV PCA Morphine		[ ] 5 mg/mL	[ ] 1 mg/mL  [X ] IV PCA Hydromorphone	[ ] 5 mg/mL	[X ] 1 mg/mL  [ ] IV PCA Fentanyl		[ ] 50 micrograms/mL    Demand dose __0.2_ lockout __6_ (minutes) Continuous Rate _0__ Total: _1.3__  mg used (in past 24 hours)      MEDICATIONS  (STANDING):  acetaminophen  IVPB .. 1000 milliGRAM(s) IV Intermittent once  dextrose 5% + sodium chloride 0.45% 1000 milliLiter(s) (125 mL/Hr) IV Continuous <Continuous>  dextrose 50% Injectable 12.5 Gram(s) IV Push once  heparin  Injectable 5000 Unit(s) SubCutaneous every 8 hours  HYDROmorphone PCA (1 mG/mL) 30 milliLiter(s) PCA Continuous PCA Continuous  insulin lispro (HumaLOG) corrective regimen sliding scale   SubCutaneous every 6 hours  levothyroxine Injectable 56 MICROGram(s) IV Push <User Schedule>  pantoprazole  Injectable 40 milliGRAM(s) IV Push daily  sodium chloride 0.9% lock flush 3 milliLiter(s) IV Push every 8 hours  sodium phosphate IVPB 15 milliMole(s) IV Intermittent once    MEDICATIONS  (PRN):  diphenhydrAMINE   Injectable 25 milliGRAM(s) IV Push every 4 hours PRN Pruritus  HYDROmorphone PCA (1 mG/mL) Rescue Clinician Bolus 0.5 milliGRAM(s) IV Push every 15 minutes PRN for Pain Scale GREATER THAN 6  naloxone Injectable 0.1 milliGRAM(s) IV Push every 3 minutes PRN For ANY of the following changes in patient status:  A. RR LESS THAN 10 breaths per minute, B. Oxygen saturation LESS THAN 90%, C. Sedation score of 6  ondansetron Injectable 4 milliGRAM(s) IV Push every 6 hours PRN Nausea  tetracaine/benzocaine/butamben Spray 1 Spray(s) Topical every 3 hours PRN NGT tube      OBJECTIVE:    Sedation Score:	[ X] Alert	[ ] Drowsy 	[ ] Arousable	[ ] Asleep	[ ] Unresponsive    Side Effects:	[X ] None	[ ] Nausea	[ ] Vomiting	[ ] Pruritus  		[ ] Other:    Vital Signs Last 24 Hrs  T(C): 36.3 (27 Feb 2019 08:37), Max: 36.7 (26 Feb 2019 13:26)  T(F): 97.3 (27 Feb 2019 08:37), Max: 98 (26 Feb 2019 13:26)  HR: 82 (27 Feb 2019 08:37) (80 - 97)  BP: 121/75 (27 Feb 2019 08:37) (105/68 - 129/83)  BP(mean): --  RR: 16 (27 Feb 2019 08:37) (16 - 18)  SpO2: 99% (27 Feb 2019 08:37) (99% - 100%)    ASSESSMENT/ PLAN    Therapy to  be:	[ X] Continue   [ ] Discontinued   [ ] Change to prn Analgesics    Documentation and Verification of current medications:   [X] Done	[ ] Not done, not elligible    Comments:    Progress Note written now but Patient was seen earlier.
Anesthesia Pain Management Service    SUBJECTIVE: Pt doing well with IV PCA without problems reported.    Therapy:	  [ X] IV PCA	   [ ] Epidural           [ ] s/p Spinal Opoid              [ ] Postpartum infusion	  [ ] Patient controlled regional anesthesia (PCRA)    [ ] prn Analgesics    Allergies    No Known Allergies    Intolerances      MEDICATIONS  (STANDING):  acetaminophen  IVPB .. 1000 milliGRAM(s) IV Intermittent once  dextrose 5% + sodium chloride 0.45% 1000 milliLiter(s) (125 mL/Hr) IV Continuous <Continuous>  dextrose 50% Injectable 12.5 Gram(s) IV Push once  heparin  Injectable 5000 Unit(s) SubCutaneous every 8 hours  HYDROmorphone PCA (1 mG/mL) 30 milliLiter(s) PCA Continuous PCA Continuous  insulin lispro (HumaLOG) corrective regimen sliding scale   SubCutaneous every 6 hours  levothyroxine Injectable 56 MICROGram(s) IV Push <User Schedule>  pantoprazole  Injectable 40 milliGRAM(s) IV Push daily  sodium chloride 0.9% lock flush 3 milliLiter(s) IV Push every 8 hours    MEDICATIONS  (PRN):  diphenhydrAMINE   Injectable 25 milliGRAM(s) IV Push every 4 hours PRN Pruritus  HYDROmorphone PCA (1 mG/mL) Rescue Clinician Bolus 0.5 milliGRAM(s) IV Push every 15 minutes PRN for Pain Scale GREATER THAN 6  naloxone Injectable 0.1 milliGRAM(s) IV Push every 3 minutes PRN For ANY of the following changes in patient status:  A. RR LESS THAN 10 breaths per minute, B. Oxygen saturation LESS THAN 90%, C. Sedation score of 6  ondansetron Injectable 4 milliGRAM(s) IV Push every 6 hours PRN Nausea  tetracaine/benzocaine/butamben Spray 1 Spray(s) Topical every 3 hours PRN NGT tube      OBJECTIVE:   [X] No new signs     [ ] Other:    Side Effects:  [X ] None			[ ] Other:    Assessment of Catheter Site:		[ ] Intact		[ ] Other:    ASSESSMENT/PLAN  [ X] Continue current therapy    [ ] Therapy changed to:    [ ] IV PCA       [ ] Epidural     [ ] prn Analgesics     Comments:
Anesthesia Pain Management Service    SUBJECTIVE: Pt doing well with PCEA removed & no problems reported.    Therapy:	  [ ] IV PCA	   [ X] Epidural stopped          [ ] s/p Spinal Opoid              [ ] Postpartum infusion	  [ ] Patient controlled regional anesthesia (PCRA)    [ ] prn Analgesics    Allergies    No Known Allergies    Intolerances      MEDICATIONS  (STANDING):  acetaminophen  IVPB .. 1000 milliGRAM(s) IV Intermittent once  dextrose 5% + sodium chloride 0.45% 1000 milliLiter(s) (125 mL/Hr) IV Continuous <Continuous>  dextrose 50% Injectable 12.5 Gram(s) IV Push once  heparin  Injectable 5000 Unit(s) SubCutaneous every 8 hours  HYDROmorphone PCA (1 mG/mL) 30 milliLiter(s) PCA Continuous PCA Continuous  insulin lispro (HumaLOG) corrective regimen sliding scale   SubCutaneous every 6 hours  levothyroxine Injectable 56 MICROGram(s) IV Push <User Schedule>  sodium chloride 0.9% lock flush 3 milliLiter(s) IV Push every 8 hours    MEDICATIONS  (PRN):  diphenhydrAMINE   Injectable 25 milliGRAM(s) IV Push every 4 hours PRN Pruritus  HYDROmorphone PCA (1 mG/mL) Rescue Clinician Bolus 0.5 milliGRAM(s) IV Push every 15 minutes PRN for Pain Scale GREATER THAN 6  naloxone Injectable 0.1 milliGRAM(s) IV Push every 3 minutes PRN For ANY of the following changes in patient status:  A. RR LESS THAN 10 breaths per minute, B. Oxygen saturation LESS THAN 90%, C. Sedation score of 6  ondansetron Injectable 4 milliGRAM(s) IV Push every 6 hours PRN Nausea      OBJECTIVE:   [X] No new signs     [ ] Other:    Side Effects:  [X ] None			[ ] Other:    Assessment of Catheter Site:		[ ] Intact		[ ] Other:    ASSESSMENT/PLAN  [ ] Continue current therapy    [X ] Therapy changed to:    [ ] IV PCA       [ ] Epidural     [ X] prn Analgesics     Comments: Epidural stopped & now to go on oral/IV opioids and/or non-opioid Adjuvant analgesics to be used at this point.     Progress Note written now but Patient was seen earlier.
Anesthesia Pain Management Service    SUBJECTIVE: Pt doing well with PCEA without problems reported.    Therapy:	  [ ] IV PCA	   [ X] Epidural           [ ] s/p Spinal Opoid              [ ] Postpartum infusion	  [ ] Patient controlled regional anesthesia (PCRA)    [ ] prn Analgesics    Allergies    No Known Allergies    Intolerances      MEDICATIONS  (STANDING):  acetaminophen  IVPB .. 1000 milliGRAM(s) IV Intermittent once  dextrose 5% + sodium chloride 0.45%. 1000 milliLiter(s) (125 mL/Hr) IV Continuous <Continuous>  dextrose 50% Injectable 12.5 Gram(s) IV Push once  heparin  Injectable 5000 Unit(s) SubCutaneous every 12 hours  HYDROmorphone (10 MICROgram(s)/mL) + BUpivacaine 0.0625% in 0.9% Sodium Chloride PCEA 250 milliLiter(s) Epidural PCA Continuous  insulin lispro (HumaLOG) corrective regimen sliding scale   SubCutaneous every 6 hours  levothyroxine Injectable 56 MICROGram(s) IV Push <User Schedule>  sodium chloride 0.9% lock flush 3 milliLiter(s) IV Push every 8 hours    MEDICATIONS  (PRN):  diphenhydrAMINE   Injectable 25 milliGRAM(s) IV Push every 4 hours PRN Pruritus  HYDROmorphone (10 MICROgram(s)/mL) + BUpivacaine 0.0625% in 0.9% Sodium Chloride PCEA Rescue Clinician  Bolus 5 milliLiter(s) Epidural every 15 minutes PRN for Pain Score greater than 6  naloxone Injectable 0.1 milliGRAM(s) IV Push every 3 minutes PRN For ANY of the following changes in patient status:  A. RR LESS THAN 10 breaths per minute, B. Oxygen saturation LESS THAN 90%, C. Sedation score of 6  ondansetron Injectable 4 milliGRAM(s) IV Push every 6 hours PRN Nausea      OBJECTIVE:   [X] No new signs     [ ] Other:    Side Effects:  [X ] None			[ ] Other:    Assessment of Catheter Site:		[ X] Intact		[ ] Other:    ASSESSMENT/PLAN  [ X] Continue current therapy    [ ] Therapy changed to:    [ ] IV PCA       [ ] Epidural     [ ] prn Analgesics     Comments: Continue current PCEA settings.    Progress Note written now but Patient was seen earlier.
Anesthesia Pain Management Service- Attending Addendum    SUBJECTIVE: Pt doing well with IV PCA without problems reported.    Therapy:	  [ X] IV PCA	   [ ] Epidural           [ ] s/p Spinal Opoid              [ ] Postpartum infusion	  [ ] Patient controlled regional anesthesia (PCRA)    [ ] prn Analgesics    Allergies    No Known Allergies    Intolerances      MEDICATIONS  (STANDING):  acetaminophen  IVPB .. 1000 milliGRAM(s) IV Intermittent once  dextrose 5% + sodium chloride 0.45% 1000 milliLiter(s) (125 mL/Hr) IV Continuous <Continuous>  dextrose 50% Injectable 12.5 Gram(s) IV Push once  heparin  Injectable 5000 Unit(s) SubCutaneous every 8 hours  HYDROmorphone PCA (1 mG/mL) 30 milliLiter(s) PCA Continuous PCA Continuous  insulin lispro (HumaLOG) corrective regimen sliding scale   SubCutaneous every 6 hours  levothyroxine Injectable 56 MICROGram(s) IV Push <User Schedule>  pantoprazole  Injectable 40 milliGRAM(s) IV Push daily  sodium chloride 0.9% lock flush 3 milliLiter(s) IV Push every 8 hours    MEDICATIONS  (PRN):  diphenhydrAMINE   Injectable 25 milliGRAM(s) IV Push every 4 hours PRN Pruritus  HYDROmorphone PCA (1 mG/mL) Rescue Clinician Bolus 0.5 milliGRAM(s) IV Push every 15 minutes PRN for Pain Scale GREATER THAN 6  naloxone Injectable 0.1 milliGRAM(s) IV Push every 3 minutes PRN For ANY of the following changes in patient status:  A. RR LESS THAN 10 breaths per minute, B. Oxygen saturation LESS THAN 90%, C. Sedation score of 6  ondansetron Injectable 4 milliGRAM(s) IV Push every 6 hours PRN Nausea  tetracaine/benzocaine/butamben Spray 1 Spray(s) Topical every 3 hours PRN NGT tube      OBJECTIVE:   [X] No new signs     [ ] Other:    Side Effects:  [X ] None			[ ] Other:    Assessment of Catheter Site:		[ ] Intact		[ ] Other:    ASSESSMENT/PLAN  [ X] Continue current therapy    [ ] Therapy changed to:    [ ] IV PCA       [ ] Epidural     [ ] prn Analgesics     Comments:
Anesthesia Pain Management Service: Day 4__ of Epidural    SUBJECTIVE: Patient doing well with PCEA and no problems.  Patient complains of pain with movement.  Pain Scale Score: 4/10 resting, 6-7/10 with activity  Refer to charted pain scores    THERAPY:  [x ] Epidural Bupivacaine 0.0625% and Hydromorphone  		[ X] 10 micrograms/mL	[ ] 5 micrograms/mL  [ ] Epidural Bupivacaine 0.0625% and Fentanyl - 2 micrograms/mL  [ ] Epidural Ropivacaine 0.1% plain – 1 mg/mL  [ ] Patient Controlled Regional Anesthesia (PCRA) Ropivacaine  		[ ] 0.2%			[ ] 0.1%    Demand dose __3_ lockout __15_ (minutes) Continuous Rate _4__ Total: ___115.8_ ml used (in past 24 hours)      MEDICATIONS  (STANDING):  acetaminophen  IVPB .. 1000 milliGRAM(s) IV Intermittent once  dextrose 5% + sodium chloride 0.45%. 1000 milliLiter(s) (125 mL/Hr) IV Continuous <Continuous>  dextrose 50% Injectable 12.5 Gram(s) IV Push once  heparin  Injectable 5000 Unit(s) SubCutaneous every 12 hours  HYDROmorphone (10 MICROgram(s)/mL) + BUpivacaine 0.0625% in 0.9% Sodium Chloride PCEA 250 milliLiter(s) Epidural PCA Continuous  insulin lispro (HumaLOG) corrective regimen sliding scale   SubCutaneous every 6 hours  levothyroxine Injectable 56 MICROGram(s) IV Push at bedtime  magnesium sulfate  IVPB 2 Gram(s) IV Intermittent once  potassium chloride  10 mEq/100 mL IVPB 10 milliEquivalent(s) IV Intermittent once  potassium phosphate IVPB 15 milliMole(s) IV Intermittent once  sodium chloride 0.9% lock flush 3 milliLiter(s) IV Push every 8 hours    MEDICATIONS  (PRN):  diphenhydrAMINE   Injectable 25 milliGRAM(s) IV Push every 4 hours PRN Pruritus  HYDROmorphone (10 MICROgram(s)/mL) + BUpivacaine 0.0625% in 0.9% Sodium Chloride PCEA Rescue Clinician  Bolus 5 milliLiter(s) Epidural every 15 minutes PRN for Pain Score greater than 6  naloxone Injectable 0.1 milliGRAM(s) IV Push every 3 minutes PRN For ANY of the following changes in patient status:  A. RR LESS THAN 10 breaths per minute, B. Oxygen saturation LESS THAN 90%, C. Sedation score of 6  ondansetron Injectable 4 milliGRAM(s) IV Push every 6 hours PRN Nausea      OBJECTIVE:  Patient is lying in bed, NPO with NGT to LCWS.  SIgn above bed, yellow tubing in place, epidural site CDI and taped, connections secured.    Assessment of Catheter Site:	[ ] Left	[ ] Right  [x ] Epidural 	[ ] Femoral	      [ ] Saphenous   [ ] Supraclavicular   [ ] Other:    [x ] Dressing intact	[x ] Site non-tender	[ x] Site without erythema, discharge, edema  [x ] Epidural tubing and connection checked	[x] Gross neurological exam within normal limits  [ ] Catheter removed – tip intact		[ ] Afebrile  	[ ] Febrile: ___   [ X] see Temp under VS below)    PT/INR - ( 25 Feb 2019 06:00 )   PT: 13.5 SEC;   INR: 1.21          PTT - ( 25 Feb 2019 06:00 )  PTT:30.1 SEC                      9.0    14.08 )-----------( 379      ( 25 Feb 2019 06:00 )             28.9     Vital Signs Last 24 Hrs  T(C): 36.9 (02-25-19 @ 08:09), Max: 36.9 (02-25-19 @ 05:13)  T(F): 98.4 (02-25-19 @ 08:09), Max: 98.5 (02-25-19 @ 05:13)  HR: 88 (02-25-19 @ 08:09) (75 - 88)  BP: 122/79 (02-25-19 @ 08:09) (105/63 - 122/79)  BP(mean): --  RR: 18 (02-25-19 @ 08:09) (17 - 18)  SpO2: 100% (02-25-19 @ 08:09) (97% - 100%)      Sedation Score:	[x ] Alert	[ ] Drowsy	[ ] Arousable	[ ] Asleep	[ ] Unresponsive    Side Effects:	[x ] None	[ ] Nausea	[ ] Vomiting	[ ] Pruritus  		[ ] Weakness		[ ] Numbness	[ ] Other:    ASSESSMENT/ PLAN:    Therapy to  be:	[x ] Continue   [ ] Discontinued   [ ] Change to prn Analgesics    Documentation and Verification of current medications:  [ X ] Done	[ ] Not done, not eligible, reason:    Comments: Continue PCEA. Increased continuous rate to 6 ml/hr.  Possibly, discontinue PCEA tomorrow and transition to IV PCA if patient still NPO with NGT to LCWS.
Anesthesia Pain Management Service: Day _5_ of Epidural    SUBJECTIVE: Patient doing well with PCEA and no problems.  Pain Scale Score:   Refer to charted pain scores    THERAPY:  [x ] Epidural Bupivacaine 0.0625% and Hydromorphone  		[X ] 10 micrograms/mL	[ ] 5 micrograms/mL  [ ] Epidural Bupivacaine 0.0625% and Fentanyl - 2 micrograms/mL  [ ] Epidural Ropivacaine 0.1% plain – 1 mg/mL  [ ] Patient Controlled Regional Anesthesia (PCRA) Ropivacaine  		[ ] 0.2%			[ ] 0.1%    Demand dose __3_ lockout __15_ (minutes) Continuous Rate _6__ Total: _183.7ml__ Daily      MEDICATIONS  (STANDING):  acetaminophen  IVPB .. 1000 milliGRAM(s) IV Intermittent once  dextrose 5% + sodium chloride 0.45% 1000 milliLiter(s) (125 mL/Hr) IV Continuous <Continuous>  dextrose 50% Injectable 12.5 Gram(s) IV Push once  heparin  Injectable 5000 Unit(s) SubCutaneous every 8 hours  HYDROmorphone PCA (1 mG/mL) 30 milliLiter(s) PCA Continuous PCA Continuous  insulin lispro (HumaLOG) corrective regimen sliding scale   SubCutaneous every 6 hours  levothyroxine Injectable 56 MICROGram(s) IV Push <User Schedule>  potassium chloride  10 mEq/100 mL IVPB 10 milliEquivalent(s) IV Intermittent every 1 hour  sodium chloride 0.9% lock flush 3 milliLiter(s) IV Push every 8 hours    MEDICATIONS  (PRN):  diphenhydrAMINE   Injectable 25 milliGRAM(s) IV Push every 4 hours PRN Pruritus  HYDROmorphone (10 MICROgram(s)/mL) + BUpivacaine 0.0625% in 0.9% Sodium Chloride PCEA Rescue Clinician  Bolus 5 milliLiter(s) Epidural every 15 minutes PRN for Pain Score greater than 6  HYDROmorphone PCA (1 mG/mL) Rescue Clinician Bolus 0.5 milliGRAM(s) IV Push every 15 minutes PRN for Pain Scale GREATER THAN 6  naloxone Injectable 0.1 milliGRAM(s) IV Push every 3 minutes PRN For ANY of the following changes in patient status:  A. RR LESS THAN 10 breaths per minute, B. Oxygen saturation LESS THAN 90%, C. Sedation score of 6  ondansetron Injectable 4 milliGRAM(s) IV Push every 6 hours PRN Nausea      OBJECTIVE: sitting in chair     Assessment of Catheter Site:	[ ] Left	[ ] Right  [x ] Epidural 	[ ] Femoral	      [ ] Saphenous   [ ] Supraclavicular   [ ] Other:    [x ] Dressing intact	[x ] Site non-tender	[ x] Site without erythema, discharge, edema  [x ] Epidural tubing and connection checked	[x] Gross neurological exam within normal limits  [X ] Catheter removed – tip intact		[ ] Afebrile	  [ ] Febrile: ___       [ X] see Temp under VS below)    PT/INR - ( 26 Feb 2019 09:04 )   PT: 13.7 SEC;   INR: 1.20          PTT - ( 26 Feb 2019 09:04 )  PTT:29.4 SEC                      9.7    11.08 )-----------( 445      ( 26 Feb 2019 09:04 )             31.0     Vital Signs Last 24 Hrs  T(C): 36.8 (02-26-19 @ 07:58), Max: 36.8 (02-26-19 @ 07:58)  T(F): 98.2 (02-26-19 @ 07:58), Max: 98.2 (02-26-19 @ 07:58)  HR: 86 (02-26-19 @ 07:58) (76 - 90)  BP: 103/64 (02-26-19 @ 07:58) (97/62 - 114/66)  BP(mean): --  RR: 16 (02-26-19 @ 07:58) (16 - 18)  SpO2: 99% (02-26-19 @ 07:58) (99% - 100%)      Sedation Score:	[x ] Alert	[ ] Drowsy	[ ] Arousable	[ ] Asleep	[ ] Unresponsive    Side Effects:	[x ] None	[ ] Nausea	[ ] Vomiting	[ ] Pruritus  		[ ] Weakness		[ ] Numbness	[ ] Other:    ASSESSMENT/ PLAN:    Therapy to  be:	[ ] Continue   [ X] Discontinued   [ X] Change to prn Analgesics    Documentation and Verification of current medications:  [ X ] Done	[ ] Not done, not eligible, reason:    Comments: Changed to IVPCA. NPO
Anesthesia Pain Management Service: Day __ of Epidural    SUBJECTIVE: Patient doing well with PCEA and no problems.  Pain Scale Score:   Refer to charted pain scores    THERAPY:  [x ] Epidural Bupivacaine 0.0625% and Hydromorphone  		[ X] 10 micrograms/mL	[ ] 5 micrograms/mL  [ ] Epidural Bupivacaine 0.0625% and Fentanyl - 2 micrograms/mL  [ ] Epidural Ropivacaine 0.1% plain – 1 mg/mL  [ ] Patient Controlled Regional Anesthesia (PCRA) Ropivacaine  		[ ] 0.2%			[ ] 0.1%    Demand dose __3_ lockout __15_ (minutes) Continuous Rate __4_ Total: __118.5__ ml used (in past 24 hours)      MEDICATIONS  (STANDING):  cefoTEtan  IVPB 2 Gram(s) IV Intermittent every 12 hours  dextrose 50% Injectable 12.5 Gram(s) IV Push once  heparin  Injectable 5000 Unit(s) SubCutaneous every 12 hours  HYDROmorphone (10 MICROgram(s)/mL) + BUpivacaine 0.0625% in 0.9% Sodium Chloride PCEA 250 milliLiter(s) Epidural PCA Continuous  insulin lispro (HumaLOG) corrective regimen sliding scale   SubCutaneous every 6 hours  lactated ringers. 1000 milliLiter(s) (125 mL/Hr) IV Continuous <Continuous>  levothyroxine Injectable 55 MICROGram(s) IV Push at bedtime  phytonadione  IVPB 5 milliGRAM(s) IV Intermittent once  sodium chloride 0.9% lock flush 3 milliLiter(s) IV Push every 8 hours    MEDICATIONS  (PRN):  diphenhydrAMINE   Injectable 25 milliGRAM(s) IV Push every 4 hours PRN Pruritus  HYDROmorphone (10 MICROgram(s)/mL) + BUpivacaine 0.0625% in 0.9% Sodium Chloride PCEA Rescue Clinician  Bolus 5 milliLiter(s) Epidural every 15 minutes PRN for Pain Score greater than 6  naloxone Injectable 0.1 milliGRAM(s) IV Push every 3 minutes PRN For ANY of the following changes in patient status:  A. RR LESS THAN 10 breaths per minute, B. Oxygen saturation LESS THAN 90%, C. Sedation score of 6  ondansetron Injectable 4 milliGRAM(s) IV Push every 6 hours PRN Nausea      OBJECTIVE:    Assessment of Catheter Site:	[ ] Left	[ ] Right  [x ] Epidural 	[ ] Femoral	      [ ] Saphenous   [ ] Supraclavicular   [ ] Other:    [x ] Dressing intact	[x ] Site non-tender	[ x] Site without erythema, discharge, edema  [x ] Epidural tubing and connection checked	[x] Gross neurological exam within normal limits  [ ] Catheter removed – tip intact		[ ] Afebrile  	[ ] Febrile: ___   [ X] see Temp under VS below)    PT/INR - ( 23 Feb 2019 06:14 )   PT: 15.6 SEC;   INR: 1.39          PTT - ( 23 Feb 2019 06:14 )  PTT:21.4 SEC                      9.5    17.89 )-----------( 339      ( 22 Feb 2019 18:53 )             29.7     Vital Signs Last 24 Hrs  T(C): 37.1 (02-23-19 @ 04:58), Max: 37.1 (02-22-19 @ 21:35)  T(F): 98.7 (02-23-19 @ 04:58), Max: 98.7 (02-22-19 @ 21:35)  HR: 88 (02-23-19 @ 04:58) (81 - 106)  BP: 126/67 (02-23-19 @ 04:58) (118/68 - 142/83)  BP(mean): 82 (02-22-19 @ 20:30) (74 - 108)  RR: 18 (02-23-19 @ 04:58) (13 - 26)  SpO2: 100% (02-23-19 @ 04:58) (99% - 100%)      Sedation Score:	[x ] Alert	[ ] Drowsy	[ ] Arousable	[ ] Asleep	[ ] Unresponsive    Side Effects:	[x ] None	[ ] Nausea	[ ] Vomiting	[ ] Pruritus  		[ ] Weakness		[ ] Numbness	[ ] Other:    ASSESSMENT/ PLAN:    Therapy to  be:	[x ] Continue   [ ] Discontinued   [ ] Change to prn Analgesics    Documentation and Verification of current medications:  [ X ] Done	[ ] Not done, not eligible, reason:    Comments: Doing OK with epidural.  INR elevated. Surgery notified and ordered vitamin k 5 mg IV.  Team instructed to follow up INR and AM CBC.    Progress Note written now but Patient was seen earlier.
Anesthesia Pain Management Service: Day __ of Epidural    SUBJECTIVE: Patient doing well with PCEA and no problems.  Pain Scale Score:   Refer to charted pain scores    THERAPY:  [x ] Epidural Bupivacaine 0.0625% and Hydromorphone  		[ ] 10 micrograms/mL	[ ] 5 micrograms/mL  [ ] Epidural Bupivacaine 0.0625% and Fentanyl - 2 micrograms/mL  [ ] Epidural Ropivacaine 0.1% plain – 1 mg/mL  [ ] Patient Controlled Regional Anesthesia (PCRA) Ropivacaine  		[ ] 0.2%			[ ] 0.1%    Demand dose __3_ lockout __15_ (minutes) Continuous Rate ___ Total: __108_ Daily      MEDICATIONS  (STANDING):  acetaminophen  IVPB .. 1000 milliGRAM(s) IV Intermittent once  acetaminophen  IVPB .. 1000 milliGRAM(s) IV Intermittent once  dextrose 5% + sodium chloride 0.45%. 1000 milliLiter(s) (125 mL/Hr) IV Continuous <Continuous>  dextrose 50% Injectable 12.5 Gram(s) IV Push once  heparin  Injectable 5000 Unit(s) SubCutaneous every 12 hours  HYDROmorphone (10 MICROgram(s)/mL) + BUpivacaine 0.0625% in 0.9% Sodium Chloride PCEA 250 milliLiter(s) Epidural PCA Continuous  insulin lispro (HumaLOG) corrective regimen sliding scale   SubCutaneous every 6 hours  levothyroxine Injectable 55 MICROGram(s) IV Push at bedtime  sodium chloride 0.9% lock flush 3 milliLiter(s) IV Push every 8 hours    MEDICATIONS  (PRN):  diphenhydrAMINE   Injectable 25 milliGRAM(s) IV Push every 4 hours PRN Pruritus  HYDROmorphone (10 MICROgram(s)/mL) + BUpivacaine 0.0625% in 0.9% Sodium Chloride PCEA Rescue Clinician  Bolus 5 milliLiter(s) Epidural every 15 minutes PRN for Pain Score greater than 6  naloxone Injectable 0.1 milliGRAM(s) IV Push every 3 minutes PRN For ANY of the following changes in patient status:  A. RR LESS THAN 10 breaths per minute, B. Oxygen saturation LESS THAN 90%, C. Sedation score of 6  ondansetron Injectable 4 milliGRAM(s) IV Push every 6 hours PRN Nausea      OBJECTIVE:    Assessment of Catheter Site:	[ ] Left	[ ] Right  [x ] Epidural 	[ ] Femoral	      [ ] Saphenous   [ ] Supraclavicular   [ ] Other:    [x ] Dressing intact	[x ] Site non-tender	[ x] Site without erythema, discharge, edema  [x ] Epidural tubing and connection checked	[x] Gross neurological exam within normal limits  [ ] Catheter removed – tip intact		[x ] Afebrile	[ ] Febrile: ___    PT/INR - ( 24 Feb 2019 05:40 )   PT: 14.5 SEC;   INR: 1.26          PTT - ( 24 Feb 2019 05:40 )  PTT:29.3 SEC                      9.2    19.31 )-----------( 358      ( 24 Feb 2019 05:40 )             29.1     Vital Signs Last 24 Hrs  T(C): 37.2 (02-24-19 @ 05:47), Max: 37.2 (02-24-19 @ 05:47)  T(F): 98.9 (02-24-19 @ 05:47), Max: 98.9 (02-24-19 @ 05:47)  HR: 98 (02-24-19 @ 05:47) (86 - 98)  BP: 119/51 (02-24-19 @ 05:47) (116/67 - 122/67)  BP(mean): --  RR: 18 (02-24-19 @ 05:47) (17 - 18)  SpO2: 100% (02-24-19 @ 05:47) (99% - 100%)      Sedation Score:	[x ] Alert	[ ] Drowsy	[ ] Arousable	[ ] Asleep	[ ] Unresponsive    Side Effects:	[x ] None	[ ] Nausea	[ ] Vomiting	[ ] Pruritus  		[ ] Weakness		[ ] Numbness	[ ] Other:    ASSESSMENT/ PLAN:    Therapy to  be:	[x ] Continue   [ ] Discontinued   [ ] Change to prn Analgesics    Documentation and Verification of current medications:  [ X ] Done	[ ] Not done, not eligible, reason:    Comments: Doing OK with epidural and may continue.
GENERAL SURGERY DAILY PROGRESS NOTE:     Subjective:  Pt seen and examined. No acute events overnight. Denies n/v. NGT in place. Reports he feels some movement in the ostomy. Pain well controlled, on standing tylenol and toradol.     Objective:  NAD, awake and alert  Respirations nonlabored  Abdomen soft, nontender, nondistended, ostomy w/ some output, minimal air, NGT in place w/ bilious output  No guarding or rebound tenderness    MEDICATIONS  (STANDING):  acetaminophen  IVPB .. 1000 milliGRAM(s) IV Intermittent once  acetaminophen  IVPB .. 1000 milliGRAM(s) IV Intermittent once  acetaminophen  IVPB .. 1000 milliGRAM(s) IV Intermittent once  acetaminophen  IVPB .. 1000 milliGRAM(s) IV Intermittent once  dextrose 5% + sodium chloride 0.45% 1000 milliLiter(s) (125 mL/Hr) IV Continuous <Continuous>  dextrose 50% Injectable 12.5 Gram(s) IV Push once  heparin  Injectable 5000 Unit(s) SubCutaneous every 8 hours  HYDROmorphone PCA (1 mG/mL) 30 milliLiter(s) PCA Continuous PCA Continuous  insulin lispro (HumaLOG) corrective regimen sliding scale   SubCutaneous every 6 hours  ketorolac   Injectable 15 milliGRAM(s) IV Push every 6 hours  levothyroxine Injectable 56 MICROGram(s) IV Push <User Schedule>  pantoprazole  Injectable 40 milliGRAM(s) IV Push daily  sodium chloride 0.9% lock flush 3 milliLiter(s) IV Push every 8 hours    MEDICATIONS  (PRN):  diphenhydrAMINE   Injectable 25 milliGRAM(s) IV Push every 4 hours PRN Pruritus  HYDROmorphone PCA (1 mG/mL) Rescue Clinician Bolus 0.5 milliGRAM(s) IV Push every 15 minutes PRN for Pain Scale GREATER THAN 6  naloxone Injectable 0.1 milliGRAM(s) IV Push every 3 minutes PRN For ANY of the following changes in patient status:  A. RR LESS THAN 10 breaths per minute, B. Oxygen saturation LESS THAN 90%, C. Sedation score of 6  ondansetron Injectable 4 milliGRAM(s) IV Push every 6 hours PRN Nausea  tetracaine/benzocaine/butamben Spray 1 Spray(s) Topical every 3 hours PRN NGT tube      Vital Signs Last 24 Hrs  T(C): 36.4 (01 Mar 2019 00:21), Max: 36.8 (28 Feb 2019 05:28)  T(F): 97.5 (01 Mar 2019 00:21), Max: 98.2 (28 Feb 2019 05:28)  HR: 90 (01 Mar 2019 00:21) (81 - 95)  BP: 113/65 (01 Mar 2019 00:21) (109/68 - 124/68)  BP(mean): --  RR: 18 (01 Mar 2019 00:21) (17 - 19)  SpO2: 100% (01 Mar 2019 00:21) (99% - 100%)    I&O's Detail    27 Feb 2019 07:01  -  28 Feb 2019 07:00  --------------------------------------------------------  IN:    dextrose 5% + sodium chloride 0.45%: 1500 mL  Total IN: 1500 mL    OUT:    Bulb: 325 mL    Ileostomy: 10 mL    Nasoenteral Tube: 400 mL    Voided: 1075 mL  Total OUT: 1810 mL    Total NET: -310 mL      28 Feb 2019 07:01  -  01 Mar 2019 03:49  --------------------------------------------------------  IN:  Total IN: 0 mL    OUT:    Bulb: 200 mL    Ileostomy: 25 mL    Nasoenteral Tube: 400 mL    Voided: 1450 mL  Total OUT: 2075 mL    Total NET: -2075 mL          Daily     Daily     LABS:                        8.4    11.77 )-----------( 499      ( 28 Feb 2019 06:50 )             26.2     02-28    138  |  105  |  5<L>  ----------------------------<  125<H>  4.2   |  26  |  0.58    Ca    8.3<L>      28 Feb 2019 08:37  Phos  2.9     02-28  Mg     1.7     02-28      PT/INR - ( 27 Feb 2019 05:55 )   PT: 13.2 SEC;   INR: 1.18          PTT - ( 27 Feb 2019 05:55 )  PTT:29.5 SEC      RADIOLOGY & ADDITIONAL STUDIES:
GENERAL SURGERY DAILY PROGRESS NOTE:     Subjective:  Pt seen and examined. No acute events overnight. Pain well controlled, taken off pca yesterday. Had pain after PO intake yesterday, however improved. Now tolerating LRD. Denies n/v. Having ostomy function.     Objective:  NAD, awake and alert  Respirations nonlabored  Abdomen soft, nontender, nondistended, ostomy w/ brown output and air  No guarding or rebound tenderness      MEDICATIONS  (STANDING):  dextrose 50% Injectable 12.5 Gram(s) IV Push once  heparin  Injectable 5000 Unit(s) SubCutaneous every 8 hours  insulin lispro (HumaLOG) corrective regimen sliding scale   SubCutaneous Before meals and at bedtime  levothyroxine 112 MICROGram(s) Oral daily  pantoprazole    Tablet 40 milliGRAM(s) Oral before breakfast  psyllium Powder 1 Packet(s) Oral daily  sodium chloride 0.9% lock flush 3 milliLiter(s) IV Push every 8 hours    MEDICATIONS  (PRN):  acetaminophen   Tablet .. 650 milliGRAM(s) Oral every 6 hours PRN Mild Pain (1 - 3)  oxyCODONE    IR 5 milliGRAM(s) Oral every 4 hours PRN Moderate Pain (4 - 6)  oxyCODONE    IR 10 milliGRAM(s) Oral every 6 hours PRN Severe Pain (7 - 10)  simethicone 80 milliGRAM(s) Chew once PRN Gas      Vital Signs Last 24 Hrs  T(C): 36.7 (04 Mar 2019 23:36), Max: 37 (04 Mar 2019 06:05)  T(F): 98 (04 Mar 2019 23:36), Max: 98.6 (04 Mar 2019 06:05)  HR: 73 (04 Mar 2019 23:36) (73 - 89)  BP: 97/64 (04 Mar 2019 23:36) (91/57 - 103/60)  BP(mean): --  RR: 18 (04 Mar 2019 23:36) (18 - 18)  SpO2: 100% (04 Mar 2019 23:36) (98% - 100%)    I&O's Detail    03 Mar 2019 07:01  -  04 Mar 2019 07:00  --------------------------------------------------------  IN:    dextrose 5% + sodium chloride 0.45%: 1100 mL    IV PiggyBack: 50 mL    Oral Fluid: 800 mL  Total IN: 1950 mL    OUT:    Bulb: 135 mL    Ileostomy: 870 mL    Voided: 1750 mL  Total OUT: 2755 mL    Total NET: -805 mL      04 Mar 2019 07:01  -  05 Mar 2019 04:02  --------------------------------------------------------  IN:  Total IN: 0 mL    OUT:    Ileostomy: 450 mL    Voided: 350 mL  Total OUT: 800 mL    Total NET: -800 mL          Daily     Daily     LABS:                        9.3    11.74 )-----------( 741      ( 04 Mar 2019 06:10 )             28.3     03-04    136  |  102  |  3<L>  ----------------------------<  92  4.1   |  26  |  0.58    Ca    8.6      04 Mar 2019 06:10  Phos  3.5     03-04  Mg     1.7     03-04            RADIOLOGY & ADDITIONAL STUDIES:
GENERAL SURGERY DAILY PROGRESS NOTE:     Subjective:  Pt seen and examined. No acute events overnight. Pain well controlled. Denies n/v. No function in ostomy. NGT in place. Pt reports he is voiding and that he has been oob.     Objective:  NAD, awake and alert  Respirations nonlabored  Abdomen soft, nontender, nondistended, ostomy w/ minimal serous output, NGT in place w/ bilious output  No guarding or rebound tenderness      MEDICATIONS  (STANDING):  acetaminophen  IVPB .. 1000 milliGRAM(s) IV Intermittent once  dextrose 5% + sodium chloride 0.45% 1000 milliLiter(s) (125 mL/Hr) IV Continuous <Continuous>  dextrose 50% Injectable 12.5 Gram(s) IV Push once  heparin  Injectable 5000 Unit(s) SubCutaneous every 8 hours  HYDROmorphone PCA (1 mG/mL) 30 milliLiter(s) PCA Continuous PCA Continuous  insulin lispro (HumaLOG) corrective regimen sliding scale   SubCutaneous every 6 hours  levothyroxine Injectable 56 MICROGram(s) IV Push <User Schedule>  pantoprazole  Injectable 40 milliGRAM(s) IV Push daily  sodium chloride 0.9% lock flush 3 milliLiter(s) IV Push every 8 hours    MEDICATIONS  (PRN):  diphenhydrAMINE   Injectable 25 milliGRAM(s) IV Push every 4 hours PRN Pruritus  HYDROmorphone PCA (1 mG/mL) Rescue Clinician Bolus 0.5 milliGRAM(s) IV Push every 15 minutes PRN for Pain Scale GREATER THAN 6  naloxone Injectable 0.1 milliGRAM(s) IV Push every 3 minutes PRN For ANY of the following changes in patient status:  A. RR LESS THAN 10 breaths per minute, B. Oxygen saturation LESS THAN 90%, C. Sedation score of 6  ondansetron Injectable 4 milliGRAM(s) IV Push every 6 hours PRN Nausea  tetracaine/benzocaine/butamben Spray 1 Spray(s) Topical every 3 hours PRN NGT tube      Vital Signs Last 24 Hrs  T(C): 36.2 (28 Feb 2019 12:12), Max: 36.8 (27 Feb 2019 15:51)  T(F): 97.2 (28 Feb 2019 12:12), Max: 98.2 (27 Feb 2019 15:51)  HR: 92 (28 Feb 2019 12:12) (78 - 92)  BP: 109/68 (28 Feb 2019 12:12) (109/66 - 124/68)  BP(mean): --  RR: 19 (28 Feb 2019 12:12) (17 - 19)  SpO2: 100% (28 Feb 2019 12:12) (99% - 100%)    I&O's Detail    27 Feb 2019 07:01  -  28 Feb 2019 07:00  --------------------------------------------------------  IN:    dextrose 5% + sodium chloride 0.45%: 1500 mL  Total IN: 1500 mL    OUT:    Bulb: 325 mL    Ileostomy: 10 mL    Nasoenteral Tube: 400 mL    Voided: 1075 mL  Total OUT: 1810 mL    Total NET: -310 mL      28 Feb 2019 07:01  -  28 Feb 2019 15:15  --------------------------------------------------------  IN:  Total IN: 0 mL    OUT:    Bulb: 100 mL    Nasoenteral Tube: 150 mL    Voided: 700 mL  Total OUT: 950 mL    Total NET: -950 mL          Daily     Daily     LABS:                        8.4    11.77 )-----------( 499      ( 28 Feb 2019 06:50 )             26.2     02-28    138  |  105  |  5<L>  ----------------------------<  125<H>  4.2   |  26  |  0.58    Ca    8.3<L>      28 Feb 2019 08:37  Phos  2.9     02-28  Mg     1.7     02-28      PT/INR - ( 27 Feb 2019 05:55 )   PT: 13.2 SEC;   INR: 1.18          PTT - ( 27 Feb 2019 05:55 )  PTT:29.5 SEC      RADIOLOGY & ADDITIONAL STUDIES:
GENERAL SURGERY DAILY PROGRESS NOTE:     Subjective:  Pt seen and examined. No acute events overnight. Pain well controlled. NGT pulled, pt w/o nausea overnight. Passed TOV. Now w/ PCA.     Objective:  NAD, awake and alert  Respirations nonlabored  Abdomen soft, nontender, nondistended, incision site c/d/i, ostomy w/ minimal output no air  No guarding or rebound tenderness    MEDICATIONS  (STANDING):  acetaminophen  IVPB .. 1000 milliGRAM(s) IV Intermittent once  dextrose 5% + sodium chloride 0.45% 1000 milliLiter(s) (125 mL/Hr) IV Continuous <Continuous>  dextrose 50% Injectable 12.5 Gram(s) IV Push once  heparin  Injectable 5000 Unit(s) SubCutaneous every 8 hours  HYDROmorphone PCA (1 mG/mL) 30 milliLiter(s) PCA Continuous PCA Continuous  insulin lispro (HumaLOG) corrective regimen sliding scale   SubCutaneous every 6 hours  levothyroxine Injectable 56 MICROGram(s) IV Push <User Schedule>  sodium chloride 0.9% lock flush 3 milliLiter(s) IV Push every 8 hours    MEDICATIONS  (PRN):  diphenhydrAMINE   Injectable 25 milliGRAM(s) IV Push every 4 hours PRN Pruritus  HYDROmorphone PCA (1 mG/mL) Rescue Clinician Bolus 0.5 milliGRAM(s) IV Push every 15 minutes PRN for Pain Scale GREATER THAN 6  naloxone Injectable 0.1 milliGRAM(s) IV Push every 3 minutes PRN For ANY of the following changes in patient status:  A. RR LESS THAN 10 breaths per minute, B. Oxygen saturation LESS THAN 90%, C. Sedation score of 6  ondansetron Injectable 4 milliGRAM(s) IV Push every 6 hours PRN Nausea      Vital Signs Last 24 Hrs  T(C): 36.7 (26 Feb 2019 13:26), Max: 36.8 (26 Feb 2019 07:58)  T(F): 98 (26 Feb 2019 13:26), Max: 98.2 (26 Feb 2019 07:58)  HR: 88 (26 Feb 2019 13:26) (76 - 88)  BP: 105/68 (26 Feb 2019 13:26) (103/64 - 114/66)  BP(mean): --  RR: 18 (26 Feb 2019 13:26) (16 - 18)  SpO2: 99% (26 Feb 2019 13:26) (99% - 100%)    I&O's Detail    25 Feb 2019 07:01  -  26 Feb 2019 07:00  --------------------------------------------------------  IN:    dextrose 5% + sodium chloride 0.45%.: 1500 mL    IV PiggyBack: 400 mL  Total IN: 1900 mL    OUT:    Bulb: 460 mL    Ileostomy: 50 mL    Indwelling Catheter - Urethral: 2125 mL    Nasoenteral Tube: 150 mL    Voided: 250 mL  Total OUT: 3035 mL    Total NET: -1135 mL      26 Feb 2019 07:01  -  26 Feb 2019 16:23  --------------------------------------------------------  IN:  Total IN: 0 mL    OUT:    Bulb: 210 mL    Ileostomy: 375 mL    Indwelling Catheter - Urethral: 375 mL    Voided: 225 mL  Total OUT: 1185 mL    Total NET: -1185 mL          Daily     Daily     LABS:                        9.7    11.08 )-----------( 445      ( 26 Feb 2019 09:04 )             31.0     02-26    139  |  103  |  3<L>  ----------------------------<  128<H>  3.4<L>   |  28  |  0.52    Ca    8.3<L>      26 Feb 2019 06:40  Phos  2.9     02-26  Mg     1.7     02-26      PT/INR - ( 26 Feb 2019 09:04 )   PT: 13.7 SEC;   INR: 1.20          PTT - ( 26 Feb 2019 09:04 )  PTT:29.4 SEC      RADIOLOGY & ADDITIONAL STUDIES:
GENERAL SURGERY DAILY PROGRESS NOTE:     Subjective:  Pt seen and examined. No acute events overnight. pain well controlled. Denies n/v. NGT and mireles in place. Denies flatus and bms.     Objective:  NAD, awake and alert  Respirations nonlabored  Abdomen soft, appropriately tender, mildly distended, incision site c/d/i  No guarding or rebound tenderness      MEDICATIONS  (STANDING):  cefoTEtan  IVPB 2 Gram(s) IV Intermittent every 12 hours  dextrose 50% Injectable 12.5 Gram(s) IV Push once  heparin  Injectable 5000 Unit(s) SubCutaneous every 12 hours  HYDROmorphone (10 MICROgram(s)/mL) + BUpivacaine 0.0625% in 0.9% Sodium Chloride PCEA 250 milliLiter(s) Epidural PCA Continuous  insulin lispro (HumaLOG) corrective regimen sliding scale   SubCutaneous every 6 hours  lactated ringers. 1000 milliLiter(s) (125 mL/Hr) IV Continuous <Continuous>  levothyroxine Injectable 55 MICROGram(s) IV Push at bedtime  sodium chloride 0.9% lock flush 3 milliLiter(s) IV Push every 8 hours    MEDICATIONS  (PRN):  diphenhydrAMINE   Injectable 25 milliGRAM(s) IV Push every 4 hours PRN Pruritus  HYDROmorphone (10 MICROgram(s)/mL) + BUpivacaine 0.0625% in 0.9% Sodium Chloride PCEA Rescue Clinician  Bolus 5 milliLiter(s) Epidural every 15 minutes PRN for Pain Score greater than 6  naloxone Injectable 0.1 milliGRAM(s) IV Push every 3 minutes PRN For ANY of the following changes in patient status:  A. RR LESS THAN 10 breaths per minute, B. Oxygen saturation LESS THAN 90%, C. Sedation score of 6  ondansetron Injectable 4 milliGRAM(s) IV Push every 6 hours PRN Nausea      Vital Signs Last 24 Hrs  T(C): 37 (23 Feb 2019 09:00), Max: 37.1 (22 Feb 2019 21:35)  T(F): 98.6 (23 Feb 2019 09:00), Max: 98.7 (22 Feb 2019 21:35)  HR: 86 (23 Feb 2019 09:00) (81 - 106)  BP: 122/67 (23 Feb 2019 09:00) (118/68 - 142/83)  BP(mean): 82 (22 Feb 2019 20:30) (74 - 108)  RR: 17 (23 Feb 2019 09:00) (13 - 26)  SpO2: 99% (23 Feb 2019 09:00) (99% - 100%)    I&O's Detail    22 Feb 2019 07:01  -  23 Feb 2019 07:00  --------------------------------------------------------  IN:    lactated ringers.: 50 mL    lactated ringers.: 750 mL  Total IN: 800 mL    OUT:    Bulb: 170 mL    Indwelling Catheter - Urethral: 735 mL    Nasoenteral Tube: 90 mL  Total OUT: 995 mL    Total NET: -195 mL      23 Feb 2019 07:01  -  23 Feb 2019 10:19  --------------------------------------------------------  IN:  Total IN: 0 mL    OUT:    Bulb: 165 mL  Total OUT: 165 mL    Total NET: -165 mL          Daily     Daily     LABS:                        9.5    17.89 )-----------( 339      ( 22 Feb 2019 18:53 )             29.7     02-22    142  |  101  |  15  ----------------------------<  129<H>  3.9   |  23  |  0.60    Ca    8.4      22 Feb 2019 14:10  Phos  3.5     02-22  Mg     1.9     02-22      PT/INR - ( 23 Feb 2019 06:14 )   PT: 15.6 SEC;   INR: 1.39          PTT - ( 23 Feb 2019 06:14 )  PTT:21.4 SEC      RADIOLOGY & ADDITIONAL STUDIES:
GENERAL SURGERY DAILY PROGRESS NOTE:     Subjective:  Pt seen and examined. Overnight pt w/ episodes of vomiting, NGT replaced and pt reports feeling less bloated. Denies vomiting this am. Passed tov yesterday.     Objective:  NAD, awake and alert  Respirations nonlabored  Abdomen soft, nontender, nondistended, incision site c/d/i, ostomy w/ fluid output  No guarding or rebound tenderness      MEDICATIONS  (STANDING):  acetaminophen  IVPB .. 1000 milliGRAM(s) IV Intermittent once  dextrose 5% + sodium chloride 0.45% 1000 milliLiter(s) (125 mL/Hr) IV Continuous <Continuous>  dextrose 50% Injectable 12.5 Gram(s) IV Push once  heparin  Injectable 5000 Unit(s) SubCutaneous every 8 hours  HYDROmorphone PCA (1 mG/mL) 30 milliLiter(s) PCA Continuous PCA Continuous  insulin lispro (HumaLOG) corrective regimen sliding scale   SubCutaneous every 6 hours  levothyroxine Injectable 56 MICROGram(s) IV Push <User Schedule>  sodium chloride 0.9% lock flush 3 milliLiter(s) IV Push every 8 hours    MEDICATIONS  (PRN):  diphenhydrAMINE   Injectable 25 milliGRAM(s) IV Push every 4 hours PRN Pruritus  HYDROmorphone PCA (1 mG/mL) Rescue Clinician Bolus 0.5 milliGRAM(s) IV Push every 15 minutes PRN for Pain Scale GREATER THAN 6  naloxone Injectable 0.1 milliGRAM(s) IV Push every 3 minutes PRN For ANY of the following changes in patient status:  A. RR LESS THAN 10 breaths per minute, B. Oxygen saturation LESS THAN 90%, C. Sedation score of 6  ondansetron Injectable 4 milliGRAM(s) IV Push every 6 hours PRN Nausea  tetracaine/benzocaine/butamben Spray 1 Spray(s) Topical every 3 hours PRN NGT tube      Vital Signs Last 24 Hrs  T(C): 36.4 (27 Feb 2019 06:28), Max: 36.8 (26 Feb 2019 07:58)  T(F): 97.5 (27 Feb 2019 06:28), Max: 98.2 (26 Feb 2019 07:58)  HR: 80 (27 Feb 2019 06:28) (80 - 97)  BP: 115/71 (27 Feb 2019 06:28) (103/64 - 129/83)  BP(mean): --  RR: 17 (27 Feb 2019 06:28) (16 - 18)  SpO2: 100% (27 Feb 2019 06:28) (99% - 100%)    I&O's Detail    26 Feb 2019 07:01  -  27 Feb 2019 07:00  --------------------------------------------------------  IN:    dextrose 5% + sodium chloride 0.45%: 1625 mL  Total IN: 1625 mL    OUT:    Bulb: 970 mL    Emesis: 300 mL    Ileostomy: 1150 mL    Indwelling Catheter - Urethral: 375 mL    Nasoenteral Tube: 250 mL    Voided: 1100 mL  Total OUT: 4145 mL    Total NET: -2520 mL          Daily     Daily     LABS:                        10.4   15.94 )-----------( 528      ( 27 Feb 2019 05:55 )             31.5     02-27    135  |  102  |  3<L>  ----------------------------<  152<H>  4.0   |  23  |  0.48<L>    Ca    8.4      27 Feb 2019 05:55  Phos  2.5     02-27  Mg     1.7     02-27      PT/INR - ( 27 Feb 2019 05:55 )   PT: 13.2 SEC;   INR: 1.18          PTT - ( 27 Feb 2019 05:55 )  PTT:29.5 SEC      RADIOLOGY & ADDITIONAL STUDIES:
SURGERY DAILY PROGRESS NOTE:       SUBJECTIVE/ROS: Patient examined at bedside. No acute events overnight. CT abd showed no obstruction, NGT was d/c'ed and advanced to CLD this am. Pain well controlled. OOB.          MEDICATIONS  (STANDING):  acetaminophen  IVPB .. 1000 milliGRAM(s) IV Intermittent once  dextrose 5% + sodium chloride 0.45% 1000 milliLiter(s) (75 mL/Hr) IV Continuous <Continuous>  dextrose 50% Injectable 12.5 Gram(s) IV Push once  heparin  Injectable 5000 Unit(s) SubCutaneous every 8 hours  HYDROmorphone PCA (1 mG/mL) 30 milliLiter(s) PCA Continuous PCA Continuous  insulin lispro (HumaLOG) corrective regimen sliding scale   SubCutaneous every 6 hours  levothyroxine Injectable 56 MICROGram(s) IV Push <User Schedule>  magnesium sulfate  IVPB 2 Gram(s) IV Intermittent once  pantoprazole  Injectable 40 milliGRAM(s) IV Push daily  sodium chloride 0.9% lock flush 3 milliLiter(s) IV Push every 8 hours    MEDICATIONS  (PRN):  diphenhydrAMINE   Injectable 25 milliGRAM(s) IV Push every 4 hours PRN Pruritus  HYDROmorphone PCA (1 mG/mL) Rescue Clinician Bolus 0.5 milliGRAM(s) IV Push every 15 minutes PRN for Pain Scale GREATER THAN 6  naloxone Injectable 0.1 milliGRAM(s) IV Push every 3 minutes PRN For ANY of the following changes in patient status:  A. RR LESS THAN 10 breaths per minute, B. Oxygen saturation LESS THAN 90%, C. Sedation score of 6  ondansetron Injectable 4 milliGRAM(s) IV Push every 6 hours PRN Nausea  tetracaine/benzocaine/butamben Spray 1 Spray(s) Topical every 3 hours PRN NGT tube      OBJECTIVE:    Vital Signs Last 24 Hrs  T(C): 37.1 (03 Mar 2019 08:25), Max: 37.3 (02 Mar 2019 19:47)  T(F): 98.8 (03 Mar 2019 08:25), Max: 99.2 (02 Mar 2019 19:47)  HR: 70 (03 Mar 2019 08:25) (67 - 82)  BP: 107/60 (03 Mar 2019 08:25) (101/60 - 112/66)  BP(mean): --  RR: 18 (03 Mar 2019 08:25) (18 - 18)  SpO2: 98% (03 Mar 2019 08:25) (98% - 100%)        I&O's Detail    02 Mar 2019 07:01  -  03 Mar 2019 07:00  --------------------------------------------------------  IN:  Total IN: 0 mL    OUT:    Bulb: 280 mL    Ileostomy: 150 mL    Nasoenteral Tube: 850 mL    Voided: 1800 mL  Total OUT: 3080 mL    Total NET: -3080 mL      03 Mar 2019 07:01  -  03 Mar 2019 12:59  --------------------------------------------------------  IN:    dextrose 5% + sodium chloride 0.45%: 250 mL  Total IN: 250 mL    OUT:    Bulb: 85 mL    Ileostomy: 270 mL  Total OUT: 355 mL    Total NET: -105 mL          Daily     Daily     LABS:                        9.2    11.87 )-----------( 762      ( 03 Mar 2019 06:20 )             28.8     03-03    141  |  105  |  3<L>  ----------------------------<  117<H>  4.0   |  24  |  0.61    Ca    8.6      03 Mar 2019 06:20  Phos  3.6     03-03  Mg     1.7     03-03                Objective:  NAD, awake and alert  Respirations nonlabored  Abdomen soft, nontender, nondistended, ostomy w/ some output, minimal air  No guarding or rebound tenderness
SURGERY DAILY PROGRESS NOTE:       SUBJECTIVE/ROS: Patient examined at bedside. No acute events overnight. NGT w/o N/V. -Flatus/-BM. Adequate UOP. Pain well controlled.          MEDICATIONS  (STANDING):  acetaminophen  IVPB .. 1000 milliGRAM(s) IV Intermittent once  dextrose 5% + sodium chloride 0.45%. 1000 milliLiter(s) (125 mL/Hr) IV Continuous <Continuous>  dextrose 50% Injectable 12.5 Gram(s) IV Push once  heparin  Injectable 5000 Unit(s) SubCutaneous every 12 hours  HYDROmorphone (10 MICROgram(s)/mL) + BUpivacaine 0.0625% in 0.9% Sodium Chloride PCEA 250 milliLiter(s) Epidural PCA Continuous  insulin lispro (HumaLOG) corrective regimen sliding scale   SubCutaneous every 6 hours  levothyroxine Injectable 55 MICROGram(s) IV Push at bedtime  levothyroxine Injectable 56 MICROGram(s) IV Push at bedtime  sodium chloride 0.9% lock flush 3 milliLiter(s) IV Push every 8 hours    MEDICATIONS  (PRN):  diphenhydrAMINE   Injectable 25 milliGRAM(s) IV Push every 4 hours PRN Pruritus  HYDROmorphone (10 MICROgram(s)/mL) + BUpivacaine 0.0625% in 0.9% Sodium Chloride PCEA Rescue Clinician  Bolus 5 milliLiter(s) Epidural every 15 minutes PRN for Pain Score greater than 6  naloxone Injectable 0.1 milliGRAM(s) IV Push every 3 minutes PRN For ANY of the following changes in patient status:  A. RR LESS THAN 10 breaths per minute, B. Oxygen saturation LESS THAN 90%, C. Sedation score of 6  ondansetron Injectable 4 milliGRAM(s) IV Push every 6 hours PRN Nausea      OBJECTIVE:    Vital Signs Last 24 Hrs  T(C): 36.7 (24 Feb 2019 13:15), Max: 37.2 (24 Feb 2019 05:47)  T(F): 98 (24 Feb 2019 13:15), Max: 98.9 (24 Feb 2019 05:47)  HR: 75 (24 Feb 2019 13:15) (75 - 98)  BP: 105/67 (24 Feb 2019 13:15) (105/67 - 120/70)  BP(mean): --  RR: 17 (24 Feb 2019 13:15) (17 - 18)  SpO2: 99% (24 Feb 2019 13:15) (99% - 100%)        I&O's Detail    23 Feb 2019 07:01  -  24 Feb 2019 07:00  --------------------------------------------------------  IN:    dextrose 5% + sodium chloride 0.45%.: 125 mL    IV PiggyBack: 350 mL    lactated ringers.: 1150 mL  Total IN: 1625 mL    OUT:    Bulb: 340 mL    Indwelling Catheter - Urethral: 1625 mL    Nasoenteral Tube: 300 mL  Total OUT: 2265 mL    Total NET: -640 mL      24 Feb 2019 07:01  -  24 Feb 2019 15:28  --------------------------------------------------------  IN:    dextrose 5% + sodium chloride 0.45%.: 1000 mL    IV PiggyBack: 400 mL  Total IN: 1400 mL    OUT:    Bulb: 30 mL    Ileostomy: 75 mL    Indwelling Catheter - Urethral: 750 mL    Nasoenteral Tube: 50 mL  Total OUT: 905 mL    Total NET: 495 mL          Daily     Daily     LABS:                        9.2    19.31 )-----------( 358      ( 24 Feb 2019 05:40 )             29.1     02-24    138  |  100  |  9   ----------------------------<  62<L>  3.9   |  25  |  0.64    Ca    8.1<L>      24 Feb 2019 05:40  Phos  2.8     02-24  Mg     1.6     02-24      PT/INR - ( 24 Feb 2019 05:40 )   PT: 14.5 SEC;   INR: 1.26          PTT - ( 24 Feb 2019 05:40 )  PTT:29.3 SEC              Objective:  NAD, awake and alert  Respirations nonlabored  Abdomen soft, appropriately tender, mildly distended, incision site c/d/i  No guarding or rebound tenderness
SURGERY DAILY PROGRESS NOTE:       SUBJECTIVE/ROS: Patient examined at bedside. No acute events overnight. Tolerates CLD w/o N/V. Ostomy fxn w/ gas and stool in the bag. Pain well controlled. OOB. MARGARET amylase low, d/c'ed MARGARET.          MEDICATIONS  (STANDING):  acetaminophen  IVPB .. 1000 milliGRAM(s) IV Intermittent once  dextrose 5% + sodium chloride 0.45% 1000 milliLiter(s) (75 mL/Hr) IV Continuous <Continuous>  dextrose 50% Injectable 12.5 Gram(s) IV Push once  heparin  Injectable 5000 Unit(s) SubCutaneous every 8 hours  insulin lispro (HumaLOG) corrective regimen sliding scale   SubCutaneous every 6 hours  levothyroxine Injectable 56 MICROGram(s) IV Push <User Schedule>  magnesium sulfate  IVPB 2 Gram(s) IV Intermittent once  pantoprazole  Injectable 40 milliGRAM(s) IV Push daily  sodium chloride 0.9% lock flush 3 milliLiter(s) IV Push every 8 hours    MEDICATIONS  (PRN):  diphenhydrAMINE   Injectable 25 milliGRAM(s) IV Push every 4 hours PRN Pruritus  naloxone Injectable 0.1 milliGRAM(s) IV Push every 3 minutes PRN For ANY of the following changes in patient status:  A. RR LESS THAN 10 breaths per minute, B. Oxygen saturation LESS THAN 90%, C. Sedation score of 6  ondansetron Injectable 4 milliGRAM(s) IV Push every 6 hours PRN Nausea      OBJECTIVE:    Vital Signs Last 24 Hrs  T(C): 37 (04 Mar 2019 06:05), Max: 37.1 (03 Mar 2019 08:25)  T(F): 98.6 (04 Mar 2019 06:05), Max: 98.8 (03 Mar 2019 08:25)  HR: 80 (04 Mar 2019 06:05) (70 - 90)  BP: 103/60 (04 Mar 2019 06:05) (103/60 - 113/80)  BP(mean): --  RR: 18 (04 Mar 2019 06:05) (18 - 18)  SpO2: 100% (04 Mar 2019 06:05) (98% - 100%)        I&O's Detail    03 Mar 2019 07:01  -  04 Mar 2019 07:00  --------------------------------------------------------  IN:    dextrose 5% + sodium chloride 0.45%: 1100 mL    IV PiggyBack: 50 mL    Oral Fluid: 800 mL  Total IN: 1950 mL    OUT:    Bulb: 135 mL    Ileostomy: 870 mL    Voided: 1750 mL  Total OUT: 2755 mL    Total NET: -805 mL      04 Mar 2019 07:01  -  04 Mar 2019 07:44  --------------------------------------------------------  IN:  Total IN: 0 mL    OUT:    Voided: 350 mL  Total OUT: 350 mL    Total NET: -350 mL          Daily     Daily     LABS:                        9.3    11.74 )-----------( 741      ( 04 Mar 2019 06:10 )             28.3     03-04    136  |  102  |  3<L>  ----------------------------<  92  4.1   |  26  |  0.58    Ca    8.6      04 Mar 2019 06:10  Phos  3.5     03-04  Mg     1.7     03-04            Objective:  NAD, awake and alert  Respirations nonlabored  Abdomen soft, nontender, nondistended, ostomy w/ brown output and air  No guarding or rebound tenderness
SURGERY DAILY PROGRESS NOTE:       SUBJECTIVE/ROS: Patient examined at bedside. No acute events overnight. Tolerates diet w/o N/V. +BM/+Flatus. Dispo pending Lovenox injections teaching/arrangement          MEDICATIONS  (STANDING):  dextrose 50% Injectable 12.5 Gram(s) IV Push once  enoxaparin Injectable 40 milliGRAM(s) SubCutaneous daily  insulin lispro (HumaLOG) corrective regimen sliding scale   SubCutaneous Before meals and at bedtime  levothyroxine 112 MICROGram(s) Oral daily  pantoprazole    Tablet 40 milliGRAM(s) Oral before breakfast  psyllium Powder 1 Packet(s) Oral daily  sodium chloride 0.9% lock flush 3 milliLiter(s) IV Push every 8 hours    MEDICATIONS  (PRN):  acetaminophen   Tablet .. 650 milliGRAM(s) Oral every 6 hours PRN Mild Pain (1 - 3)  oxyCODONE    IR 5 milliGRAM(s) Oral every 4 hours PRN Moderate Pain (4 - 6)  oxyCODONE    IR 10 milliGRAM(s) Oral every 6 hours PRN Severe Pain (7 - 10)  simethicone 80 milliGRAM(s) Chew once PRN Gas      OBJECTIVE:    Vital Signs Last 24 Hrs  T(C): 36.3 (06 Mar 2019 08:52), Max: 36.6 (06 Mar 2019 00:09)  T(F): 97.3 (06 Mar 2019 08:52), Max: 97.9 (06 Mar 2019 00:09)  HR: 74 (06 Mar 2019 08:52) (71 - 79)  BP: 112/76 (06 Mar 2019 08:52) (98/62 - 112/76)  BP(mean): --  RR: 18 (06 Mar 2019 08:52) (16 - 18)  SpO2: 99% (06 Mar 2019 08:52) (99% - 100%)        I&O's Detail    05 Mar 2019 07:01  -  06 Mar 2019 07:00  --------------------------------------------------------  IN:  Total IN: 0 mL    OUT:    Ileostomy: 640 mL    Voided: 1500 mL  Total OUT: 2140 mL    Total NET: -2140 mL          Daily     Daily     LABS:                        9.7    12.33 )-----------( 767      ( 05 Mar 2019 06:00 )             30.6     03-05    136  |  100  |  8   ----------------------------<  82  4.5   |  27  |  0.73    Ca    9.0      05 Mar 2019 06:00  Phos  3.6     03-05  Mg     1.8     03-05          Objective:  NAD, awake and alert  Respirations nonlabored  Abdomen soft, nontender, nondistended, ostomy w/ brown output and air  No guarding or rebound tenderness
GENERAL SURGERY DAILY PROGRESS NOTE:     Subjective:  Pt seen and examined. No acute events overnight. Pain well controlled. No gi function in ostomy. Denies n/v. NGT in place.     Objective:  NAD, awake and alert  Respirations nonlabored  Abdomen soft, nontender, nondistended, incision site c/d/i, ostomy w/ minimal output no air  No guarding or rebound tenderness      MEDICATIONS  (STANDING):  acetaminophen  IVPB .. 1000 milliGRAM(s) IV Intermittent once  dextrose 5% + sodium chloride 0.45%. 1000 milliLiter(s) (125 mL/Hr) IV Continuous <Continuous>  dextrose 50% Injectable 12.5 Gram(s) IV Push once  heparin  Injectable 5000 Unit(s) SubCutaneous every 12 hours  HYDROmorphone (10 MICROgram(s)/mL) + BUpivacaine 0.0625% in 0.9% Sodium Chloride PCEA 250 milliLiter(s) Epidural PCA Continuous  insulin lispro (HumaLOG) corrective regimen sliding scale   SubCutaneous every 6 hours  levothyroxine Injectable 56 MICROGram(s) IV Push <User Schedule>  sodium chloride 0.9% lock flush 3 milliLiter(s) IV Push every 8 hours    MEDICATIONS  (PRN):  diphenhydrAMINE   Injectable 25 milliGRAM(s) IV Push every 4 hours PRN Pruritus  HYDROmorphone  Injectable 0.5 milliGRAM(s) IV Push every 3 hours PRN severe breakthrough pain  HYDROmorphone (10 MICROgram(s)/mL) + BUpivacaine 0.0625% in 0.9% Sodium Chloride PCEA Rescue Clinician  Bolus 5 milliLiter(s) Epidural every 15 minutes PRN for Pain Score greater than 6  naloxone Injectable 0.1 milliGRAM(s) IV Push every 3 minutes PRN For ANY of the following changes in patient status:  A. RR LESS THAN 10 breaths per minute, B. Oxygen saturation LESS THAN 90%, C. Sedation score of 6  ondansetron Injectable 4 milliGRAM(s) IV Push every 6 hours PRN Nausea      Vital Signs Last 24 Hrs  T(C): 36.7 (25 Feb 2019 12:20), Max: 36.9 (25 Feb 2019 05:13)  T(F): 98 (25 Feb 2019 12:20), Max: 98.5 (25 Feb 2019 05:13)  HR: 90 (25 Feb 2019 12:20) (84 - 90)  BP: 109/66 (25 Feb 2019 12:20) (105/63 - 122/79)  BP(mean): --  RR: 18 (25 Feb 2019 12:20) (18 - 18)  SpO2: 100% (25 Feb 2019 12:20) (97% - 100%)    I&O's Detail    24 Feb 2019 07:01  -  25 Feb 2019 07:00  --------------------------------------------------------  IN:    dextrose 5% + sodium chloride 0.45%.: 1625 mL    IV PiggyBack: 400 mL  Total IN: 2025 mL    OUT:    Bulb: 155 mL    Ileostomy: 155 mL    Indwelling Catheter - Urethral: 3050 mL    Nasoenteral Tube: 110 mL  Total OUT: 3470 mL    Total NET: -1445 mL      25 Feb 2019 07:01  -  25 Feb 2019 13:42  --------------------------------------------------------  IN:    dextrose 5% + sodium chloride 0.45%.: 875 mL    IV PiggyBack: 400 mL  Total IN: 1275 mL    OUT:    Bulb: 150 mL    Ileostomy: 25 mL    Indwelling Catheter - Urethral: 800 mL    Nasoenteral Tube: 50 mL  Total OUT: 1025 mL    Total NET: 250 mL          Daily     Daily     LABS:                        9.0    14.08 )-----------( 379      ( 25 Feb 2019 06:00 )             28.9     02-25    139  |  102  |  4<L>  ----------------------------<  140<H>  3.6   |  30  |  0.56    Ca    8.2<L>      25 Feb 2019 06:00  Phos  2.1     02-25  Mg     1.7     02-25      PT/INR - ( 25 Feb 2019 06:00 )   PT: 13.5 SEC;   INR: 1.21          PTT - ( 25 Feb 2019 06:00 )  PTT:30.1 SEC      RADIOLOGY & ADDITIONAL STUDIES:

## 2019-03-06 NOTE — PROGRESS NOTE ADULT - ASSESSMENT
The patient is a 58y Male who is now s/p R hemicolectomy, omentectomy and splenectomy POD3.     Plan:  - Pain control as needed, PCEA  - DVT ppx  - OOB and ambulating as tolerated  - F/u AM labs  - C/w mireles  - Start home Syntroid       D team [id number]
The patient is a 58y Male who is now s/p R hemicolectomy, omentectomy and splenectomy POD8. Not resolving.     Plan:  - CT w/ IV and PO con  - Pain control as needed, PCA, standing tylenol and toradol  - continue w/ NGT   - monitor ostomy for function  - DVT ppx  - OOB and ambulating as tolerated  - F/u AM labs  -dvt ppx    D team [id number]
The patient is a 58y Male who is now s/p R hemicolectomy, omentectomy and splenectomy POD1.     Plan:  - Pain control as needed, PCA  - DVT ppx  - OOB and ambulating as tolerated  - F/u AM labs  - d/c chi  Seen and discussed w/ Dr. Snider    D team [id number]
The patient is a 58y Male who is now s/p R hemicolectomy, omentectomy and splenectomy POD2.     Plan:  - Pain control as needed, PCA  - DVT ppx  - OOB and ambulating as tolerated  - F/u AM labs  - C/w mireles  - Start home Syntroid   Seen and discussed w/ Dr. Snider    D team [id number]
The patient is a 58y Male who is now s/p R hemicolectomy, omentectomy and splenectomy POD4.     Plan:  - Pain control as needed, PCA  - DVT ppx  - OOB and ambulating as tolerated  - sips and chips  - F/u AM labs  -dvt ppx    D team [id number]
The patient is a 58y Male who is now s/p R hemicolectomy, omentectomy and splenectomy POD5.     Plan:  - Pain control as needed, PCA  - continue w/ NGT  - DVT ppx  - OOB and ambulating as tolerated  - sips and chips  - F/u AM labs  -dvt ppx    D team [id number]
The patient is a 58y Male who is now s/p R hemicolectomy, omentectomy and splenectomy POD6.     Plan:  - Pain control as needed, PCA  - continue w/ NGT as no ostomy function  - DVT ppx  - OOB and ambulating as tolerated  - F/u AM labs  -dvt ppx    D team [id number]
The patient is a 58y Male who is now s/p R hemicolectomy, omentectomy and splenectomy POD7.     Plan:  - Pain control as needed, PCA, standing tylenol and toradol  - continue w/ NGT   - monitor ostomy for function  - DVT ppx  - OOB and ambulating as tolerated  - F/u AM labs  -dvt ppx    D team [id number]
The patient is a 58y Male who is now s/p R hemicolectomy, omentectomy and splenectomy POD8. Not resolving.     Plan:  - CLD, will advance to regular if tolerates   - Pain control as needed, will d/c PCA if tolerates PO intake  - monitor ostomy for function  - DVT ppx  - OOB and ambulating as tolerated  - F/u AM labs  -dvt ppx    D team [id number]
The patient is a 58y Male who is now s/p R hemicolectomy, omentectomy and splenectomy on 2/22 c/b prolonged ileus now resolved.      Plan:  - Advance diet to regular   - D/c PCA if tolerates PO intake  - monitor ostomy for function  - DVT ppx  - OOB and ambulating as tolerated  - F/u AM labs  -dvt ppx    D team [id number]
The patient is a 58y Male who is now s/p R hemicolectomy, omentectomy and splenectomy on 2/22 c/b prolonged ileus now resolved.      Plan:  - LRD  - oral pain control  - monitor ostomy  - metamucil daily  - DVT ppx  - OOB and ambulating as tolerated  - F/u AM labs  -dvt ppx    D team [id number]
The patient is a 58y Male who is now s/p R hemicolectomy, omentectomy and splenectomy on 2/22 c/b prolonged ileus now resolved.      Plan:  - LRD  - oral pain control  - monitor ostomy  - metamucil daily  - DVT ppx  - OOB and ambulating as tolerated  - F/u AM labs  -dvt ppx  - D/c today    D team [id number]

## 2019-03-13 ENCOUNTER — APPOINTMENT (OUTPATIENT)
Dept: SURGICAL ONCOLOGY | Facility: CLINIC | Age: 59
End: 2019-03-13
Payer: COMMERCIAL

## 2019-03-13 VITALS
TEMPERATURE: 98 F | HEIGHT: 66 IN | HEART RATE: 86 BPM | WEIGHT: 117 LBS | OXYGEN SATURATION: 97 % | BODY MASS INDEX: 18.8 KG/M2

## 2019-03-13 PROCEDURE — 99024 POSTOP FOLLOW-UP VISIT: CPT

## 2019-03-13 NOTE — HISTORY OF PRESENT ILLNESS
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  \par \par Final pathology:\par 1) Falciform ligament excision: Metastatic low grade mucinous adenocarcinoma (M1)\par 2) Peritoneum, omentum, resection: Metastatic low grade mucinous adenocarcinoma (M1)\par 3) Spleen: Perisplenic soft tissue with metastatic low grade mucinous adenocarcinoma, splenic parenchyma is uninvolved. 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 \par TUMOR STAGE: [id number]\par -Postoperative course complicated by an ileus, resolved after NGT re-insertion. \par \par Today he is with c/o occasional LUQ and RUQ pain which radiates to his lower back, mostly relieved with Oxycodone. States the ileostomy is functioning well (800-1200 ml's per day) and he occasionally takes Imodium when output goes beyond 1200 ml's.  His appetite is improving and he feels hungry. Denies nausea, vomiting, fever or chills.  Continues Lovenox as per discharge instructions for DVT prophylaxis. \par  \par \par \par PERTINENT HISTORY:\par Initially consulted 12/12/18, referred by Dr. Todd Escalona. \par The patient was seen by Dr. Escalona in November for mild discomfort to the RUQ for many years and significant GERD despite PPI treatment.  [facility]. \par \par Abdominal ultrasound performed on 11/15/18 showed moderate amount of very complex ascites within the right and left abdomen causing mass effect and scalloping of the capsule of the liver. CT was then recommended. No evidence of cholecystitis. \par \par Upper GI series performed on 11/15/18 demonstrated a small hiatal hernia and severe GERD. \par \par CT A/P performed on 12/11/18 for Ascites revealed -FINDINGS: Limited sections through the lung bases demonstrate mild  bilateral atelectasis. There is a small bleb in the left lower lobe.  There is a dilated fluid-filled structure adjacent to and inseparable  from the cecum measuring up to 4.5 cm in diameter, suggestive of mucocele  of the appendix. Extensive complete free fluid is noted within the  abdomen and pelvis. There is scalloping at the liver capsule. 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 There is a tiny hypodense lesion in the liver dome, too small  to characterize. Nonspecific subcapsular a 9 mm hyperdense focus in  hepatic segment 4.  Gallstones in the gallbladder. The pancreas, spleen, adrenals, and kidney  appear unremarkable.  No bowel obstruction or grossly thickened bowel. Moderate amount of stool  in the rectum.  No evidence for free air, or enlarged lymph node.   The urinary bladder appears unremarkable. The prostate is enlarged.  CT IMPRESSION: Findings are suggestive of mucocele of the appendix with  associated malignant pseudomyxoma peritonei and associated hepatic  capsular metastatic implants.   9 mm nonspecific hyperdense focus in hepatic segment 4. If clinically  indicated, abdominal MR without and with IV contrast may be pursued for  further evaluation.\par \par PMH notable for HTN, HLD, total thyroidectomy for reportedly benign thyroid nodules 2013, DM2 (on Metformin), knee surgery 1996, gallstones since 1998. Social alcohol use.  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 \par \par Family history of malignancies involves his mother with ovarian cancer at age 72. His 1 sister underwent genetic testing and was found to be BRCA negative. He states that same sister also had a benign pancreatic tumor removed.  His other sister was diagnosed with breast cancer at age 38, s/p chemo and surgery and tested positive for BRCA gene. He denies any other family history of malignancies. \par \par States he had a colonoscopy in 2/2017 and had 3 polyps excised. He also reports a history of H. Pylori for which he was treated for and most recent endoscopy showed gastritis but no H. Pylori. \par \par He underwent a screening colonoscopy with Dr. Kate Irby on 1/3/19 where he was found to have a descending colon polyp, cecal polyp and a descending polyp with benign pathology. Cecal and terminal biopsy was also benign. \par \par Today he is with c/o mild/dull, 4/10, intermittent right upper quadrant abdominal pain since 2016. The pain is worse with heavy and fatty meals.  He also repots early satiety, 4 lb weight loss, occasional abdominal bloating. Denies changes in appetite or bowel habits. Denies nausea or vomiting. States he recently retired and his sleeping patterns have been thrown off. He states he now sleeps during the day and stays up a lot of the night.

## 2019-03-13 NOTE — CONSULT LETTER
[Dear  ___] : Dear  [unfilled], [Referral Letter:] : I am referring [unfilled] to you for further evaluation.  My most recent evaluation follows. [( Thank you for referring [unfilled] for consultation for _____ )] : Thank you for referring [unfilled] for consultation for [unfilled] [Please see my note below.] : Please see my note below. [Consult Closing:] : Thank you very much for allowing me to participate in the care of this patient.  If you have any questions, please do not hesitate to contact me. [Sincerely,] : Sincerely, [FreeTextEntry3] : Rik Qureshi MD, FICS, FACS\par , Surgical Oncology\par [facility]\par [address]\[place]- [address]\par \par [address]\par [place], [place]- [address]\par \par (mob) [phone]\par (o) [phone]\par (f) [phone]\par

## 2019-03-13 NOTE — ASSESSMENT
[FreeTextEntry1] : IMP:\par CT revealing Mucocele of the appendix with  associated  pseudomyxoma peritonei and associated hepatic  capsular implants. 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  \par \par Final pathology:\par 1) Falciform ligament excision: Metastatic low grade mucinous adenocarcinoma (M1)\par 2) Peritoneum, omentum, resection: Metastatic low grade mucinous adenocarcinoma (M1)\par 3) Spleen: Perisplenic soft tissue with metastatic low grade mucinous adenocarcinoma, splenic parenchyma is uninvolved. 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 \par TUMOR STAGE: [id number]\par \par Functioning ileostomy, healing midline incision with no evidence of infection. Improving appetite. \par \par PLAN:\par 1) Continue to monitor ileostomy output. Take Imodium as needed. \par 2) Xray Barium Enema to evaluate anastomoses in late April 2019\par 3) RTO early May 2019 to discuss ileostomy reversal  \par 4) Referral to medical oncology to discuss chemotherapy- \par 5) Pt instructed to follow up with PCP for post splenectomy immunization.

## 2019-03-13 NOTE — PHYSICAL EXAM
[Normal] : well developed, well nourished, in no acute distress [de-identified] : RLQ ileostomy (+ liquid stool, +flatus), healing midline abdominal incision with no evidence of infection; soft, ND, NT

## 2019-04-03 ENCOUNTER — APPOINTMENT (OUTPATIENT)
Dept: SURGICAL ONCOLOGY | Facility: CLINIC | Age: 59
End: 2019-04-03
Payer: COMMERCIAL

## 2019-04-03 VITALS
BODY MASS INDEX: 17.19 KG/M2 | TEMPERATURE: 97.7 F | HEIGHT: 66 IN | HEART RATE: 132 BPM | SYSTOLIC BLOOD PRESSURE: 94 MMHG | WEIGHT: 107 LBS | DIASTOLIC BLOOD PRESSURE: 69 MMHG

## 2019-04-03 PROCEDURE — 99024 POSTOP FOLLOW-UP VISIT: CPT

## 2019-04-03 RX ORDER — METFORMIN ER 750 MG 750 MG/1
750 TABLET ORAL
Qty: 90 | Refills: 0 | Status: ACTIVE | COMMUNITY
Start: 2019-01-05

## 2019-04-03 RX ORDER — OXYCODONE 5 MG/1
5 TABLET ORAL
Qty: 12 | Refills: 0 | Status: ACTIVE | COMMUNITY
Start: 2019-03-05

## 2019-04-03 RX ORDER — ENOXAPARIN SODIUM 100 MG/ML
40 INJECTION SUBCUTANEOUS
Qty: 11 | Refills: 0 | Status: ACTIVE | COMMUNITY
Start: 2019-03-05

## 2019-04-03 RX ORDER — SIMETHICONE 40MG/0.6ML
2 SUSPENSION, DROPS(FINAL DOSAGE FORM)(ML) ORAL
Qty: 12 | Refills: 0 | Status: ACTIVE | COMMUNITY
Start: 2019-03-05

## 2019-04-03 RX ORDER — LANCETS 28 GAUGE
81 EACH MISCELLANEOUS
Qty: 30 | Refills: 0 | Status: ACTIVE | COMMUNITY
Start: 2019-01-05

## 2019-04-03 RX ORDER — OMEPRAZOLE 40 MG/1
40 CAPSULE, DELAYED RELEASE ORAL
Qty: 90 | Refills: 0 | Status: ACTIVE | COMMUNITY
Start: 2019-01-05

## 2019-04-03 RX ORDER — PANTOPRAZOLE 40 MG/1
40 TABLET, DELAYED RELEASE ORAL
Qty: 14 | Refills: 0 | Status: ACTIVE | COMMUNITY
Start: 2019-03-05

## 2019-04-03 NOTE — PHYSICAL EXAM
[Normal] : well developed, well nourished, in no acute distress [de-identified] : RLQ ileostomy (+ liquid stool, +flatus), healing midline abdominal incision with no evidence of infection; soft, ND, NT

## 2019-04-03 NOTE — HISTORY OF PRESENT ILLNESS
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  \par \par Final pathology:\par 1) Falciform ligament excision: Metastatic low grade mucinous adenocarcinoma (M1)\par 2) Peritoneum, omentum, resection: Metastatic low grade mucinous adenocarcinoma (M1)\par 3) Spleen: Perisplenic soft tissue with metastatic low grade mucinous adenocarcinoma, splenic parenchyma is uninvolved. 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 \par TUMOR STAGE: [id number]\par -Postoperative course complicated by an ileus, resolved after NGT re-insertion. \par \par Today he is with c/o frequent abdominal pains which radiate to his back and to his left shoulder, often relieved with Gas-X and Pepcid.  Denies nausea or vomiting.  States the ileostomy output is 600-900 ml's per day.  He states he continues to lose weight despite eating and drinking ensure supplements. He has lost 10 lbs since his visit 3 weeks ago.  Denies fever or chills.  He is scheduled to see Dr. Hernandez on Monday to discuss chemotherapy.  He would like to have ileostomy reversed however he is still pending a barium enema. \par  \par \par \par PERTINENT HISTORY:\par Initially consulted 12/12/18, referred by Dr. Todd Escalona. \par The patient was seen by Dr. Escalona in November for mild discomfort to the RUQ for many years and significant GERD despite PPI treatment.  Patient is from [facility]. \par \par Abdominal ultrasound performed on 11/15/18 showed moderate amount of very complex ascites within the right and left abdomen causing mass effect and scalloping of the capsule of the liver. CT was then recommended. No evidence of cholecystitis. \par \par Upper GI series performed on 11/15/18 demonstrated a small hiatal hernia and severe GERD. \par \par CT A/P performed on 12/11/18 for Ascites revealed -FINDINGS: Limited sections through the lung bases demonstrate mild  bilateral atelectasis. There is a small bleb in the left lower lobe.  There is a dilated fluid-filled structure adjacent to and inseparable  from the cecum measuring up to 4.5 cm in diameter, suggestive of mucocele  of the appendix. Extensive complete free fluid is noted within the  abdomen and pelvis. There is scalloping at the liver capsule. 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 There is a tiny hypodense lesion in the liver dome, too small  to characterize. Nonspecific subcapsular a 9 mm hyperdense focus in  hepatic segment 4.  Gallstones in the gallbladder. The pancreas, spleen, adrenals, and kidney  appear unremarkable.  No bowel obstruction or grossly thickened bowel. Moderate amount of stool  in the rectum.  No evidence for free air, or enlarged lymph node.   The urinary bladder appears unremarkable. The prostate is enlarged.  CT IMPRESSION: Findings are suggestive of mucocele of the appendix with  associated malignant pseudomyxoma peritonei and associated hepatic  capsular metastatic implants.   9 mm nonspecific hyperdense focus in hepatic segment 4. If clinically  indicated, abdominal MR without and with IV contrast may be pursued for  further evaluation.\par \par PMH notable for HTN, HLD, total thyroidectomy for reportedly benign thyroid nodules 2013, DM2 (on Metformin), knee surgery 1996, gallstones since 1998. Social alcohol use.  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 \par \par Family history of malignancies involves his mother with ovarian cancer at age 72. His 1 sister underwent genetic testing and was found to be BRCA negative. He states that same sister also had a benign pancreatic tumor removed.  His other sister was diagnosed with breast cancer at age 38, s/p chemo and surgery and tested positive for BRCA gene. He denies any other family history of malignancies. \par \par States he had a colonoscopy in 2/2017 and had 3 polyps excised. He also reports a history of H. Pylori for which he was treated for and most recent endoscopy showed gastritis but no H. Pylori. \par \par He underwent a screening colonoscopy with Dr. Kate Irby on 1/3/19 where he was found to have a descending colon polyp, cecal polyp and a descending polyp with benign pathology. Cecal and terminal biopsy was also benign. \par \par Today he is with c/o mild/dull, 4/10, intermittent right upper quadrant abdominal pain since 2016. The pain is worse with heavy and fatty meals.  He also repots early satiety, 4 lb weight loss, occasional abdominal bloating. Denies changes in appetite or bowel habits. Denies nausea or vomiting. States he recently retired and his sleeping patterns have been thrown off. He states he now sleeps during the day and stays up a lot of the night.

## 2019-04-03 NOTE — ASSESSMENT
[FreeTextEntry1] : IMP:\par CT revealing Mucocele of the appendix with  associated  pseudomyxoma peritonei and associated hepatic  capsular implants. 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  \par \par Final pathology:\par 1) Falciform ligament excision: Metastatic low grade mucinous adenocarcinoma (M1)\par 2) Peritoneum, omentum, resection: Metastatic low grade mucinous adenocarcinoma (M1)\par 3) Spleen: Perisplenic soft tissue with metastatic low grade mucinous adenocarcinoma, splenic parenchyma is uninvolved. 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 \par TUMOR STAGE: [id number]\par \par Functioning ileostomy, healing midline incision with no evidence of infection. Gas pains relieved with Pepcid and Gas-X.  Scheduled to meet with Dr. Hernandez this coming Monday.  Has not yet had a Barium enema.  Has had a 10 lb weight loss since his initial post op visit 3 weeks ago.    He received his post splenectomy immunizations. \par \par PLAN:\par 1) Continue to monitor ileostomy output. Take Imodium as needed. \par 2) Xray Barium Enema to evaluate anastomoses now\par 3) Referral to medical oncology to discuss chemotherapy- \par 4) Take Pepcid daily and Gas-X as needed\par 5) Ensure supplements ; Regular diet \par 6) RTO 1-2 weeks

## 2019-04-04 ENCOUNTER — RX RENEWAL (OUTPATIENT)
Age: 59
End: 2019-04-04

## 2019-04-04 RX ORDER — PANTOPRAZOLE 40 MG/1
40 TABLET, DELAYED RELEASE ORAL DAILY
Qty: 30 | Refills: 3 | Status: ACTIVE | COMMUNITY
Start: 2019-04-04 | End: 1900-01-01

## 2019-04-10 ENCOUNTER — FORM ENCOUNTER (OUTPATIENT)
Age: 59
End: 2019-04-10

## 2019-04-11 ENCOUNTER — APPOINTMENT (OUTPATIENT)
Dept: RADIOLOGY | Facility: HOSPITAL | Age: 59
End: 2019-04-11
Payer: COMMERCIAL

## 2019-04-11 ENCOUNTER — OUTPATIENT (OUTPATIENT)
Dept: OUTPATIENT SERVICES | Facility: HOSPITAL | Age: 59
LOS: 1 days | End: 2019-04-11

## 2019-04-11 DIAGNOSIS — Z98.890 OTHER SPECIFIED POSTPROCEDURAL STATES: Chronic | ICD-10-CM

## 2019-04-11 DIAGNOSIS — C78.6 SECONDARY MALIGNANT NEOPLASM OF RETROPERITONEUM AND PERITONEUM: ICD-10-CM

## 2019-04-11 PROCEDURE — 74270 X-RAY XM COLON 1CNTRST STD: CPT | Mod: 26

## 2019-04-17 ENCOUNTER — APPOINTMENT (OUTPATIENT)
Dept: SURGICAL ONCOLOGY | Facility: CLINIC | Age: 59
End: 2019-04-17
Payer: COMMERCIAL

## 2019-04-17 VITALS
HEART RATE: 87 BPM | WEIGHT: 115 LBS | HEIGHT: 66 IN | TEMPERATURE: 97.8 F | BODY MASS INDEX: 18.48 KG/M2 | DIASTOLIC BLOOD PRESSURE: 74 MMHG | SYSTOLIC BLOOD PRESSURE: 109 MMHG

## 2019-04-17 PROCEDURE — 99024 POSTOP FOLLOW-UP VISIT: CPT

## 2019-04-17 RX ORDER — ZOLPIDEM TARTRATE 5 MG/1
5 TABLET ORAL
Qty: 10 | Refills: 0 | Status: ACTIVE | COMMUNITY
Start: 2019-04-08

## 2019-04-17 NOTE — ASSESSMENT
[FreeTextEntry1] : IMP:\par CT revealing Mucocele of the appendix with  associated  pseudomyxoma peritonei and associated hepatic  capsular implants. 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  \par \par Final pathology:\par 1) Falciform ligament excision: Metastatic low grade mucinous adenocarcinoma (M1)\par 2) Peritoneum, omentum, resection: Metastatic low grade mucinous adenocarcinoma (M1)\par 3) Spleen: Perisplenic soft tissue with metastatic low grade mucinous adenocarcinoma, splenic parenchyma is uninvolved. 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 \par TUMOR STAGE: [id number]\par \par He consulted with Dr. Hernandez on 4/10/19. \par Labs 4/10/19: CEA (69); Hep A&B Negative; Hepatitis C REACTIVE (1.78 H); TSH WNL; LFT's slightly elevated; Platelets (660 H)\par \par Xray Barium Enema performed on 4/11/19 with unremarkable findings, specifically no evidence of a leak. \par \par Here to discuss ileostomy reversal.  Feeling well. Ileostomy functioning well. Appetite improved. Has gained 8 lbs over the past 2 weeks. Denies nausea or vomiting. Denies fever or chills.  Healing midline incision with no evidence of infection. Gas pains relieved with Pepcid and Gas-X.\par \par PLAN:\par 1) Continue to monitor ileostomy output. Take Imodium as needed. \par 2) Continue f/u with medical oncology -\par 3) Take Pepcid daily and Gas-X as needed\par 4) Ensure supplements ; Regular diet \par 5)  Schedule  ileostomy reversal

## 2019-04-17 NOTE — REASON FOR VISIT
[Post-Op] : a post-op for [Family Member] : family member [FreeTextEntry2] : metastatic mucinous adenocarcinoma

## 2019-04-17 NOTE — PHYSICAL EXAM
[Normal] : well developed, well nourished, in no acute distress [de-identified] : RLQ ileostomy (+ liquid stool, +flatus), healing midline abdominal incision with no evidence of infection; soft, ND, NT

## 2019-04-17 NOTE — HISTORY OF PRESENT ILLNESS
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  \par \par Final pathology:\par 1) Falciform ligament excision: Metastatic low grade mucinous adenocarcinoma (M1)\par 2) Peritoneum, omentum, resection: Metastatic low grade mucinous adenocarcinoma (M1)\par 3) Spleen: Perisplenic soft tissue with metastatic low grade mucinous adenocarcinoma, splenic parenchyma is uninvolved. 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 \par TUMOR STAGE: [id number]\par -Postoperative course complicated by an ileus, resolved after NGT re-insertion. \par \par He consulted with Dr. Hernandez on 4/10/19. \par Labs 4/10/19: CEA (69); Hep A&B Negative; Hepatitis C reactive (1.78 H); TSH WNL; LFT's slightly elevated; Platelets (660 H)\par \par Xray Barium Enema performed on 4/11/19 with unremarkable findings, specifically no evidence of a leak. \par \par Here to discuss ileostomy reversal.  Feeling well. Ileostomy functioning well. Appetite improved. Has gained 8 lbs over the past 2 weeks. Denies nausea or vomiting. Denies fever or chills. \par \par \par PERTINENT HISTORY:\par Initially consulted 12/12/18, referred by Dr. Todd Escalona. \par The patient was seen by Dr. Escalona in November for mild discomfort to the RUQ for many years and significant GERD despite PPI treatment.  Patient is from [facility]. \par \par Abdominal ultrasound performed on 11/15/18 showed moderate amount of very complex ascites within the right and left abdomen causing mass effect and scalloping of the capsule of the liver. CT was then recommended. No evidence of cholecystitis. \par \par Upper GI series performed on 11/15/18 demonstrated a small hiatal hernia and severe GERD. \par \par CT A/P performed on 12/11/18 for Ascites revealed -FINDINGS: Limited sections through the lung bases demonstrate mild  bilateral atelectasis. There is a small bleb in the left lower lobe.  There is a dilated fluid-filled structure adjacent to and inseparable  from the cecum measuring up to 4.5 cm in diameter, suggestive of mucocele  of the appendix. Extensive complete free fluid is noted within the  abdomen and pelvis. There is scalloping at the liver capsule. 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 There is a tiny hypodense lesion in the liver dome, too small  to characterize. Nonspecific subcapsular a 9 mm hyperdense focus in  hepatic segment 4.  Gallstones in the gallbladder. The pancreas, spleen, adrenals, and kidney  appear unremarkable.  No bowel obstruction or grossly thickened bowel. Moderate amount of stool  in the rectum.  No evidence for free air, or enlarged lymph node.   The urinary bladder appears unremarkable. The prostate is enlarged.  CT IMPRESSION: Findings are suggestive of mucocele of the appendix with  associated malignant pseudomyxoma peritonei and associated hepatic  capsular metastatic implants.   9 mm nonspecific hyperdense focus in hepatic segment 4. If clinically  indicated, abdominal MR without and with IV contrast may be pursued for  further evaluation.\par \par PMH notable for HTN, HLD, total thyroidectomy for reportedly benign thyroid nodules 2013, DM2 (on Metformin), knee surgery 1996, gallstones since 1998. Social alcohol use.  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 \par \par Family history of malignancies involves his mother with ovarian cancer at age 72. His 1 sister underwent genetic testing and was found to be BRCA negative. He states that same sister also had a benign pancreatic tumor removed.  His other sister was diagnosed with breast cancer at age 38, s/p chemo and surgery and tested positive for BRCA gene. He denies any other family history of malignancies. \par \par States he had a colonoscopy in 2/2017 and had 3 polyps excised. He also reports a history of H. Pylori for which he was treated for and most recent endoscopy showed gastritis but no H. Pylori. \par \par He underwent a screening colonoscopy with Dr. Kate Irby on 1/3/19 where he was found to have a descending colon polyp, cecal polyp and a descending polyp with benign pathology. Cecal and terminal biopsy was also benign. \par \par Today he is with c/o mild/dull, 4/10, intermittent right upper quadrant abdominal pain since 2016. The pain is worse with heavy and fatty meals.  He also repots early satiety, 4 lb weight loss, occasional abdominal bloating. Denies changes in appetite or bowel habits. Denies nausea or vomiting. States he recently retired and his sleeping patterns have been thrown off. He states he now sleeps during the day and stays up a lot of the night.

## 2019-04-23 ENCOUNTER — OUTPATIENT (OUTPATIENT)
Dept: OUTPATIENT SERVICES | Facility: HOSPITAL | Age: 59
LOS: 1 days | End: 2019-04-23

## 2019-04-23 VITALS
RESPIRATION RATE: 16 BRPM | DIASTOLIC BLOOD PRESSURE: 64 MMHG | WEIGHT: 117.95 LBS | SYSTOLIC BLOOD PRESSURE: 102 MMHG | HEART RATE: 69 BPM | TEMPERATURE: 97 F | HEIGHT: 66 IN

## 2019-04-23 DIAGNOSIS — E11.9 TYPE 2 DIABETES MELLITUS WITHOUT COMPLICATIONS: ICD-10-CM

## 2019-04-23 DIAGNOSIS — Z98.890 OTHER SPECIFIED POSTPROCEDURAL STATES: Chronic | ICD-10-CM

## 2019-04-23 DIAGNOSIS — C18.1 MALIGNANT NEOPLASM OF APPENDIX: ICD-10-CM

## 2019-04-23 DIAGNOSIS — Z20.1 CONTACT WITH AND (SUSPECTED) EXPOSURE TO TUBERCULOSIS: ICD-10-CM

## 2019-04-23 DIAGNOSIS — C80.1 MALIGNANT (PRIMARY) NEOPLASM, UNSPECIFIED: ICD-10-CM

## 2019-04-23 RX ORDER — METFORMIN HYDROCHLORIDE 850 MG/1
1 TABLET ORAL
Qty: 0 | Refills: 0 | COMMUNITY

## 2019-04-23 RX ORDER — LEVOTHYROXINE SODIUM 125 MCG
1 TABLET ORAL
Qty: 0 | Refills: 0 | COMMUNITY

## 2019-04-23 RX ORDER — SUCRALFATE 1 G
2 TABLET ORAL
Qty: 0 | Refills: 0 | COMMUNITY

## 2019-04-23 NOTE — H&P PST ADULT - CONSTITUTIONAL DETAILS
well-nourished/well-groomed/well-developed/no distress pt is thin/well-nourished/well-groomed/no distress

## 2019-04-23 NOTE — H&P PST ADULT - GIT GEN HX ROS MEA POS PC
No retinal holes or tears seen on exam. Recommended OBSERVATION. We reviewed the signs and symptoms of retinal tear/retinal detachment and the importance of prompt evaluation should there be increasing floaters, new flashing lights, or decreasing peripheral vision in either eye at any time. Patient understands condition, prognosis and need for follow up care. abdominal pain

## 2019-04-23 NOTE — H&P PST ADULT - MUSCULOSKELETAL
detailed exam no joint swelling/normal strength/no joint warmth/no calf tenderness/no joint erythema/ROM intact details…

## 2019-04-23 NOTE — H&P PST ADULT - HISTORY OF PRESENT ILLNESS
58 year old male with hx of gallstones with c/o right abdominal pain, had u/s which found fluid in abdomin. Pt had f/u CT scan which revealed "mucocele of appendix with associated malignant pseudomyxoma peritonei" Pt s/p  Exploratory Laparotomy, Right Colectomy , Splenectomy   on 2/22/19.  Pt f/u with surgeon pt s/p Barium test ; pt now presents for Reversal of Ileostomy 58 year old male with hx of gallstones with c/o right abdominal pain, had u/s which found fluid in abdomin. Pt had f/u CT scan which revealed "mucocele of appendix with associated malignant pseudomyxoma peritonei" Pt s/p  Exploratory Laparotomy, Right Hemicolectomy ,Ileostomy ,  Splenectomy   on 2/22/19.  Pt f/u with surgeon pt s/p Barium test ; pt now presents for Reversal of Ileostomy

## 2019-04-23 NOTE — H&P PST ADULT - NSICDXFAMILYHX_GEN_ALL_CORE_FT
FAMILY HISTORY:  Mother  Still living? Unknown  Family history of ovarian cancer, Age at diagnosis: Age Unknown

## 2019-04-23 NOTE — H&P PST ADULT - NEGATIVE NEUROLOGICAL SYMPTOMS
no weakness/no generalized seizures/no paresthesias/no focal seizures/no difficulty walking/no headache/no syncope

## 2019-04-23 NOTE — H&P PST ADULT - NSICDXPASTSURGICALHX_GEN_ALL_CORE_FT
PAST SURGICAL HISTORY:  H/O exploratory laparotomy Splenectomy ; 2/19    H/O thyroidectomy     S/P right knee arthroscopy 1996 PAST SURGICAL HISTORY:  H/O exploratory laparotomy Right hemicolectomy , Ileostomy , Splenectomy ; 2/19    H/O thyroidectomy     S/P right knee arthroscopy 1996

## 2019-04-23 NOTE — H&P PST ADULT - NSICDXPASTMEDICALHX_GEN_ALL_CORE_FT
PAST MEDICAL HISTORY:  Diabetes mellitus     Gallstones     Gastritis     Hyperlipidemia     Hypertension pt states  losartan dc d 2 months ago    Thyroid nodule benign s/p Thyroidectomy    Tuberculosis exposure 1996 - treated with medicaiton 10 months

## 2019-04-23 NOTE — H&P PST ADULT - NSANTHOSAYNRD_GEN_A_CORE
No. CHRISTOPHER screening performed.  STOP BANG Legend: 0-2 = LOW Risk; 3-4 = INTERMEDIATE Risk; 5-8 = HIGH Risk/resides alone

## 2019-04-23 NOTE — H&P PST ADULT - NSICDXPROBLEM_GEN_ALL_CORE_FT
PROBLEM DIAGNOSES  Problem: Malignant neoplasm  Assessment and Plan: Closure of Ileostomy  Pre op instructions reviewed with pt ; pt appears to have a good understanding of pre op instructions  Pt to Dr Colon for pre op medical evaluation 4/22/19  s/w Lory ; CXR, EKG, CBC, CMP, PTPTT, HGBA1C done ; results to be faxed     Problem: Tuberculosis exposure  Assessment and Plan: tx 1990x   cxr done 4/22/19 PROBLEM DIAGNOSES  Problem: Malignant neoplasm  Assessment and Plan: Closure of Ileostomy  Pre op instructions reviewed with pt ; pt appears to have a good understanding of pre op instructions  Pt to Dr Colon for pre op medical evaluation 4/22/19  s/w Lory ; CXR, EKG, CBC, CMP, PTPTT, HGBA1C done ; results to be faxed     Problem: Tuberculosis exposure  Assessment and Plan: tx 1990x   cxr done 4/22/19    Problem: Diabetes mellitus  Assessment and Plan: HGBA1C, BMP done 4/22/19 @ pcp office   Pt to hold metformin evening prior to surgery  OR booking notified via fax  FS dos

## 2019-04-24 ENCOUNTER — TRANSCRIPTION ENCOUNTER (OUTPATIENT)
Age: 59
End: 2019-04-24

## 2019-04-24 PROBLEM — I10 ESSENTIAL (PRIMARY) HYPERTENSION: Chronic | Status: ACTIVE | Noted: 2019-02-08

## 2019-04-24 PROBLEM — E04.1 NONTOXIC SINGLE THYROID NODULE: Chronic | Status: ACTIVE | Noted: 2019-02-08

## 2019-04-24 NOTE — ASU PATIENT PROFILE, ADULT - PSH
H/O exploratory laparotomy  Right hemicolectomy , Ileostomy , Splenectomy ; 2/19  H/O thyroidectomy    S/P right knee arthroscopy  1996

## 2019-04-24 NOTE — ASU PATIENT PROFILE, ADULT - PMH
Diabetes mellitus    Gallstones    Gastritis    Hyperlipidemia    Hypertension  pt states  losartan dc d 2 months ago  Thyroid nodule  benign s/p Thyroidectomy  Tuberculosis exposure  1996 - treated with medicaiton 10 months

## 2019-04-25 ENCOUNTER — INPATIENT (INPATIENT)
Facility: HOSPITAL | Age: 59
LOS: 3 days | Discharge: HOME | End: 2019-04-29
Attending: SURGERY | Admitting: SURGERY
Payer: COMMERCIAL

## 2019-04-25 ENCOUNTER — RESULT REVIEW (OUTPATIENT)
Age: 59
End: 2019-04-25

## 2019-04-25 ENCOUNTER — APPOINTMENT (OUTPATIENT)
Dept: SURGICAL ONCOLOGY | Facility: HOSPITAL | Age: 59
End: 2019-04-25

## 2019-04-25 VITALS
HEART RATE: 97 BPM | OXYGEN SATURATION: 99 % | WEIGHT: 117.95 LBS | HEIGHT: 66 IN | RESPIRATION RATE: 15 BRPM | SYSTOLIC BLOOD PRESSURE: 103 MMHG | DIASTOLIC BLOOD PRESSURE: 61 MMHG | TEMPERATURE: 98 F

## 2019-04-25 DIAGNOSIS — C18.1 MALIGNANT NEOPLASM OF APPENDIX: ICD-10-CM

## 2019-04-25 DIAGNOSIS — Z98.890 OTHER SPECIFIED POSTPROCEDURAL STATES: Chronic | ICD-10-CM

## 2019-04-25 PROCEDURE — 44620 REPAIR BOWEL OPENING: CPT | Mod: 58

## 2019-04-25 PROCEDURE — 88304 TISSUE EXAM BY PATHOLOGIST: CPT | Mod: 26

## 2019-04-25 RX ORDER — INSULIN LISPRO 100/ML
VIAL (ML) SUBCUTANEOUS AT BEDTIME
Qty: 0 | Refills: 0 | Status: DISCONTINUED | OUTPATIENT
Start: 2019-04-25 | End: 2019-04-28

## 2019-04-25 RX ORDER — SODIUM CHLORIDE 9 MG/ML
1000 INJECTION, SOLUTION INTRAVENOUS
Qty: 0 | Refills: 0 | Status: DISCONTINUED | OUTPATIENT
Start: 2019-04-25 | End: 2019-04-25

## 2019-04-25 RX ORDER — ONDANSETRON 8 MG/1
4 TABLET, FILM COATED ORAL ONCE
Qty: 0 | Refills: 0 | Status: DISCONTINUED | OUTPATIENT
Start: 2019-04-25 | End: 2019-04-25

## 2019-04-25 RX ORDER — ATORVASTATIN CALCIUM 80 MG/1
20 TABLET, FILM COATED ORAL AT BEDTIME
Qty: 0 | Refills: 0 | Status: DISCONTINUED | OUTPATIENT
Start: 2019-04-25 | End: 2019-04-29

## 2019-04-25 RX ORDER — ACETAMINOPHEN 500 MG
650 TABLET ORAL EVERY 6 HOURS
Qty: 0 | Refills: 0 | Status: DISCONTINUED | OUTPATIENT
Start: 2019-04-25 | End: 2019-04-29

## 2019-04-25 RX ORDER — FENTANYL CITRATE 50 UG/ML
25 INJECTION INTRAVENOUS
Qty: 0 | Refills: 0 | Status: DISCONTINUED | OUTPATIENT
Start: 2019-04-25 | End: 2019-04-25

## 2019-04-25 RX ORDER — SODIUM CHLORIDE 9 MG/ML
1000 INJECTION, SOLUTION INTRAVENOUS
Qty: 0 | Refills: 0 | Status: DISCONTINUED | OUTPATIENT
Start: 2019-04-25 | End: 2019-04-26

## 2019-04-25 RX ORDER — FENTANYL CITRATE 50 UG/ML
50 INJECTION INTRAVENOUS
Qty: 0 | Refills: 0 | Status: DISCONTINUED | OUTPATIENT
Start: 2019-04-25 | End: 2019-04-25

## 2019-04-25 RX ORDER — ENOXAPARIN SODIUM 100 MG/ML
40 INJECTION SUBCUTANEOUS DAILY
Qty: 0 | Refills: 0 | Status: DISCONTINUED | OUTPATIENT
Start: 2019-04-25 | End: 2019-04-29

## 2019-04-25 RX ORDER — LEVOTHYROXINE SODIUM 125 MCG
112 TABLET ORAL DAILY
Qty: 0 | Refills: 0 | Status: DISCONTINUED | OUTPATIENT
Start: 2019-04-25 | End: 2019-04-29

## 2019-04-25 RX ORDER — INSULIN LISPRO 100/ML
VIAL (ML) SUBCUTANEOUS
Qty: 0 | Refills: 0 | Status: DISCONTINUED | OUTPATIENT
Start: 2019-04-25 | End: 2019-04-28

## 2019-04-25 RX ORDER — OXYCODONE HYDROCHLORIDE 5 MG/1
5 TABLET ORAL EVERY 4 HOURS
Qty: 0 | Refills: 0 | Status: DISCONTINUED | OUTPATIENT
Start: 2019-04-25 | End: 2019-04-29

## 2019-04-25 RX ADMIN — ATORVASTATIN CALCIUM 20 MILLIGRAM(S): 80 TABLET, FILM COATED ORAL at 21:55

## 2019-04-25 RX ADMIN — FENTANYL CITRATE 50 MICROGRAM(S): 50 INJECTION INTRAVENOUS at 16:00

## 2019-04-25 RX ADMIN — FENTANYL CITRATE 50 MICROGRAM(S): 50 INJECTION INTRAVENOUS at 15:47

## 2019-04-25 RX ADMIN — FENTANYL CITRATE 50 MICROGRAM(S): 50 INJECTION INTRAVENOUS at 15:25

## 2019-04-25 RX ADMIN — SODIUM CHLORIDE 50 MILLILITER(S): 9 INJECTION, SOLUTION INTRAVENOUS at 09:48

## 2019-04-25 RX ADMIN — OXYCODONE HYDROCHLORIDE 5 MILLIGRAM(S): 5 TABLET ORAL at 21:55

## 2019-04-25 RX ADMIN — OXYCODONE HYDROCHLORIDE 5 MILLIGRAM(S): 5 TABLET ORAL at 22:50

## 2019-04-25 RX ADMIN — FENTANYL CITRATE 50 MICROGRAM(S): 50 INJECTION INTRAVENOUS at 15:45

## 2019-04-25 NOTE — PROVIDER CONTACT NOTE (OTHER) - ACTION/TREATMENT ORDERED:
IV solution changed to D5/.45%NS @ 50cch/h as ordered. Pt remains asymptomatic.  Will repeat FS in 30 minutes IV solution changed to D5/.45%NS @ 50cch/h as ordered. Pt remains asymptomatic.  Will repeat FS in 30 minutes  10:20 FS=66. Dr Neri notified. Pt remains asymptomatic. Will continue to monitor. IV solution changed to D5/.45%NS @ 50cch/h as ordered. Pt remains asymptomatic.  Will repeat FS in 30 minutes  10:20 FS=66. Dr Neri notified. Pt remains asymptomatic. 11:10 FS=89 Dr Neri aware

## 2019-04-25 NOTE — ASU PREOP CHECKLIST - BMI (KG/M2)
Telephone Encounter by Conchis Andujar at 01/30/17 09:43 AM     Author:  Conchis Andujar Service:  (none) Author Type:  Ordering User     Filed:  01/30/17 09:44 AM Encounter Date:  1/20/2017 Status:  Signed     :  Conchis Andujar (Patient )            Orders are only good for a year from the day it was placed .[MM1.1M]       Revision History        User Key Date/Time User Provider Type Action    > MM1.1 01/30/17 09:44 AM Conchis Andujar Patient  Sign    M - Manual             19

## 2019-04-25 NOTE — BRIEF OPERATIVE NOTE - OPERATION/FINDINGS
Procedure: Closure of ileostomy    Findings: The ileostomy was mobilized. The two ends were then anastomosed. Fascia and skin were closed.

## 2019-04-25 NOTE — CHART NOTE - NSCHARTNOTEFT_GEN_A_CORE
S: Patient underwent ileostomy reversal and tolerated procedure without issue. Examined on floors. AF/VSS. Patient denies chest pain, shortness of breath, nausea, vomiting, lightheadedness, or dizziness.  Pain was well controlled, only focal pain at incision site. Family at bedside. Tolerating clears.     O:T(C): 36.3 (04-25-19 @ 19:35), Max: 36.5 (04-25-19 @ 18:30)  HR: 83 (04-25-19 @ 19:35) (82 - 88)  BP: 116/71 (04-25-19 @ 19:35) (113/75 - 134/81)  RR: 16 (04-25-19 @ 19:35) (13 - 20)  SpO2: 100% (04-25-19 @ 19:35) (98% - 100%)  Wt(kg): --             Gen: NAD  Abd: Soft, appropriately tender at incision, non distended, no rebounding or guarding. Dressing w/ minimal strikethrough.         Assessment/Plan:  58y Male s/p Closure, ileostomy    Pain control  CLD  DVT PPX  Out of bed and encourage early ambulation  Incentive spirometry. S: Patient underwent ileostomy reversal and tolerated procedure without issue. Examined on floors. AF/VSS. Patient denies chest pain, shortness of breath, nausea, vomiting, lightheadedness, or dizziness.  Pain was well controlled, only focal pain at incision site. Family at bedside. Tolerating clears.     O:T(C): 36.3 (04-25-19 @ 19:35), Max: 36.5 (04-25-19 @ 18:30)  HR: 83 (04-25-19 @ 19:35) (82 - 88)  BP: 116/71 (04-25-19 @ 19:35) (113/75 - 134/81)  RR: 16 (04-25-19 @ 19:35) (13 - 20)  SpO2: 100% (04-25-19 @ 19:35) (98% - 100%)  Wt(kg): --             Gen: NAD  Abd: Soft, appropriately tender at incision, non distended, no rebounding or guarding. Dressing w/ minimal strikethrough.         Assessment/Plan:  58y Male s/p ileostomy reversal    Pain control  CLD  DVT PPX  Out of bed and encourage early ambulation  Incentive spirometry.

## 2019-04-25 NOTE — ASU PREOP CHECKLIST - 1.
9am FS=60, repeat FS=62 Pt asymptomatic.  Dr Neri , Dr Ulloa and AMINATA Rangel aware. IV solution changed to D5/.45NS @ 50cc/h as ordered.

## 2019-04-26 LAB
ANION GAP SERPL CALC-SCNC: 12 MMO/L — SIGNIFICANT CHANGE UP (ref 7–14)
BUN SERPL-MCNC: 12 MG/DL — SIGNIFICANT CHANGE UP (ref 7–23)
CALCIUM SERPL-MCNC: 9.4 MG/DL — SIGNIFICANT CHANGE UP (ref 8.4–10.5)
CHLORIDE SERPL-SCNC: 103 MMOL/L — SIGNIFICANT CHANGE UP (ref 98–107)
CO2 SERPL-SCNC: 24 MMOL/L — SIGNIFICANT CHANGE UP (ref 22–31)
CREAT SERPL-MCNC: 0.56 MG/DL — SIGNIFICANT CHANGE UP (ref 0.5–1.3)
GLUCOSE SERPL-MCNC: 98 MG/DL — SIGNIFICANT CHANGE UP (ref 70–99)
HCT VFR BLD CALC: 30 % — LOW (ref 39–50)
HGB BLD-MCNC: 9.6 G/DL — LOW (ref 13–17)
MCHC RBC-ENTMCNC: 27.7 PG — SIGNIFICANT CHANGE UP (ref 27–34)
MCHC RBC-ENTMCNC: 32 % — SIGNIFICANT CHANGE UP (ref 32–36)
MCV RBC AUTO: 86.7 FL — SIGNIFICANT CHANGE UP (ref 80–100)
NRBC # FLD: 0 K/UL — SIGNIFICANT CHANGE UP (ref 0–0)
PLATELET # BLD AUTO: 351 K/UL — SIGNIFICANT CHANGE UP (ref 150–400)
PMV BLD: 10.5 FL — SIGNIFICANT CHANGE UP (ref 7–13)
POTASSIUM SERPL-MCNC: 3.9 MMOL/L — SIGNIFICANT CHANGE UP (ref 3.5–5.3)
POTASSIUM SERPL-SCNC: 3.9 MMOL/L — SIGNIFICANT CHANGE UP (ref 3.5–5.3)
RBC # BLD: 3.46 M/UL — LOW (ref 4.2–5.8)
RBC # FLD: 19.4 % — HIGH (ref 10.3–14.5)
SODIUM SERPL-SCNC: 139 MMOL/L — SIGNIFICANT CHANGE UP (ref 135–145)
WBC # BLD: 13.5 K/UL — HIGH (ref 3.8–10.5)
WBC # FLD AUTO: 13.5 K/UL — HIGH (ref 3.8–10.5)

## 2019-04-26 RX ORDER — CALCIUM CARBONATE 500(1250)
1 TABLET ORAL EVERY 4 HOURS
Qty: 0 | Refills: 0 | Status: DISCONTINUED | OUTPATIENT
Start: 2019-04-26 | End: 2019-04-29

## 2019-04-26 RX ORDER — SODIUM CHLORIDE 9 MG/ML
1000 INJECTION, SOLUTION INTRAVENOUS
Qty: 0 | Refills: 0 | Status: DISCONTINUED | OUTPATIENT
Start: 2019-04-26 | End: 2019-04-27

## 2019-04-26 RX ADMIN — OXYCODONE HYDROCHLORIDE 5 MILLIGRAM(S): 5 TABLET ORAL at 21:00

## 2019-04-26 RX ADMIN — Medication 112 MICROGRAM(S): at 05:12

## 2019-04-26 RX ADMIN — SODIUM CHLORIDE 50 MILLILITER(S): 9 INJECTION, SOLUTION INTRAVENOUS at 08:44

## 2019-04-26 RX ADMIN — OXYCODONE HYDROCHLORIDE 5 MILLIGRAM(S): 5 TABLET ORAL at 17:22

## 2019-04-26 RX ADMIN — ATORVASTATIN CALCIUM 20 MILLIGRAM(S): 80 TABLET, FILM COATED ORAL at 21:00

## 2019-04-26 RX ADMIN — OXYCODONE HYDROCHLORIDE 5 MILLIGRAM(S): 5 TABLET ORAL at 16:39

## 2019-04-26 RX ADMIN — OXYCODONE HYDROCHLORIDE 5 MILLIGRAM(S): 5 TABLET ORAL at 10:45

## 2019-04-26 RX ADMIN — SODIUM CHLORIDE 50 MILLILITER(S): 9 INJECTION, SOLUTION INTRAVENOUS at 19:20

## 2019-04-26 RX ADMIN — OXYCODONE HYDROCHLORIDE 5 MILLIGRAM(S): 5 TABLET ORAL at 21:30

## 2019-04-26 RX ADMIN — Medication 1 TABLET(S): at 08:44

## 2019-04-26 RX ADMIN — ENOXAPARIN SODIUM 40 MILLIGRAM(S): 100 INJECTION SUBCUTANEOUS at 11:39

## 2019-04-26 RX ADMIN — OXYCODONE HYDROCHLORIDE 5 MILLIGRAM(S): 5 TABLET ORAL at 05:13

## 2019-04-26 RX ADMIN — OXYCODONE HYDROCHLORIDE 5 MILLIGRAM(S): 5 TABLET ORAL at 09:54

## 2019-04-26 RX ADMIN — OXYCODONE HYDROCHLORIDE 5 MILLIGRAM(S): 5 TABLET ORAL at 06:10

## 2019-04-26 NOTE — PROGRESS NOTE ADULT - ASSESSMENT
58M s/p ileostomy closure (4/25)    - pain control  - CLD  - sliding scale  - DVT ppx    D Team  [id number]

## 2019-04-26 NOTE — PROGRESS NOTE ADULT - SUBJECTIVE AND OBJECTIVE BOX
Surgery Progress Note      Subjective: Patient seen and examined. No acute events overnight.   + flatus / - BM    T(C): 36.6 (04-26-19 @ 07:51), Max: 37.1 (04-26-19 @ 00:16)  HR: 77 (04-26-19 @ 07:51) (69 - 91)  BP: 104/60 (04-26-19 @ 07:51) (102/60 - 136/76)  RR: 18 (04-26-19 @ 07:51) (7 - 20)  SpO2: 100% (04-26-19 @ 07:51) (98% - 100%)      04-25-19 @ 07:01  -  04-26-19 @ 07:00  --------------------------------------------------------  IN: 640 mL / OUT: 990 mL / NET: -350 mL        Physical Exam:   General: NAD  Abdomen: soft, appropriately tender, incision c/d/i    Labs:                          9.6    13.50 )-----------( 351      ( 26 Apr 2019 06:00 )             30.0     04-26    139  |  103  |  12  ----------------------------<  98  3.9   |  24  |  0.56    Ca    9.4      26 Apr 2019 06:00        Medications:     acetaminophen   Tablet .. 650 milliGRAM(s) Oral every 6 hours PRN  atorvastatin 20 milliGRAM(s) Oral at bedtime  calcium carbonate    500 mG (Tums) Chewable 1 Tablet(s) Chew every 4 hours PRN  dextrose 5% + sodium chloride 0.45%. 1000 milliLiter(s) IV Continuous <Continuous>  enoxaparin Injectable 40 milliGRAM(s) SubCutaneous daily  insulin lispro (HumaLOG) corrective regimen sliding scale   SubCutaneous three times a day before meals  insulin lispro (HumaLOG) corrective regimen sliding scale   SubCutaneous at bedtime  levothyroxine 112 MICROGram(s) Oral daily  oxyCODONE    IR 5 milliGRAM(s) Oral every 4 hours PRN      Radiographs: No new imaging

## 2019-04-26 NOTE — PROGRESS NOTE ADULT - SUBJECTIVE AND OBJECTIVE BOX
ANESTHESIA POSTOP CHECK    58y Male POSTOP DAY 1     No COMPLAINTS    NO APPARENT ANESTHESIA COMPLICATIONS

## 2019-04-27 LAB
ANION GAP SERPL CALC-SCNC: 11 MMO/L — SIGNIFICANT CHANGE UP (ref 7–14)
BUN SERPL-MCNC: 7 MG/DL — SIGNIFICANT CHANGE UP (ref 7–23)
CALCIUM SERPL-MCNC: 8.9 MG/DL — SIGNIFICANT CHANGE UP (ref 8.4–10.5)
CHLORIDE SERPL-SCNC: 106 MMOL/L — SIGNIFICANT CHANGE UP (ref 98–107)
CO2 SERPL-SCNC: 25 MMOL/L — SIGNIFICANT CHANGE UP (ref 22–31)
CREAT SERPL-MCNC: 0.62 MG/DL — SIGNIFICANT CHANGE UP (ref 0.5–1.3)
GLUCOSE SERPL-MCNC: 84 MG/DL — SIGNIFICANT CHANGE UP (ref 70–99)
HCT VFR BLD CALC: 30.1 % — LOW (ref 39–50)
HGB BLD-MCNC: 9.6 G/DL — LOW (ref 13–17)
MAGNESIUM SERPL-MCNC: 1.4 MG/DL — LOW (ref 1.6–2.6)
MCHC RBC-ENTMCNC: 27.8 PG — SIGNIFICANT CHANGE UP (ref 27–34)
MCHC RBC-ENTMCNC: 31.9 % — LOW (ref 32–36)
MCV RBC AUTO: 87.2 FL — SIGNIFICANT CHANGE UP (ref 80–100)
NRBC # FLD: 0 K/UL — SIGNIFICANT CHANGE UP (ref 0–0)
PHOSPHATE SERPL-MCNC: 3.4 MG/DL — SIGNIFICANT CHANGE UP (ref 2.5–4.5)
PLATELET # BLD AUTO: 350 K/UL — SIGNIFICANT CHANGE UP (ref 150–400)
PMV BLD: 10.6 FL — SIGNIFICANT CHANGE UP (ref 7–13)
POTASSIUM SERPL-MCNC: 3.2 MMOL/L — LOW (ref 3.5–5.3)
POTASSIUM SERPL-SCNC: 3.2 MMOL/L — LOW (ref 3.5–5.3)
RBC # BLD: 3.45 M/UL — LOW (ref 4.2–5.8)
RBC # FLD: 19.6 % — HIGH (ref 10.3–14.5)
SODIUM SERPL-SCNC: 142 MMOL/L — SIGNIFICANT CHANGE UP (ref 135–145)
WBC # BLD: 9.77 K/UL — SIGNIFICANT CHANGE UP (ref 3.8–10.5)
WBC # FLD AUTO: 9.77 K/UL — SIGNIFICANT CHANGE UP (ref 3.8–10.5)

## 2019-04-27 RX ORDER — DEXTROSE 50 % IN WATER 50 %
12.5 SYRINGE (ML) INTRAVENOUS ONCE
Qty: 0 | Refills: 0 | Status: COMPLETED | OUTPATIENT
Start: 2019-04-27 | End: 2019-04-27

## 2019-04-27 RX ORDER — POTASSIUM CHLORIDE 20 MEQ
40 PACKET (EA) ORAL ONCE
Qty: 0 | Refills: 0 | Status: COMPLETED | OUTPATIENT
Start: 2019-04-27 | End: 2019-04-27

## 2019-04-27 RX ADMIN — OXYCODONE HYDROCHLORIDE 5 MILLIGRAM(S): 5 TABLET ORAL at 09:50

## 2019-04-27 RX ADMIN — ENOXAPARIN SODIUM 40 MILLIGRAM(S): 100 INJECTION SUBCUTANEOUS at 11:21

## 2019-04-27 RX ADMIN — Medication 12.5 GRAM(S): at 13:39

## 2019-04-27 RX ADMIN — Medication 112 MICROGRAM(S): at 05:19

## 2019-04-27 RX ADMIN — OXYCODONE HYDROCHLORIDE 5 MILLIGRAM(S): 5 TABLET ORAL at 08:57

## 2019-04-27 RX ADMIN — Medication 40 MILLIEQUIVALENT(S): at 11:20

## 2019-04-27 RX ADMIN — ATORVASTATIN CALCIUM 20 MILLIGRAM(S): 80 TABLET, FILM COATED ORAL at 21:15

## 2019-04-27 NOTE — PROVIDER CONTACT NOTE (OTHER) - ASSESSMENT
Dr Neri and Dr Ulloa and D team AMINATA Rangel notified
Patient stable, asymptomatic.
asymptomatic, vs stable

## 2019-04-27 NOTE — PROVIDER CONTACT NOTE (OTHER) - BACKGROUND
Pt s/p reverse ileostomy 4/25, Pt has been on full fluid diet with normoglycemia and mild hyperglycemia

## 2019-04-27 NOTE — PROGRESS NOTE ADULT - ASSESSMENT
58M s/p ileostomy closure (4/25)    - pain control  - FLD  - sliding scale  - DVT ppx    D Team  [id number]

## 2019-04-27 NOTE — PROGRESS NOTE ADULT - SUBJECTIVE AND OBJECTIVE BOX
Surgery Progress Note      Subjective: Patient seen and examined. No acute events overnight.   + flatus    T(C): 36.6 (04-27-19 @ 00:24), Max: 36.6 (04-26-19 @ 07:51)  HR: 80 (04-27-19 @ 00:24) (55 - 80)  BP: 100/53 (04-27-19 @ 00:24) (100/53 - 104/60)  RR: 19 (04-27-19 @ 00:24) (16 - 19)  SpO2: 98% (04-27-19 @ 00:24) (97% - 100%)      04-25-19 @ 07:01  -  04-26-19 @ 07:00  --------------------------------------------------------  IN: 640 mL / OUT: 990 mL / NET: -350 mL    04-26-19 @ 07:01  -  04-27-19 @ 01:24  --------------------------------------------------------  IN: 250 mL / OUT: 200 mL / NET: 50 mL        Physical Exam:     General: NAD  Abdomen: soft, appropriately tender, incision c/d/i    Labs:                          9.6    13.50 )-----------( 351      ( 26 Apr 2019 06:00 )             30.0     04-26    139  |  103  |  12  ----------------------------<  98  3.9   |  24  |  0.56    Ca    9.4      26 Apr 2019 06:00        Medications:     acetaminophen   Tablet .. 650 milliGRAM(s) Oral every 6 hours PRN  atorvastatin 20 milliGRAM(s) Oral at bedtime  calcium carbonate    500 mG (Tums) Chewable 1 Tablet(s) Chew every 4 hours PRN  dextrose 5% + sodium chloride 0.45%. 1000 milliLiter(s) IV Continuous <Continuous>  enoxaparin Injectable 40 milliGRAM(s) SubCutaneous daily  insulin lispro (HumaLOG) corrective regimen sliding scale   SubCutaneous three times a day before meals  insulin lispro (HumaLOG) corrective regimen sliding scale   SubCutaneous at bedtime  levothyroxine 112 MICROGram(s) Oral daily  oxyCODONE    IR 5 milliGRAM(s) Oral every 4 hours PRN      Radiographs: No new imaging

## 2019-04-28 LAB
ANION GAP SERPL CALC-SCNC: 11 MMO/L — SIGNIFICANT CHANGE UP (ref 7–14)
BUN SERPL-MCNC: 6 MG/DL — LOW (ref 7–23)
CALCIUM SERPL-MCNC: 8.5 MG/DL — SIGNIFICANT CHANGE UP (ref 8.4–10.5)
CHLORIDE SERPL-SCNC: 106 MMOL/L — SIGNIFICANT CHANGE UP (ref 98–107)
CO2 SERPL-SCNC: 25 MMOL/L — SIGNIFICANT CHANGE UP (ref 22–31)
CREAT SERPL-MCNC: 0.57 MG/DL — SIGNIFICANT CHANGE UP (ref 0.5–1.3)
GLUCOSE SERPL-MCNC: 82 MG/DL — SIGNIFICANT CHANGE UP (ref 70–99)
HCT VFR BLD CALC: 29.2 % — LOW (ref 39–50)
HGB BLD-MCNC: 9.4 G/DL — LOW (ref 13–17)
MAGNESIUM SERPL-MCNC: 1.4 MG/DL — LOW (ref 1.6–2.6)
MCHC RBC-ENTMCNC: 27.1 PG — SIGNIFICANT CHANGE UP (ref 27–34)
MCHC RBC-ENTMCNC: 32.2 % — SIGNIFICANT CHANGE UP (ref 32–36)
MCV RBC AUTO: 84.1 FL — SIGNIFICANT CHANGE UP (ref 80–100)
NRBC # FLD: 0 K/UL — SIGNIFICANT CHANGE UP (ref 0–0)
PHOSPHATE SERPL-MCNC: 3.3 MG/DL — SIGNIFICANT CHANGE UP (ref 2.5–4.5)
PLATELET # BLD AUTO: 357 K/UL — SIGNIFICANT CHANGE UP (ref 150–400)
PMV BLD: 10.6 FL — SIGNIFICANT CHANGE UP (ref 7–13)
POTASSIUM SERPL-MCNC: 3.4 MMOL/L — LOW (ref 3.5–5.3)
POTASSIUM SERPL-SCNC: 3.4 MMOL/L — LOW (ref 3.5–5.3)
RBC # BLD: 3.47 M/UL — LOW (ref 4.2–5.8)
RBC # FLD: 19.2 % — HIGH (ref 10.3–14.5)
SODIUM SERPL-SCNC: 142 MMOL/L — SIGNIFICANT CHANGE UP (ref 135–145)
WBC # BLD: 10.09 K/UL — SIGNIFICANT CHANGE UP (ref 3.8–10.5)
WBC # FLD AUTO: 10.09 K/UL — SIGNIFICANT CHANGE UP (ref 3.8–10.5)

## 2019-04-28 RX ORDER — POTASSIUM CHLORIDE 20 MEQ
40 PACKET (EA) ORAL ONCE
Qty: 0 | Refills: 0 | Status: COMPLETED | OUTPATIENT
Start: 2019-04-28 | End: 2019-04-28

## 2019-04-28 RX ORDER — MAGNESIUM SULFATE 500 MG/ML
1 VIAL (ML) INJECTION ONCE
Qty: 0 | Refills: 0 | Status: COMPLETED | OUTPATIENT
Start: 2019-04-28 | End: 2019-04-28

## 2019-04-28 RX ADMIN — OXYCODONE HYDROCHLORIDE 5 MILLIGRAM(S): 5 TABLET ORAL at 08:40

## 2019-04-28 RX ADMIN — ATORVASTATIN CALCIUM 20 MILLIGRAM(S): 80 TABLET, FILM COATED ORAL at 21:18

## 2019-04-28 RX ADMIN — Medication 100 GRAM(S): at 11:03

## 2019-04-28 RX ADMIN — ENOXAPARIN SODIUM 40 MILLIGRAM(S): 100 INJECTION SUBCUTANEOUS at 11:03

## 2019-04-28 RX ADMIN — OXYCODONE HYDROCHLORIDE 5 MILLIGRAM(S): 5 TABLET ORAL at 21:21

## 2019-04-28 RX ADMIN — OXYCODONE HYDROCHLORIDE 5 MILLIGRAM(S): 5 TABLET ORAL at 16:50

## 2019-04-28 RX ADMIN — Medication 112 MICROGRAM(S): at 05:04

## 2019-04-28 RX ADMIN — OXYCODONE HYDROCHLORIDE 5 MILLIGRAM(S): 5 TABLET ORAL at 15:57

## 2019-04-28 RX ADMIN — OXYCODONE HYDROCHLORIDE 5 MILLIGRAM(S): 5 TABLET ORAL at 22:20

## 2019-04-28 RX ADMIN — OXYCODONE HYDROCHLORIDE 5 MILLIGRAM(S): 5 TABLET ORAL at 07:40

## 2019-04-28 RX ADMIN — Medication 40 MILLIEQUIVALENT(S): at 11:04

## 2019-04-28 NOTE — PROGRESS NOTE ADULT - SUBJECTIVE AND OBJECTIVE BOX
Surgery Progress Note      Subjective: Patient seen and examined. No acute events overnight.   + flatus    T(C): 36.6 (04-27-19 @ 00:24), Max: 36.6 (04-26-19 @ 07:51)  HR: 80 (04-27-19 @ 00:24) (55 - 80)  BP: 100/53 (04-27-19 @ 00:24) (100/53 - 104/60)  RR: 19 (04-27-19 @ 00:24) (16 - 19)  SpO2: 98% (04-27-19 @ 00:24) (97% - 100%)      04-25-19 @ 07:01  -  04-26-19 @ 07:00  --------------------------------------------------------  IN: 640 mL / OUT: 990 mL / NET: -350 mL    04-26-19 @ 07:01  -  04-27-19 @ 01:24  --------------------------------------------------------  IN: 250 mL / OUT: 200 mL / NET: 50 mL        Physical Exam:     General: NAD  Abdomen: soft, appropriately tender, incision c/d/i    Labs:                          9.6    13.50 )-----------( 351      ( 26 Apr 2019 06:00 )             30.0     04-26    139  |  103  |  12  ----------------------------<  98  3.9   |  24  |  0.56    Ca    9.4      26 Apr 2019 06:00        Medications:     acetaminophen   Tablet .. 650 milliGRAM(s) Oral every 6 hours PRN  atorvastatin 20 milliGRAM(s) Oral at bedtime  calcium carbonate    500 mG (Tums) Chewable 1 Tablet(s) Chew every 4 hours PRN  dextrose 5% + sodium chloride 0.45%. 1000 milliLiter(s) IV Continuous <Continuous>  enoxaparin Injectable 40 milliGRAM(s) SubCutaneous daily  insulin lispro (HumaLOG) corrective regimen sliding scale   SubCutaneous three times a day before meals  insulin lispro (HumaLOG) corrective regimen sliding scale   SubCutaneous at bedtime  levothyroxine 112 MICROGram(s) Oral daily  oxyCODONE    IR 5 milliGRAM(s) Oral every 4 hours PRN      Radiographs: No new imaging Surgery Progress Note      Subjective: Patient seen and examined. No acute events overnight.   + flatus with decreased hiccups. advanced to regular diet yesterday and tolerating well.     T(C): 36.6 (04-27-19 @ 00:24), Max: 36.6 (04-26-19 @ 07:51)  HR: 80 (04-27-19 @ 00:24) (55 - 80)  BP: 100/53 (04-27-19 @ 00:24) (100/53 - 104/60)  RR: 19 (04-27-19 @ 00:24) (16 - 19)  SpO2: 98% (04-27-19 @ 00:24) (97% - 100%)      04-25-19 @ 07:01  -  04-26-19 @ 07:00  --------------------------------------------------------  IN: 640 mL / OUT: 990 mL / NET: -350 mL    04-26-19 @ 07:01  -  04-27-19 @ 01:24  --------------------------------------------------------  IN: 250 mL / OUT: 200 mL / NET: 50 mL        Physical Exam:     General: NAD  Abdomen: soft, appropriately tender, incision c/d/i    Labs:                          9.6    13.50 )-----------( 351      ( 26 Apr 2019 06:00 )             30.0     04-26    139  |  103  |  12  ----------------------------<  98  3.9   |  24  |  0.56    Ca    9.4      26 Apr 2019 06:00        Medications:     acetaminophen   Tablet .. 650 milliGRAM(s) Oral every 6 hours PRN  atorvastatin 20 milliGRAM(s) Oral at bedtime  calcium carbonate    500 mG (Tums) Chewable 1 Tablet(s) Chew every 4 hours PRN  dextrose 5% + sodium chloride 0.45%. 1000 milliLiter(s) IV Continuous <Continuous>  enoxaparin Injectable 40 milliGRAM(s) SubCutaneous daily  insulin lispro (HumaLOG) corrective regimen sliding scale   SubCutaneous three times a day before meals  insulin lispro (HumaLOG) corrective regimen sliding scale   SubCutaneous at bedtime  levothyroxine 112 MICROGram(s) Oral daily  oxyCODONE    IR 5 milliGRAM(s) Oral every 4 hours PRN      Radiographs: No new imaging

## 2019-04-28 NOTE — PROGRESS NOTE ADULT - ASSESSMENT
58M s/p ileostomy closure (4/25)    - pain control  - FLD  - sliding scale  - DVT ppx    D Team  [id number] 58M s/p ileostomy closure (4/25)    - pain control  - c/w regular diet  - lovenox for DVT ppx  - OOB and ambulating as tolerated  - d/c tomorrow    D Team  [id number]

## 2019-04-29 ENCOUNTER — TRANSCRIPTION ENCOUNTER (OUTPATIENT)
Age: 59
End: 2019-04-29

## 2019-04-29 VITALS
TEMPERATURE: 98 F | OXYGEN SATURATION: 100 % | HEART RATE: 77 BPM | RESPIRATION RATE: 16 BRPM | SYSTOLIC BLOOD PRESSURE: 90 MMHG | DIASTOLIC BLOOD PRESSURE: 52 MMHG

## 2019-04-29 RX ORDER — POTASSIUM CHLORIDE 20 MEQ
40 PACKET (EA) ORAL ONCE
Qty: 0 | Refills: 0 | Status: COMPLETED | OUTPATIENT
Start: 2019-04-29 | End: 2019-04-29

## 2019-04-29 RX ORDER — POTASSIUM CHLORIDE 20 MEQ
10 PACKET (EA) ORAL
Qty: 0 | Refills: 0 | Status: DISCONTINUED | OUTPATIENT
Start: 2019-04-29 | End: 2019-04-29

## 2019-04-29 RX ORDER — MAGNESIUM SULFATE 500 MG/ML
2 VIAL (ML) INJECTION ONCE
Qty: 0 | Refills: 0 | Status: COMPLETED | OUTPATIENT
Start: 2019-04-29 | End: 2019-04-29

## 2019-04-29 RX ORDER — OXYCODONE HYDROCHLORIDE 5 MG/1
1 TABLET ORAL
Qty: 12 | Refills: 0
Start: 2019-04-29 | End: 2019-05-01

## 2019-04-29 RX ORDER — LOPERAMIDE HCL 2 MG
1 TABLET ORAL
Qty: 24 | Refills: 0
Start: 2019-04-29 | End: 2019-05-04

## 2019-04-29 RX ADMIN — Medication 1 TABLET(S): at 11:45

## 2019-04-29 RX ADMIN — Medication 40 MILLIEQUIVALENT(S): at 11:44

## 2019-04-29 RX ADMIN — OXYCODONE HYDROCHLORIDE 5 MILLIGRAM(S): 5 TABLET ORAL at 06:00

## 2019-04-29 RX ADMIN — Medication 50 GRAM(S): at 11:44

## 2019-04-29 RX ADMIN — Medication 112 MICROGRAM(S): at 05:08

## 2019-04-29 RX ADMIN — OXYCODONE HYDROCHLORIDE 5 MILLIGRAM(S): 5 TABLET ORAL at 05:08

## 2019-04-29 RX ADMIN — ENOXAPARIN SODIUM 40 MILLIGRAM(S): 100 INJECTION SUBCUTANEOUS at 11:44

## 2019-04-29 NOTE — DISCHARGE NOTE PROVIDER - NSDCFUADDINST_GEN_ALL_CORE_FT
WOUND CARE:  Please keep incisions clean and dry. Please do not Scrub or rub incisions. Do not use lotion or powder on incisions.     BATHING: You may shower and/or sponge bathe. You may use warm soapy water in the shower and rinse, pat dry.    ACTIVITY: No heavy lifting or straining. Otherwise, you may return to your usual level of physical activity. If you are taking narcotic pain medication DO NOT drive a car, operate machinery or make important decisions.    DIET: Return to your usual diet.    NOTIFY YOUR SURGEON IF YOU HAVE: any bleeding that does not stop, any pus draining from your wound(s), any fever (over 100.4 F) persistent nausea/vomiting, or if your pain is not controlled on your discharge pain medications, unable to urinate.    Please follow up with your primary care physician in one week regarding your hospitalization, bring copies of your discharge paperwork.    Please follow up with your surgeon, Dr. Qureshi within 1-2 weeks of discharge call to make appointment. ([phone])

## 2019-04-29 NOTE — DISCHARGE NOTE PROVIDER - NSDCCPCAREPLAN_GEN_ALL_CORE_FT
PRINCIPAL DISCHARGE DIAGNOSIS  Diagnosis: S/P closure of ileostomy  Assessment and Plan of Treatment:

## 2019-04-29 NOTE — PROGRESS NOTE ADULT - SUBJECTIVE AND OBJECTIVE BOX
Surgery Progress Note    S: Patient seen and examined. No acute events overnight. tolerating diet without n/v. + flatus, + BM. pain is well controlled.     O:  Vital Signs Last 24 Hrs  T(C): 36.7 (29 Apr 2019 00:13), Max: 37.2 (28 Apr 2019 05:55)  T(F): 98.1 (29 Apr 2019 00:13), Max: 99 (28 Apr 2019 05:55)  HR: 69 (29 Apr 2019 00:13) (61 - 97)  BP: 101/56 (29 Apr 2019 00:13) (97/57 - 114/67)  BP(mean): --  RR: 18 (29 Apr 2019 00:13) (16 - 18)  SpO2: 100% (29 Apr 2019 00:13) (98% - 100%)    I&O's Detail    27 Apr 2019 07:01  -  28 Apr 2019 07:00  --------------------------------------------------------  IN:    dextrose 5% + sodium chloride 0.45%.: 150 mL    Oral Fluid: 500 mL  Total IN: 650 mL    OUT:    Voided: 575 mL  Total OUT: 575 mL    Total NET: 75 mL      28 Apr 2019 07:01  -  29 Apr 2019 00:24  --------------------------------------------------------  IN:    IV PiggyBack: 100 mL    Oral Fluid: 476 mL  Total IN: 576 mL    OUT:    Voided: 800 mL  Total OUT: 800 mL    Total NET: -224 mL          MEDICATIONS  (STANDING):  atorvastatin 20 milliGRAM(s) Oral at bedtime  enoxaparin Injectable 40 milliGRAM(s) SubCutaneous daily  levothyroxine 112 MICROGram(s) Oral daily    MEDICATIONS  (PRN):  acetaminophen   Tablet .. 650 milliGRAM(s) Oral every 6 hours PRN Mild Pain (1 - 3), Moderate Pain (4 - 6)  calcium carbonate    500 mG (Tums) Chewable 1 Tablet(s) Chew every 4 hours PRN Dyspepsia  oxyCODONE    IR 5 milliGRAM(s) Oral every 4 hours PRN Severe Pain (7 - 10)                            9.4    10.09 )-----------( 357      ( 28 Apr 2019 04:51 )             29.2       04-28    142  |  106  |  6<L>  ----------------------------<  82  3.4<L>   |  25  |  0.57    Ca    8.5      28 Apr 2019 04:51  Phos  3.3     04-28  Mg     1.4     04-28        Physical Exam:  Gen: Laying in bed, NAD  Resp: Unlabored breathing  Abd: soft, NTND, RLQ incision c/d/i, no rebound or guarding  Ext: WWP

## 2019-04-29 NOTE — DISCHARGE NOTE NURSING/CASE MANAGEMENT/SOCIAL WORK - NSDCDPATPORTLINK_GEN_ALL_CORE
You can access the [facility] Patient Portal, offered by [facility], by registering with the following website: [email or web address]

## 2019-04-29 NOTE — DISCHARGE NOTE PROVIDER - CARE PROVIDER_API CALL
Rik Friend)  Surgery  [address] [facility], Division [place] [address]  Phone: [phone]  Fax: [phone]  Follow Up Time: 1 week

## 2019-04-29 NOTE — DISCHARGE NOTE PROVIDER - NSDCCAREPROVSEEN_GEN_ALL_CORE_FT
Rik Friend Rik Friend  Call to make a follow up appointment within 1 week of discharge.  [phone]

## 2019-04-29 NOTE — DISCHARGE NOTE PROVIDER - HOSPITAL COURSE
58 year old male with a history of gallstones with c/o right abdominal pain, had an ultrasound performed revealing fluid in the abdomin. 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 On 2/22/19 patient was taken to the OR, s/p  Exploratory Laparotomy, Right Hemicolectomy , Ileostomy ,  Splenectomy.  Patient followed up with surgeon for barium testing and presented for Reversal of Ileostomy. Patient was taken to the OR and underwent  ileostomy reversal and tolerated procedure without issue on 4/25/19.  Patient was brought to the floors post-op, pain was well controlled, placed on DVT prophylaxsis, diet was advanced from clear liquid diet to regular as tolerated and out of bed and ambulating as tolerated without issues. Patient has been made aware of discharge home and comfortable. 58 year old male with a history of gallstones with c/o right abdominal pain, had an ultrasound performed revealing fluid in the abdomin. 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 On 2/22/19 patient was taken to the OR, s/p  Exploratory Laparotomy, Right Hemicolectomy , Ileostomy ,  Splenectomy.  Patient followed up with surgeon for barium testing and presented for Reversal of Ileostomy. Patient was taken to the OR and underwent  ileostomy reversal and tolerated procedure without issue on 4/25/19.  Patient was brought to the floors post-op, pain was well controlled, placed on DVT prophylaxsis, diet was advanced from clear liquid diet to regular as tolerated and out of bed and ambulating as tolerated without issues. Patient has been made aware of discharge home and comfortable, will be sent home with pain medication. 58 year old male with a history of gallstones with c/o right abdominal pain, had an ultrasound performed revealing fluid in the abdomin. 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 On 2/22/19 patient was taken to the OR, s/p  Exploratory Laparotomy, Right Hemicolectomy , Ileostomy ,  Splenectomy.  Patient followed up with surgeon for barium testing and presented for Reversal of Ileostomy. Patient was taken to the OR and underwent  ileostomy reversal and tolerated procedure without issue on 4/25/19.  Patient was brought to the floors post-op, pain was well controlled, placed on DVT prophylaxsis, diet was advanced from clear liquid diet to regular as tolerated and out of bed and ambulating as tolerated without issues. Patient has been made aware of discharge home and comfortable with returning home. Patient will be sent home with pain medication. 58 year old male with a history of gallstones with c/o right abdominal pain, had an ultrasound performed revealing fluid in the abdomin. 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 On 2/22/19 patient was taken to the OR, s/p  Exploratory Laparotomy, Right Hemicolectomy , Ileostomy ,  Splenectomy.  Patient followed up with surgeon for barium testing and presented for Reversal of Ileostomy. Patient was taken to the OR and underwent  ileostomy reversal and tolerated procedure without issues on 4/25/19.  Patient was brought to the floor post-op, pain was well controlled, placed on DVT prophylaxsis, diet was advanced from clear liquid diet to regular as tolerated and out of bed and ambulating as tolerated without issues. Patient has been made aware of discharge home and comfortable with returning home. Patient will be sent home with pain medication. 58 year old male with a history of gallstones with c/o right abdominal pain, had an ultrasound performed revealing fluid in the abdomin. 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 On 2/22/19 patient was taken to the OR, s/p  Exploratory Laparotomy, Right Hemicolectomy , Ileostomy ,  Splenectomy.  Patient followed up with surgeon for barium testing and presented for Reversal of Ileostomy. Patient was taken to the OR and underwent  ileostomy reversal and tolerated procedure without issues on 4/25/19.  Patient was brought to the floor post-op and examined with , denied chest pain, shortness of breath, nausea, vomiting, lightheadedness, or dizziness. Pain was well controlled, placed on DVT prophylaxis, diet was advanced from clear liquid diet to regular as tolerated and out of bed and ambulating as tolerated and voiding without issues.         Patient has been made aware of discharge home and comfortable with returning home at this time. Patient will be sent home with pain medication. 58 year old male with a history of gallstones with complaint of right abdominal pain, had an ultrasound performed revealing fluid in the abdomen. 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 On 2/22/19 patient was taken to the OR, s/p  Exploratory Laparotomy, Right Hemicolectomy , Ileostomy , Splenectomy.  Patient followed up with surgeon for barium testing and presented for Reversal of Ileostomy.         Patient was taken to the OR and underwent  ileostomy reversal and tolerated procedure without issues on 4/25/19.  Patient was brought to the floor post-op and stable, denied chest pain, shortness of breath, nausea, vomiting, lightheadedness, or dizziness. Pain was well controlled, placed on DVT prophylaxis, diet was advanced from clear liquid diet to regular as tolerated and out of bed and ambulating as tolerated and voiding without issues.         Patient has been made aware of discharge home and comfortable with returning home at this time. Patient will be sent home with pain medication and set to follow up with surgeon within 1 week of discharge. 58 year old male with a history of gallstones with complaint of right abdominal pain, had an ultrasound performed revealing fluid in the abdomen. 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 On 2/22/19 patient was taken to the OR, s/p  Exploratory Laparotomy, Right Hemicolectomy , Ileostomy , Splenectomy.  Patient followed up with surgeon for barium testing and presented for Reversal of Ileostomy.         Patient was taken to the OR and underwent  ileostomy reversal and tolerated procedure without issues on 4/25/19.  Patient was brought to the floor post-op and stable, denied chest pain, shortness of breath, nausea, vomiting, lightheadedness, or dizziness. Pain was well controlled, placed on DVT prophylaxis, diet was advanced from clear liquid diet to regular as tolerated and out of bed and ambulating as tolerated and voiding without issues.         Patient has been made aware of discharge home and comfortable with returning home at this time. Patient will be sent home with pain medication and set to follow up with surgeon within 1 week of discharge.        Reference #: [id number] 58 year old male with a history of gallstones with complaint of right abdominal pain, had an ultrasound performed revealing fluid in the abdomen. 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 On 2/22/19 patient was taken to the OR, s/p  Exploratory Laparotomy, Right Hemicolectomy , Ileostomy , Splenectomy.  Patient followed up with surgeon for barium testing and presented for Reversal of Ileostomy.         Patient was taken to the OR and underwent  ileostomy reversal and tolerated procedure without issues on 4/25/19.  Patient was brought to the floor post-op and stable, denied chest pain, shortness of breath, nausea, vomiting, lightheadedness, or dizziness. Pain was well controlled, placed on DVT prophylaxis, diet was advanced from clear liquid diet to regular as tolerated and out of bed and ambulating as tolerated and voiding without issues.         Patient has been made aware of discharge home and comfortable with returning home at this time. Patient will be sent home with pain medication and set to follow up with surgeon within 1 week of discharge.        Reference #: [id number]         ATTENDING STATEMENT:    - I have seen and examined the patient on rounds. Patient's chart, labs, images and reports reviewed.    Pt harry diet well    Abd soft NT ND    Incision healing well    Good GI function    -Patient evaluated to be stable for discharge. Patient educated with DC instructions, warning signs and symptoms . Pt also instructed to follow up with the surgeon in 1 week in office. Pt expressed verbal understanding.    -Patient instructed to follow up with me in my office 7-10 days after discharge. Follow up instructions provided.    - I have discussed the diagnosis with the patient in detail. Patient expressed verbal understanding and willingness to proceed with proposed plan. All questions answered    -Patient instructed not to perform heavy activity and/or  lifting >10lbs for 8 weeks. Patient expressed verbal understanding.    Regards    Rik Qureshi MD    Division of Surgical Oncology    [address] [place] [id number]    Ph:[phone]

## 2019-04-29 NOTE — PROGRESS NOTE ADULT - ASSESSMENT
58M s/p ileostomy closure (4/25)    - pain control as needed  - c/w regular diet  - lovenox for DVT ppx  - OOB and ambulating as tolerated  - d/c today    D Team  [id number]

## 2019-04-29 NOTE — DISCHARGE NOTE NURSING/CASE MANAGEMENT/SOCIAL WORK - NSDCPNINST_GEN_ALL_CORE
Keep surgical incisions clean and dry. Report to Emergency Department for any signs of infection, fever or chills.

## 2019-05-08 ENCOUNTER — APPOINTMENT (OUTPATIENT)
Dept: SURGICAL ONCOLOGY | Facility: CLINIC | Age: 59
End: 2019-05-08
Payer: COMMERCIAL

## 2019-05-08 PROCEDURE — 99024 POSTOP FOLLOW-UP VISIT: CPT

## 2019-05-10 NOTE — HISTORY OF PRESENT ILLNESS
[de-identified] : 58 year old male presents for a post op visit, s/p reversal of loop ileostomy on 4/25/19.  Benign pathology.  Feeling well. Good appetite, gaining weight.  Reports mild discomfort at the healing incision and occasional gas pains. Denies fever or chills. Occasional diarrhea which is improved with Imodium. Reports yellowish discharge from the previous ostomy site. \par \par \par PERTINENT HISTORY:\par Initially consulted 12/12/18, referred by Dr. Todd Escalona. \par The patient was seen by Dr. Escalona in November for mild discomfort to the RUQ for many years and significant GERD despite PPI treatment.  Patient is from [facility]. \par \par Abdominal ultrasound performed on 11/15/18 showed moderate amount of very complex ascites within the right and left abdomen causing mass effect and scalloping of the capsule of the liver. CT was then recommended. No evidence of cholecystitis. \par \par Upper GI series performed on 11/15/18 demonstrated a small hiatal hernia and severe GERD. \par \par CT A/P performed on 12/11/18 for Ascites revealed -FINDINGS: Limited sections through the lung bases demonstrate mild  bilateral atelectasis. There is a small bleb in the left lower lobe.  There is a dilated fluid-filled structure adjacent to and inseparable  from the cecum measuring up to 4.5 cm in diameter, suggestive of mucocele  of the appendix. Extensive complete free fluid is noted within the  abdomen and pelvis. There is scalloping at the liver capsule. 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 There is a tiny hypodense lesion in the liver dome, too small  to characterize. Nonspecific subcapsular a 9 mm hyperdense focus in  hepatic segment 4.  Gallstones in the gallbladder. The pancreas, spleen, adrenals, and kidney  appear unremarkable.  No bowel obstruction or grossly thickened bowel. Moderate amount of stool  in the rectum.  No evidence for free air, or enlarged lymph node.   The urinary bladder appears unremarkable. The prostate is enlarged.  CT IMPRESSION: Findings are suggestive of mucocele of the appendix with  associated malignant pseudomyxoma peritonei and associated hepatic  capsular metastatic implants.   9 mm nonspecific hyperdense focus in hepatic segment 4. If clinically  indicated, abdominal MR without and with IV contrast may be pursued for  further evaluation.\par \par PMH notable for HTN, HLD, total thyroidectomy for reportedly benign thyroid nodules 2013, DM2 (on Metformin), knee surgery 1996, gallstones since 1998. Social alcohol use.  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 \par \par Family history of malignancies involves his mother with ovarian cancer at age 72. His 1 sister underwent genetic testing and was found to be BRCA negative. He states that same sister also had a benign pancreatic tumor removed.  His other sister was diagnosed with breast cancer at age 38, s/p chemo and surgery and tested positive for BRCA gene. He denies any other family history of malignancies. \par \par States he had a colonoscopy in 2/2017 and had 3 polyps excised. He also reports a history of H. Pylori for which he was treated for and most recent endoscopy showed gastritis but no H. Pylori. \par \par He underwent a screening colonoscopy with Dr. Kate Irby on 1/3/19 where he was found to have a descending colon polyp, cecal polyp and a descending polyp with benign pathology. Cecal and terminal biopsy was also benign. \par \par Today he is with c/o mild/dull, 4/10, intermittent right upper quadrant abdominal pain since 2016. The pain is worse with heavy and fatty meals.  He also repots early satiety, 4 lb weight loss, occasional abdominal bloating. Denies changes in appetite or bowel habits. Denies nausea or vomiting. States he recently retired and his sleeping patterns have been thrown off. He states he now sleeps during the day and stays up a lot of the night. 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  \par \par Final pathology:\par 1) Falciform ligament excision: Metastatic low grade mucinous adenocarcinoma (M1)\par 2) Peritoneum, omentum, resection: Metastatic low grade mucinous adenocarcinoma (M1)\par 3) Spleen: Perisplenic soft tissue with metastatic low grade mucinous adenocarcinoma, splenic parenchyma is uninvolved. 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 \par TUMOR STAGE: [id number]\par -Postoperative course complicated by an ileus, resolved after NGT re-insertion. \par \par He consulted with Dr. Hernandez on 4/10/19. \par Labs 4/10/19: CEA (69); Hep A&B Negative; Hepatitis C reactive (1.78 H); TSH WNL; LFT's slightly elevated; Platelets (660 H)\par \par Xray Barium Enema performed on 4/11/19 with unremarkable findings, specifically no evidence of a leak.

## 2019-05-10 NOTE — REASON FOR VISIT
[Family Member] : family member [Post-Op] : a post-op for [FreeTextEntry2] : metastatic mucinous adenocarcinoma ; now s/p ileostomy reversal on 4/25/19

## 2019-05-10 NOTE — PHYSICAL EXAM
[Normal] : well developed, well nourished, in no acute distress [de-identified] : soft, ND, NT; healed midline incision; healing RLQ ostomy site with yellow/fibrinous drainage ; no evidence of infection

## 2019-05-10 NOTE — ASSESSMENT
[FreeTextEntry1] : IMP:\par CT revealing Mucocele of the appendix with  associated  pseudomyxoma peritonei and associated hepatic  capsular implants. 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  \par \par Final pathology:\par 1) Falciform ligament excision: Metastatic low grade mucinous adenocarcinoma (M1)\par 2) Peritoneum, omentum, resection: Metastatic low grade mucinous adenocarcinoma (M1)\par 3) Spleen: Perisplenic soft tissue with metastatic low grade mucinous adenocarcinoma, splenic parenchyma is uninvolved. 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 \par TUMOR STAGE: [id number]\par \par He consulted with Dr. Hernandez on 4/10/19. \par Labs 4/10/19: CEA (69); Hep A&B Negative; Hepatitis C REACTIVE (1.78 H); TSH WNL; LFT's slightly elevated; Platelets (660 H)\par \par Xray Barium Enema performed on 4/11/19 with unremarkable findings, specifically no evidence of a leak. \par \par Now s/p reversal of loop ileostomy on 4/25/19.  Benign pathology.  Feeling well. Having BM's, good appetite, continues to gain weight. Denies fever or chills. \par \par PLAN:\par 1) Continue f/u with medical oncology -\par 2) Take Pepcid daily and Gas-X as needed\par 3) Ensure supplements \par 4) RTO 3 months \par

## 2019-08-14 ENCOUNTER — APPOINTMENT (OUTPATIENT)
Dept: SURGICAL ONCOLOGY | Facility: CLINIC | Age: 59
End: 2019-08-14
Payer: COMMERCIAL

## 2019-08-14 PROCEDURE — 99214 OFFICE O/P EST MOD 30 MIN: CPT

## 2019-08-27 NOTE — ASSESSMENT
[FreeTextEntry1] : IMP:\par CT revealing Mucocele of the appendix with  associated  pseudomyxoma peritonei and associated hepatic  capsular implants. 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  \par \par Final pathology:\par 1) Falciform ligament excision: Metastatic low grade mucinous adenocarcinoma (M1)\par 2) Peritoneum, omentum, resection: Metastatic low grade mucinous adenocarcinoma (M1)\par 3) Spleen: Perisplenic soft tissue with metastatic low grade mucinous adenocarcinoma, splenic parenchyma is uninvolved. 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 \par TUMOR STAGE: [id number]\par \par He consulted with Dr. Hernandez on 4/10/19. \par Labs 4/10/19: CEA (69); Hep A&B Negative; Hepatitis C REACTIVE (1.78 H); TSH WNL; LFT's slightly elevated; Platelets (660 H)\par \par Xray Barium Enema performed on 4/11/19 with unremarkable findings, specifically no evidence of a leak. \par \par Now s/p reversal of loop ileostomy on 4/25/19.  Benign pathology.  Feeling well. Having BM's, good appetite, continues to gain weight. Denies fever or chills. \par \par PLAN:\par 1) Continue f/u with medical oncology -\par 2) RTO mid October 2019 \par

## 2019-08-27 NOTE — HISTORY OF PRESENT ILLNESS
[de-identified] : 58 year old male presents for a 3 month follow up visit. 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  Final pathology showed a [id number] mucinous adenocarcinoma, appendiceal origin.  He is now s/p reversal of loop ileostomy on 4/25/19 with benign pathology.  \par \par He is feeling well with no complaints. Currently receiving chemotherapy under the care of Dr. Hernandez. Denies abdominal pain, nausea, vomiting, changes in bowel habits or appetite. He continues to gain weight.  He will undergo repeat imaging at the end of 9/2019. \par \par \par PERTINENT HISTORY:\par Initially consulted 12/12/18, referred by Dr. Todd Escalona. \par The patient was seen by Dr. Escalona in November for mild discomfort to the RUQ for many years and significant GERD despite PPI treatment.  Patient is from [facility]. \par \par Abdominal ultrasound performed on 11/15/18 showed moderate amount of very complex ascites within the right and left abdomen causing mass effect and scalloping of the capsule of the liver. CT was then recommended. No evidence of cholecystitis. \par \par Upper GI series performed on 11/15/18 demonstrated a small hiatal hernia and severe GERD. \par \par CT A/P performed on 12/11/18 for Ascites revealed -FINDINGS: Limited sections through the lung bases demonstrate mild  bilateral atelectasis. There is a small bleb in the left lower lobe.  There is a dilated fluid-filled structure adjacent to and inseparable  from the cecum measuring up to 4.5 cm in diameter, suggestive of mucocele  of the appendix. Extensive complete free fluid is noted within the  abdomen and pelvis. There is scalloping at the liver capsule. 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 There is a tiny hypodense lesion in the liver dome, too small  to characterize. Nonspecific subcapsular a 9 mm hyperdense focus in  hepatic segment 4.  Gallstones in the gallbladder. The pancreas, spleen, adrenals, and kidney  appear unremarkable.  No bowel obstruction or grossly thickened bowel. Moderate amount of stool  in the rectum.  No evidence for free air, or enlarged lymph node.   The urinary bladder appears unremarkable. The prostate is enlarged.  CT IMPRESSION: Findings are suggestive of mucocele of the appendix with  associated malignant pseudomyxoma peritonei and associated hepatic  capsular metastatic implants.   9 mm nonspecific hyperdense focus in hepatic segment 4. If clinically  indicated, abdominal MR without and with IV contrast may be pursued for  further evaluation.\par \par PMH notable for HTN, HLD, total thyroidectomy for reportedly benign thyroid nodules 2013, DM2 (on Metformin), knee surgery 1996, gallstones since 1998. Social alcohol use.  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 \par \par Family history of malignancies involves his mother with ovarian cancer at age 72. His 1 sister underwent genetic testing and was found to be BRCA negative. He states that same sister also had a benign pancreatic tumor removed.  His other sister was diagnosed with breast cancer at age 38, s/p chemo and surgery and tested positive for BRCA gene. He denies any other family history of malignancies. \par \par States he had a colonoscopy in 2/2017 and had 3 polyps excised. He also reports a history of H. Pylori for which he was treated for and most recent endoscopy showed gastritis but no H. Pylori. \par \par He underwent a screening colonoscopy with Dr. Kate Irby on 1/3/19 where he was found to have a descending colon polyp, cecal polyp and a descending polyp with benign pathology. Cecal and terminal biopsy was also benign. \par \par Today he is with c/o mild/dull, 4/10, intermittent right upper quadrant abdominal pain since 2016. The pain is worse with heavy and fatty meals.  He also repots early satiety, 4 lb weight loss, occasional abdominal bloating. Denies changes in appetite or bowel habits. Denies nausea or vomiting. States he recently retired and his sleeping patterns have been thrown off. He states he now sleeps during the day and stays up a lot of the night. 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  \par \par Final pathology:\par 1) Falciform ligament excision: Metastatic low grade mucinous adenocarcinoma (M1)\par 2) Peritoneum, omentum, resection: Metastatic low grade mucinous adenocarcinoma (M1)\par 3) Spleen: Perisplenic soft tissue with metastatic low grade mucinous adenocarcinoma, splenic parenchyma is uninvolved. 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 \par TUMOR STAGE: [id number]\par -Postoperative course complicated by an ileus, resolved after NGT re-insertion. \par \par He consulted with Dr. Hernandez on 4/10/19. \par Labs 4/10/19: CEA (69); Hep A&B Negative; Hepatitis C reactive (1.78 H); TSH WNL; LFT's slightly elevated; Platelets (660 H)\par \par Xray Barium Enema performed on 4/11/19 with unremarkable findings, specifically no evidence of a leak. \par \par  s/p reversal of loop ileostomy on 4/25/19 with benign pathology.

## 2019-08-27 NOTE — PHYSICAL EXAM
[Normal] : supple, no neck mass and thyroid not enlarged [Normal Neck Lymph Nodes] : normal neck lymph nodes  [Normal Supraclavicular Lymph Nodes] : normal supraclavicular lymph nodes [Normal Groin Lymph Nodes] : normal groin lymph nodes [Normal Axillary Lymph Nodes] : normal axillary lymph nodes [Normal] : oriented to person, place and time, with appropriate affect [de-identified] : soft, ND, NT; healed midline incision; healed RLQ ostomy site

## 2019-08-27 NOTE — REASON FOR VISIT
[Follow-Up Visit] : a follow-up visit for [FreeTextEntry2] : metastatic mucinous adenocarcinoma ; now s/p ileostomy reversal on 4/25/19

## 2019-08-30 ENCOUNTER — APPOINTMENT (OUTPATIENT)
Dept: CT IMAGING | Facility: HOSPITAL | Age: 59
End: 2019-08-30
Payer: COMMERCIAL

## 2019-08-30 ENCOUNTER — OUTPATIENT (OUTPATIENT)
Dept: OUTPATIENT SERVICES | Facility: HOSPITAL | Age: 59
LOS: 1 days | End: 2019-08-30
Payer: COMMERCIAL

## 2019-08-30 DIAGNOSIS — Z98.890 OTHER SPECIFIED POSTPROCEDURAL STATES: Chronic | ICD-10-CM

## 2019-08-30 DIAGNOSIS — C18.1 MALIGNANT NEOPLASM OF APPENDIX: ICD-10-CM

## 2019-08-30 PROCEDURE — 74177 CT ABD & PELVIS W/CONTRAST: CPT | Mod: 26

## 2019-08-30 PROCEDURE — 74177 CT ABD & PELVIS W/CONTRAST: CPT

## 2019-10-23 ENCOUNTER — APPOINTMENT (OUTPATIENT)
Dept: SURGICAL ONCOLOGY | Facility: CLINIC | Age: 59
End: 2019-10-23
Payer: COMMERCIAL

## 2019-10-23 VITALS
DIASTOLIC BLOOD PRESSURE: 84 MMHG | HEART RATE: 93 BPM | BODY MASS INDEX: 23.14 KG/M2 | WEIGHT: 144 LBS | OXYGEN SATURATION: 96 % | SYSTOLIC BLOOD PRESSURE: 124 MMHG | HEIGHT: 66 IN

## 2019-10-23 PROCEDURE — 99214 OFFICE O/P EST MOD 30 MIN: CPT

## 2019-10-23 NOTE — PHYSICAL EXAM
[Normal] : supple, no neck mass and thyroid not enlarged [Normal Neck Lymph Nodes] : normal neck lymph nodes  [Normal Supraclavicular Lymph Nodes] : normal supraclavicular lymph nodes [Normal Groin Lymph Nodes] : normal groin lymph nodes [Normal Axillary Lymph Nodes] : normal axillary lymph nodes [Normal] : grossly intact [de-identified] : soft, ND, NT; healed midline incision; healed RLQ ostomy site ; no masses or hernias

## 2019-10-23 NOTE — ASSESSMENT
[FreeTextEntry1] : IMP:\par CT revealing Mucocele of the appendix with  associated  pseudomyxoma peritonei and associated hepatic  capsular implants. 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  \par \par Final pathology:\par 1) Falciform ligament excision: Metastatic low grade mucinous adenocarcinoma (M1)\par 2) Peritoneum, omentum, resection: Metastatic low grade mucinous adenocarcinoma (M1)\par 3) Spleen: Perisplenic soft tissue with metastatic low grade mucinous adenocarcinoma, splenic parenchyma is uninvolved. 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 \par TUMOR STAGE: [id number]\par \par Chemotherapy under the care of Dr. Hernandez. \par Xray Barium Enema performed on 4/11/19 with unremarkable findings, specifically no evidence of a leak. \par Now s/p reversal of loop ileostomy on 4/25/19.  Benign pathology. \par \par CT A/P 8/30/19- IMPRESSION:  Stable soft tissue implants throughout the abdomen and pelvis.  Status post reversal of right lower quadrant colostomy. Status post right hemicolectomy.  Cholelithiasis.\par \par Patient is with c/o chemo related neuropathy and fatigue.  Denies abdominal pain, nausea, vomiting, appetite or bowel habit changes. Continues to gain weight. No masses or hernias on exam. \par \par PLAN:\par 1) Continue f/u with medical oncology -\par 2) Imaging as per Dr. Hernandez (pt. states he's scheduled for repeat imaging in 11/2019)\par 3) RTO in 1/2020\par

## 2019-10-23 NOTE — HISTORY OF PRESENT ILLNESS
[de-identified] : 59 year old male presents for a 2 month follow up visit. 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  Final pathology showed a [id number] mucinous adenocarcinoma, appendiceal origin.  He is now s/p reversal of loop ileostomy on 4/25/19 with benign pathology.  \par \par CT A/P 8/30/19- IMPRESSION:  Stable soft tissue implants throughout the abdomen and pelvis.  Status post reversal of right lower quadrant colostomy. Status post right hemicolectomy.  Cholelithiasis.\par \par BW 10/16/19:  (205) ; CEA (78.7); Hgb and Hct WNL\par \par Continues chemotherapy under the care of Dr. Hernandez. He has developed chemo related fatigue and neuropathy. Denies abdominal pain, nausea, vomiting, changes in bowel habits or appetite. He continues to gain weight.\par \par \par PERTINENT HISTORY:\par Initially consulted 12/12/18, referred by Dr. Todd Escalona. \par The patient was seen by Dr. Escalona in November for mild discomfort to the RUQ for many years and significant GERD despite PPI treatment.  Patient is from [facility]. \par \par Abdominal ultrasound performed on 11/15/18 showed moderate amount of very complex ascites within the right and left abdomen causing mass effect and scalloping of the capsule of the liver. CT was then recommended. No evidence of cholecystitis. \par \par Upper GI series performed on 11/15/18 demonstrated a small hiatal hernia and severe GERD. \par \par CT A/P performed on 12/11/18 for Ascites revealed -FINDINGS: Limited sections through the lung bases demonstrate mild  bilateral atelectasis. There is a small bleb in the left lower lobe.  There is a dilated fluid-filled structure adjacent to and inseparable  from the cecum measuring up to 4.5 cm in diameter, suggestive of mucocele  of the appendix. Extensive complete free fluid is noted within the  abdomen and pelvis. There is scalloping at the liver capsule. 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 There is a tiny hypodense lesion in the liver dome, too small  to characterize. Nonspecific subcapsular a 9 mm hyperdense focus in  hepatic segment 4.  Gallstones in the gallbladder. The pancreas, spleen, adrenals, and kidney  appear unremarkable.  No bowel obstruction or grossly thickened bowel. Moderate amount of stool  in the rectum.  No evidence for free air, or enlarged lymph node.   The urinary bladder appears unremarkable. The prostate is enlarged.  CT IMPRESSION: Findings are suggestive of mucocele of the appendix with  associated malignant pseudomyxoma peritonei and associated hepatic  capsular metastatic implants.   9 mm nonspecific hyperdense focus in hepatic segment 4. If clinically  indicated, abdominal MR without and with IV contrast may be pursued for  further evaluation.\par \par PMH notable for HTN, HLD, total thyroidectomy for reportedly benign thyroid nodules 2013, DM2 (on Metformin), knee surgery 1996, gallstones since 1998. Social alcohol use.  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 \par \par Family history of malignancies involves his mother with ovarian cancer at age 72. His 1 sister underwent genetic testing and was found to be BRCA negative. He states that same sister also had a benign pancreatic tumor removed.  His other sister was diagnosed with breast cancer at age 38, s/p chemo and surgery and tested positive for BRCA gene. He denies any other family history of malignancies. \par \par States he had a colonoscopy in 2/2017 and had 3 polyps excised. He also reports a history of H. Pylori for which he was treated for and most recent endoscopy showed gastritis but no H. Pylori. \par \par He underwent a screening colonoscopy with Dr. Kate Irby on 1/3/19 where he was found to have a descending colon polyp, cecal polyp and a descending polyp with benign pathology. Cecal and terminal biopsy was also benign. \par \par Today he is with c/o mild/dull, 4/10, intermittent right upper quadrant abdominal pain since 2016. The pain is worse with heavy and fatty meals.  He also repots early satiety, 4 lb weight loss, occasional abdominal bloating. Denies changes in appetite or bowel habits. Denies nausea or vomiting. States he recently retired and his sleeping patterns have been thrown off. He states he now sleeps during the day and stays up a lot of the night. 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  \par \par Final pathology:\par 1) Falciform ligament excision: Metastatic low grade mucinous adenocarcinoma (M1)\par 2) Peritoneum, omentum, resection: Metastatic low grade mucinous adenocarcinoma (M1)\par 3) Spleen: Perisplenic soft tissue with metastatic low grade mucinous adenocarcinoma, splenic parenchyma is uninvolved. 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 \par TUMOR STAGE: [id number]\par -Postoperative course complicated by an ileus, resolved after NGT re-insertion. \par \par He consulted with Dr. Hernandez on 4/10/19. \par Labs 4/10/19: CEA (69); Hep A&B Negative; Hepatitis C reactive (1.78 H); TSH WNL; LFT's slightly elevated; Platelets (660 H)\par \par Xray Barium Enema performed on 4/11/19 with unremarkable findings, specifically no evidence of a leak. \par \par  s/p reversal of loop ileostomy on 4/25/19 with benign pathology.

## 2020-01-02 ENCOUNTER — FORM ENCOUNTER (OUTPATIENT)
Age: 60
End: 2020-01-02

## 2020-01-03 ENCOUNTER — OUTPATIENT (OUTPATIENT)
Dept: OUTPATIENT SERVICES | Facility: HOSPITAL | Age: 60
LOS: 1 days | End: 2020-01-03
Payer: COMMERCIAL

## 2020-01-03 ENCOUNTER — APPOINTMENT (OUTPATIENT)
Dept: CT IMAGING | Facility: HOSPITAL | Age: 60
End: 2020-01-03
Payer: COMMERCIAL

## 2020-01-03 DIAGNOSIS — Z98.890 OTHER SPECIFIED POSTPROCEDURAL STATES: Chronic | ICD-10-CM

## 2020-01-03 DIAGNOSIS — C18.1 MALIGNANT NEOPLASM OF APPENDIX: ICD-10-CM

## 2020-01-03 DIAGNOSIS — E78.5 HYPERLIPIDEMIA, UNSPECIFIED: ICD-10-CM

## 2020-01-03 DIAGNOSIS — E11.9 TYPE 2 DIABETES MELLITUS WITHOUT COMPLICATIONS: ICD-10-CM

## 2020-01-03 PROCEDURE — 71260 CT THORAX DX C+: CPT | Mod: 26

## 2020-01-03 PROCEDURE — 71260 CT THORAX DX C+: CPT

## 2020-01-03 PROCEDURE — 74177 CT ABD & PELVIS W/CONTRAST: CPT | Mod: 26

## 2020-01-03 PROCEDURE — 74177 CT ABD & PELVIS W/CONTRAST: CPT

## 2020-01-21 ENCOUNTER — OUTPATIENT (OUTPATIENT)
Dept: OUTPATIENT SERVICES | Facility: HOSPITAL | Age: 60
LOS: 1 days | End: 2020-01-21
Payer: COMMERCIAL

## 2020-01-21 VITALS
SYSTOLIC BLOOD PRESSURE: 112 MMHG | DIASTOLIC BLOOD PRESSURE: 78 MMHG | TEMPERATURE: 97 F | OXYGEN SATURATION: 98 % | RESPIRATION RATE: 14 BRPM | WEIGHT: 145.95 LBS | HEIGHT: 66 IN | HEART RATE: 74 BPM

## 2020-01-21 DIAGNOSIS — C18.1 MALIGNANT NEOPLASM OF APPENDIX: ICD-10-CM

## 2020-01-21 DIAGNOSIS — Z98.890 OTHER SPECIFIED POSTPROCEDURAL STATES: Chronic | ICD-10-CM

## 2020-01-21 DIAGNOSIS — E11.9 TYPE 2 DIABETES MELLITUS WITHOUT COMPLICATIONS: ICD-10-CM

## 2020-01-21 DIAGNOSIS — I10 ESSENTIAL (PRIMARY) HYPERTENSION: ICD-10-CM

## 2020-01-21 DIAGNOSIS — E03.9 HYPOTHYROIDISM, UNSPECIFIED: ICD-10-CM

## 2020-01-21 LAB
ANION GAP SERPL CALC-SCNC: 14 MMO/L — SIGNIFICANT CHANGE UP (ref 7–14)
BUN SERPL-MCNC: 10 MG/DL — SIGNIFICANT CHANGE UP (ref 7–23)
CALCIUM SERPL-MCNC: 9.6 MG/DL — SIGNIFICANT CHANGE UP (ref 8.4–10.5)
CHLORIDE SERPL-SCNC: 98 MMOL/L — SIGNIFICANT CHANGE UP (ref 98–107)
CO2 SERPL-SCNC: 25 MMOL/L — SIGNIFICANT CHANGE UP (ref 22–31)
CREAT SERPL-MCNC: 0.66 MG/DL — SIGNIFICANT CHANGE UP (ref 0.5–1.3)
GLUCOSE SERPL-MCNC: 60 MG/DL — LOW (ref 70–99)
HBA1C BLD-MCNC: 6 % — HIGH (ref 4–5.6)
HCT VFR BLD CALC: 39.6 % — SIGNIFICANT CHANGE UP (ref 39–50)
HGB BLD-MCNC: 12.6 G/DL — LOW (ref 13–17)
MCHC RBC-ENTMCNC: 29.5 PG — SIGNIFICANT CHANGE UP (ref 27–34)
MCHC RBC-ENTMCNC: 31.8 % — LOW (ref 32–36)
MCV RBC AUTO: 92.7 FL — SIGNIFICANT CHANGE UP (ref 80–100)
NRBC # FLD: 0 K/UL — SIGNIFICANT CHANGE UP (ref 0–0)
PLATELET # BLD AUTO: 515 K/UL — HIGH (ref 150–400)
PMV BLD: 10.5 FL — SIGNIFICANT CHANGE UP (ref 7–13)
POTASSIUM SERPL-MCNC: 3.4 MMOL/L — LOW (ref 3.5–5.3)
POTASSIUM SERPL-SCNC: 3.4 MMOL/L — LOW (ref 3.5–5.3)
RBC # BLD: 4.27 M/UL — SIGNIFICANT CHANGE UP (ref 4.2–5.8)
RBC # FLD: 15.1 % — HIGH (ref 10.3–14.5)
SODIUM SERPL-SCNC: 137 MMOL/L — SIGNIFICANT CHANGE UP (ref 135–145)
WBC # BLD: 11.79 K/UL — HIGH (ref 3.8–10.5)
WBC # FLD AUTO: 11.79 K/UL — HIGH (ref 3.8–10.5)

## 2020-01-21 RX ORDER — ACETAMINOPHEN 500 MG
2 TABLET ORAL
Qty: 0 | Refills: 0 | DISCHARGE

## 2020-01-21 RX ORDER — LEVOTHYROXINE SODIUM 125 MCG
1 TABLET ORAL
Qty: 0 | Refills: 0 | DISCHARGE

## 2020-01-21 RX ORDER — METFORMIN HYDROCHLORIDE 850 MG/1
1 TABLET ORAL
Qty: 0 | Refills: 0 | DISCHARGE

## 2020-01-21 RX ORDER — ATORVASTATIN CALCIUM 80 MG/1
1 TABLET, FILM COATED ORAL
Qty: 0 | Refills: 0 | DISCHARGE

## 2020-01-21 NOTE — H&P PST ADULT - EKG AND INTERPRETATION
EKG to be faxed over from Dr. Colon's office confirmed with  Lory [phone] EKG to be faxed over from Dr. Colon's office confirmed with office staff-Lory [phone]

## 2020-01-21 NOTE — H&P PST ADULT - NEGATIVE MUSCULOSKELETAL SYMPTOMS
no arthralgia/no joint swelling/no arthritis/no muscle cramps/no neck pain/no arm pain L/no arm pain R/no leg pain R/no muscle weakness/no back pain/no leg pain L

## 2020-01-21 NOTE — H&P PST ADULT - NSICDXPASTMEDICALHX_GEN_ALL_CORE_FT
PAST MEDICAL HISTORY:  Diabetes mellitus type 2    Gallstones     Gastritis     Hyperlipidemia     Hypertension pt states  losartan dc d 2 months ago    Malignant neoplasm of appendix     Thyroid nodule benign s/p Thyroidectomy    Tuberculosis exposure 1996 - treated with medicaiton 10 months

## 2020-01-21 NOTE — H&P PST ADULT - ASSESSMENT
malignant neoplasm of appendix PROBLEM DIAGNOSES  Problem: Malignant neoplasm of appendix  Assessment and Plan:   Patient is scheduled for mediport insertion on 1/23/2020. Pre-op instructions provided. Pt given verbal and written instructions with teach back on chlorhexidine soap and pepcid. Pt verbalized understanding with return demonstration.   PMH diabetes, OR booking notified.     Diabetes mellitus), type 2  Assessment and Plan:   Pt instructed to stop his metformin as of 1/21/2020, pt verbalized his understanding.     Problem: Hypothyroid  Assessment and Plan:   Pt instructed to take his levothyroxine in the morning of surgery with sips of water, pt able to verbalize understanding.     Problem: HTN (hypertension)  Assessment and Plan:  Pt instructed to take his losartan with sips of water the morning of his procedure , pt able to verbalize understanding. PROBLEM DIAGNOSES  Problem: Malignant neoplasm of appendix  Assessment and Plan:   Patient is scheduled for mediport insertion on 1/23/2020. Pre-op instructions provided. Pt given verbal and written instructions with teach back on chlorhexidine soap and pepcid. Pt verbalized understanding with return demonstration.     PMH diabetes, OR booking notified.   CHRISTOPHER precautions, OR booking notified.     Diabetes mellitus), type 2  Assessment and Plan:   Pt instructed to stop his metformin as of 1/21/2020, pt verbalized his understanding.     Problem: Hypothyroid  Assessment and Plan:   Pt instructed to take his levothyroxine in the morning of surgery with sips of water, pt able to verbalize understanding.     Problem: HTN (hypertension)  Assessment and Plan:  Pt instructed to take his losartan with sips of water the morning of his procedure , pt able to verbalize understanding.

## 2020-01-21 NOTE — H&P PST ADULT - NSICDXPROBLEM_GEN_ALL_CORE_FT
PROBLEM DIAGNOSES  Problem: Malignant neoplasm of appendix  Assessment and Plan:     Problem: DM (diabetes mellitus), type 2  Assessment and Plan:     Problem: Hypothyroid  Assessment and Plan:     Problem: HTN (hypertension)  Assessment and Plan:

## 2020-01-21 NOTE — H&P PST ADULT - NSICDXPASTSURGICALHX_GEN_ALL_CORE_FT
PAST SURGICAL HISTORY:  H/O exploratory laparotomy Right hemicolectomy , Ileostomy , Splenectomy ; 2/19    H/O thyroidectomy     History of reversal of ileostomy     S/P right knee arthroscopy 1996

## 2020-01-21 NOTE — H&P PST ADULT - NEGATIVE OPHTHALMOLOGIC SYMPTOMS
no photophobia/no blurred vision R/no discharge R/no pain L/no pain R/no loss of vision L/no discharge L/no loss of vision R/no diplopia/no blurred vision L

## 2020-01-21 NOTE — H&P PST ADULT - NEGATIVE ENMT SYMPTOMS
no nose bleeds/no gum bleeding/no throat pain/no hearing difficulty/no vertigo/no dysphagia/no ear pain

## 2020-01-21 NOTE — H&P PST ADULT - NEGATIVE GENERAL GENITOURINARY SYMPTOMS
no hematuria/no bladder infections/no flank pain L/no dysuria/no flank pain R/normal urinary frequency

## 2020-01-21 NOTE — H&P PST ADULT - RS GEN PE MLT RESP DETAILS PC
no rhonchi/no rales/respirations non-labored/no wheezes/good air movement/breath sounds equal/airway patent

## 2020-01-21 NOTE — H&P PST ADULT - HISTORY OF PRESENT ILLNESS
malignant neoplasm of appendix 59 year old male with history of mucinous adenocarcinoma, s/p exploratory laparotomy, right hemicolectomy, splenectomy, intra-abdominal tumor debulking, diverting loop ileostomy (2/2019),  s/p ileostomy reversal (4/2019) , who presents to [facility] for evaluation for scheduled mediport insertion on 1/23/2020.

## 2020-01-21 NOTE — H&P PST ADULT - NEGATIVE NEUROLOGICAL SYMPTOMS
no weakness/no focal seizures/no difficulty walking/no generalized seizures/no syncope/no headache/no vertigo/no loss of consciousness/no confusion

## 2020-01-22 ENCOUNTER — FORM ENCOUNTER (OUTPATIENT)
Age: 60
End: 2020-01-22

## 2020-01-23 ENCOUNTER — OUTPATIENT (OUTPATIENT)
Dept: OUTPATIENT SERVICES | Facility: HOSPITAL | Age: 60
LOS: 1 days | Discharge: HOME | End: 2020-01-23
Payer: COMMERCIAL

## 2020-01-23 ENCOUNTER — APPOINTMENT (OUTPATIENT)
Dept: SURGICAL ONCOLOGY | Facility: AMBULATORY SURGERY CENTER | Age: 60
End: 2020-01-23

## 2020-01-23 VITALS
WEIGHT: 145.95 LBS | OXYGEN SATURATION: 99 % | DIASTOLIC BLOOD PRESSURE: 78 MMHG | HEIGHT: 66 IN | RESPIRATION RATE: 14 BRPM | HEART RATE: 82 BPM | SYSTOLIC BLOOD PRESSURE: 140 MMHG | TEMPERATURE: 98 F

## 2020-01-23 VITALS
RESPIRATION RATE: 18 BRPM | OXYGEN SATURATION: 98 % | DIASTOLIC BLOOD PRESSURE: 72 MMHG | SYSTOLIC BLOOD PRESSURE: 120 MMHG | HEART RATE: 85 BPM

## 2020-01-23 DIAGNOSIS — C18.1 MALIGNANT NEOPLASM OF APPENDIX: ICD-10-CM

## 2020-01-23 DIAGNOSIS — Z98.890 OTHER SPECIFIED POSTPROCEDURAL STATES: Chronic | ICD-10-CM

## 2020-01-23 LAB — GLUCOSE BLDC GLUCOMTR-MCNC: 81 MG/DL — SIGNIFICANT CHANGE UP (ref 70–99)

## 2020-01-23 PROCEDURE — 77001 FLUOROGUIDE FOR VEIN DEVICE: CPT | Mod: 26

## 2020-01-23 PROCEDURE — 36561 INSERT TUNNELED CV CATH: CPT

## 2020-01-23 PROCEDURE — 71045 X-RAY EXAM CHEST 1 VIEW: CPT | Mod: 26

## 2020-01-23 RX ORDER — SODIUM CHLORIDE 9 MG/ML
1000 INJECTION, SOLUTION INTRAVENOUS
Refills: 0 | Status: DISCONTINUED | OUTPATIENT
Start: 2020-01-23 | End: 2020-02-07

## 2020-01-23 RX ORDER — OXYCODONE HYDROCHLORIDE 5 MG/1
1 TABLET ORAL
Qty: 10 | Refills: 0
Start: 2020-01-23 | End: 2020-01-24

## 2020-01-23 NOTE — ASU DISCHARGE PLAN (ADULT/PEDIATRIC) - CARE PROVIDER_API CALL
Rik Friend)  Surgery  [address] [facility], Division of Surgical [place] [address]  Phone: [phone]  Fax: [phone]  Follow Up Time:

## 2020-01-23 NOTE — ASU PREOPERATIVE ASSESSMENT, ADULT ([facility] ONLY) - TEACHING/LEARNING OTHER LEARNERS
Reason For Visit  OB & Gyn Reason For Visit:   Patient presents for Annual Gyne Visit.   urine odor.   Chaperone: A chaperone is not applicable. BLAKE ENGEL is accompanied by no one.          Quality  Quality:   Adult Wellness CI alcohol use, no tobacco use, mammogram performed: 2017, pap smear performed: 2010, not taking medication for depression, no preventive medicine therapy for influenza and not taking aspirin.   Communication CI communication of results to PCP: , communication of plan to PCP: , communication of results to Patient: , communication of plan to Patient: , no tobacco use, did not provide intervention and counseling in regards to tobacco use and seeing a family doctor or pcp.      History of Present Illness  General Gyne HPI: pap smear and breast exam  denies gyne problems today  Douching 2 x a week  Bloating  due for mammogram  h/o hysterectomy    Pts. age: 56 .    and Para: G:3 P:2   Last menstrual period was stopped .     Sexually active.    No reported positive Pap Smear.   No reported history of STD.   Menopausal .   Last Screening:   Pap Smear performed 2010.    Mammogram performed 2017.    Last Colonoscopy performed: 2014.      Review of Systems  ROS Complete:   Constitutional: negative.   Head and Face: negative.   Eyes: negative.   ENT: negative.   Cardiovascular: negative.   Respiratory: negative.   Gastrointestinal: negative.   Genitourinary: as noted in HPI.   Musculoskeletal: negative.   Integumentary and Breasts: negative.   Neurological: negative.   Psychiatric: negative.   Endocrine: negative.   Hematologic and Lymphatic: negative.      Active Problems   1. Abnormal glucose (R73.09)   2. Abnormal mammogram (R92.8)   3. Acute renal insufficiency (N28.9)   4. Anemia (D64.9)   5. At risk for hepatitis (Z91.89)   6. Blood pressure elevated without history of HTN (R03.0)   7. BMI 34.0-34.9,adult (Z68.34)   8. Cervicalgia (M54.2)   9. Degeneration of  cervical intervertebral disc (M50.30)   10. Dyspareunia   11. Encounter for Pap smear of cervix with HPV DNA cotesting (Z12.4)   12. Encounter for screening for malignant neoplasm of colon (Z12.11)   13. Eye disorder (H57.9)   14. History of influenza vaccination (Z92.29)   15. 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 Inadequacy Of Lubrication Of Vaginal Mucosa   17. Leukocytosis (D72.829)   18. Leukopenia (D72.819)   19. Mammogram abnormal (R92.8)   20. Neutropenia (D70.9)   21. Obesity (E66.9)   22. Osteoarthritis   23. Urge and stress incontinence (N39.46)   24. Urinary symptom or sign (R39.9)   25. Visit for screening mammogram (Z12.31)    Past Medical History   1. History of Abnormal stool color (R19.5)   2. Denied: History of Asthma   3. History of Axillary Lymph Node Abscess   4. History of Breast Enlargement   5. History of Encounter for other preprocedural examination (Z01.818)   6. History of abdominal pain (Z87.898)   7. History of abdominal pain (Z87.898)   8. History of acute pharyngitis (Z87.09)   9. History of breast disorder (Z87.42)   10. History of constipation (Z87.19)   11. History of hypertrophy of breast (Z87.898)   12. History of influenza vaccination (Z92.29)   13. History of low back pain (Z87.39)   14. History of menstrual disorder (Z87.42)   15. History of urinary tract infection (Z87.440)   16. History of uterine leiomyoma (Z86.018)   17. History of uterine leiomyoma (Z86.018)   18. History of vaginitis (Z87.42)   19. History of Limb pain (M79.609)   20. History of Limb pain (M79.609)   21. History of Myalgia and myositis   22. History of Shoulder disorder (M25.9)    Surgical History   1. History of Hysterectomy   2. Denied: History of Surgery    Family History   1. Denied: Family history of Breast Cancer   2. Denied: Family history of Colon Cancer   3. Denied: Family history of Coronary Artery Disease   4. Family history of diabetes mellitus (Z83.3)   5. Family history of hypertension  (Z82.49)   6. Denied: Family history of Stroke Syndrome    Social History   · Denied: Alcohol   · Never smoker   · Personal history of nicotine dependence (Z87.891)   · Denied: Tobacco Use    Current Meds   1. Ibuprofen 600 MG Oral Tablet; TAKE 1 TABLET TWICE DAILY WITH MEALS;   Therapy: 03Awq0515 to (Evaluate:08Jun2015)  Requested for: [id number]; Last   Rx:09May2015 Ordered    Allergies   1. No Known Drug Allergies    Immunizations  Influenza --- Series1: 19-Jan-2011   PPD --- Series1: 19-Oct-2016     Review  Review: past medical history problem list family medical history surgical history social history      Vitals  Signs   Recorded: 04Oct2018 09:00AM   Height: 5 ft 6 in  Weight: 221 lb 14.24 oz  BMI Calculated: 35.81  BSA Calculated: 2.09  Systolic: 136, LUE, Sitting  Diastolic: 77, LUE, Sitting  Temperature: 98.2 F, Oral  Heart Rate: 63  Respiration: 16  Pain Scale: 00  LMP: stopped    Physical Exam    Constitutional Exam: General appearance: No acute distress, well appearing and well nourished. well developed, appears healthy, comfortable, well nourished, obese, well groomed, rested, well hydrated and appearance reflects stated age. Vital signs reviewed.   Breast Exam: Breasts appear normal. palpation of the breast revealed no masses or adenopathy.   External Genitalia Exam:. External Genitalia appear normal.   Urethra Exam:. Urethral Meatus appears normal.   Vaginal Exam:. vagina appears normal. vaginal pap smear.   Cervix Exam: cervix is absent.   Uterus / Adnexa Exam: uterus is absent. Adnexa appears normal.   Bladder Exam:. palpation of bladder appears normal.   Pulmonary Exam: Respiratory effort: No increased work of breathing or signs of respiratory distress.   Gastrointestinal Exam: Abdomen: Non-tender, no masses.   Lymphatic Exam: Palpation of lymph nodes in axillae: No axillary adenopathy. Palpation of lymph nodes in groin: No inguinal adenopathy.   Skin Exam: Inspection and/or examination of Skin:  Normal without rashes, lesions or ulcers.   Neurologic Exam: Orientation to person, place, and time: Normal. Mood and affect: Normal.      Results/Data    04Oct2018 09:40AM   prc URINE DIPSTICK, AUTO (DEVICE), IN-OFFICE   GLUCOSE U: negative   BILIRUBIN: negative   KETONES: negative   URINE SPEC GRAVITY: 1.025   OCCULT BLOOD: small   PH: 5.5   PROTEIN: negative   UROBILINOGEN: 0.2   NITRITE: negative   LEUKOCYTE ESTERASE: negative     Assessment   1. Urinary symptom or sign (R39.9)   2. Encounter for Pap smear of cervix with HPV DNA cotesting (Z12.4)   3. Hematuria, microscopic (R31.29)    Orders   1. Ibuprofen 600 MG Oral Tablet; TAKE 1 TABLET TWICE DAILY WITH MEALS   2. PAP TEST WITH HIGH RISK HPV; Status:Active; Requested for:04Oct2018;   HISTORY: : HYSTERECTOMY  : VAGINA   3. URINALYSIS SCREEN with MICROSCOPIC IF INDICATED AND URINE CULTURE   REFLEX; Status:Active; Requested for:04Oct2018;   TYPE : CLEAN CATCH/MIDSTREAM  OB & Gyn Plan:   handout from the CDC explaining why douching is harmful and not recommended was given and discussed  UA dip was normal except for finding of microscopic blood. Will send urine to lab for UA and Culture if needed.  GYN Plan:  Refer to Orders   All questions answered   Reviewed pap screening guidelines with patient  Return to the clinic as clinically indicated as discussed with patient who verbalized understanding of and agreement with the plan.   Written handout given.   Breast cancer screening discussed. Advised monthly SBE.   Mammogram ordered  Calcium intake reviewed   Healthy weight discussed.   Diet changes and exercise recommended.   Patient to follow up with PCP for annual exam and screenings.   Safe sex discussed.   Follow up one year or as discussed        Signatures   Electronically signed by : SENG Adame; Oct  4 2018  9:01AM CST    Electronically signed by : KEVIN WALSH NP; Oct  4 2018  1:17PM CST     friend/Jourdan

## 2020-01-23 NOTE — ASU DISCHARGE PLAN (ADULT/PEDIATRIC) - CALL YOUR DOCTOR IF YOU HAVE ANY OF THE FOLLOWING:
Fever greater than (need to indicate Fahrenheit or Celsius) Wound/Surgical Site with redness, or foul smelling discharge or pus/Fever greater than (need to indicate Fahrenheit or Celsius)/Pain not relieved by Medications

## 2020-01-23 NOTE — ASU DISCHARGE PLAN (ADULT/PEDIATRIC) - PAIN MANAGEMENT
Prescriptions electronically submitted to pharmacy from [facility]/Take over the counter pain medication

## 2020-01-24 PROBLEM — C18.1 MALIGNANT NEOPLASM OF APPENDIX: Chronic | Status: ACTIVE | Noted: 2020-01-21

## 2020-01-24 PROBLEM — E11.9 TYPE 2 DIABETES MELLITUS WITHOUT COMPLICATIONS: Chronic | Status: ACTIVE | Noted: 2019-02-08

## 2020-01-29 ENCOUNTER — APPOINTMENT (OUTPATIENT)
Dept: SURGICAL ONCOLOGY | Facility: CLINIC | Age: 60
End: 2020-01-29

## 2020-01-29 VITALS
OXYGEN SATURATION: 98 % | HEART RATE: 86 BPM | BODY MASS INDEX: 23.3 KG/M2 | SYSTOLIC BLOOD PRESSURE: 152 MMHG | TEMPERATURE: 97.1 F | WEIGHT: 145 LBS | HEIGHT: 66 IN | DIASTOLIC BLOOD PRESSURE: 98 MMHG

## 2020-01-30 NOTE — CONSULT LETTER
[Dear  ___] : Dear  [unfilled], [Please see my note below.] : Please see my note below. [Consult Letter:] : I had the pleasure of evaluating your patient, [unfilled]. [Sincerely,] : Sincerely, [Consult Closing:] : Thank you very much for allowing me to participate in the care of this patient.  If you have any questions, please do not hesitate to contact me. [FreeTextEntry3] : Rik Qureshi MD, FICS, FACS\par , Surgical Oncology\par [facility] [employer] [facility]\par [address]\[place]- [address]\par \par [address]\[place]- [address]\par \par [address]\par [place], [place]- [address]\par \par (mob) [phone]\par (o) [phone]\par (f) [phone]\par

## 2020-01-30 NOTE — REASON FOR VISIT
[Post-Op] : a post-op for [FreeTextEntry2] : metastatic mucinous adenocarcinoma ; now s/p ileostomy reversal on 4/25/19 ; s/p mediport placement 1/23/2020

## 2020-01-30 NOTE — HISTORY OF PRESENT ILLNESS
[de-identified] : 59 year old male presents for a post op visit, s/p left ACW mediport placement 1/23/2020.  He will start FOLFIRI under the care of Dr. Hernandez.  Reports soreness at the mediport site which is relieved with Tylenol. Denies fever or chills.   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  Rectal fecal impaction. Enlarged lobulated prostate with intravesicular extension reidentified (recommend urology evaluation), cholelithiasis, \par \par **SURGERY- He is initially s/p exploratory laparotomy, evacuation of 3 L of mucinous ascites, resection of intraabdominal masses, omentectomy, extended right colectomy, splenectomy, intraabdominal debulking of intraabdominal tumor, primary ileocolic anastomosis, creation of diverting loop ileostomy, right ureterolysis and splenic flexure takedown for a pseudomyxoma peritonei and appendiceal mucocele on 2/22/19.  Final pathology showed a [id number] mucinous adenocarcinoma, appendiceal origin.  He is now s/p reversal of loop ileostomy on 4/25/19 with benign pathology.  \par \par \par PERTINENT HISTORY:\par Initially consulted 12/12/18, referred by Dr. Todd Escalona. \par The patient was seen by Dr. Escalona in November for mild discomfort to the RUQ for many years and significant GERD despite PPI treatment.  Patient is from [facility]. \par \par Abdominal ultrasound performed on 11/15/18 showed moderate amount of very complex ascites within the right and left abdomen causing mass effect and scalloping of the capsule of the liver. CT was then recommended. No evidence of cholecystitis. \par \par Upper GI series performed on 11/15/18 demonstrated a small hiatal hernia and severe GERD. \par \par CT A/P performed on 12/11/18 for Ascites revealed -FINDINGS: Limited sections through the lung bases demonstrate mild  bilateral atelectasis. There is a small bleb in the left lower lobe.  There is a dilated fluid-filled structure adjacent to and inseparable  from the cecum measuring up to 4.5 cm in diameter, suggestive of mucocele  of the appendix. Extensive complete free fluid is noted within the  abdomen and pelvis. There is scalloping at the liver capsule. 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 There is a tiny hypodense lesion in the liver dome, too small  to characterize. Nonspecific subcapsular a 9 mm hyperdense focus in  hepatic segment 4.  Gallstones in the gallbladder. The pancreas, spleen, adrenals, and kidney  appear unremarkable.  No bowel obstruction or grossly thickened bowel. Moderate amount of stool  in the rectum.  No evidence for free air, or enlarged lymph node.   The urinary bladder appears unremarkable. The prostate is enlarged.  CT IMPRESSION: Findings are suggestive of mucocele of the appendix with  associated malignant pseudomyxoma peritonei and associated hepatic  capsular metastatic implants.   9 mm nonspecific hyperdense focus in hepatic segment 4. If clinically  indicated, abdominal MR without and with IV contrast may be pursued for  further evaluation.\par \par PMH notable for HTN, HLD, total thyroidectomy for reportedly benign thyroid nodules 2013, DM2 (on Metformin), knee surgery 1996, gallstones since 1998. Social alcohol use.  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 \par \par Family history of malignancies involves his mother with ovarian cancer at age 72. His 1 sister underwent genetic testing and was found to be BRCA negative. He states that same sister also had a benign pancreatic tumor removed.  His other sister was diagnosed with breast cancer at age 38, s/p chemo and surgery and tested positive for BRCA gene. He denies any other family history of malignancies. \par \par States he had a colonoscopy in 2/2017 and had 3 polyps excised. He also reports a history of H. Pylori for which he was treated for and most recent endoscopy showed gastritis but no H. Pylori. \par \par He underwent a screening colonoscopy with Dr. Kate Irby on 1/3/19 where he was found to have a descending colon polyp, cecal polyp and a descending polyp with benign pathology. Cecal and terminal biopsy was also benign. \par \par Today he is with c/o mild/dull, 4/10, intermittent right upper quadrant abdominal pain since 2016. The pain is worse with heavy and fatty meals.  He also repots early satiety, 4 lb weight loss, occasional abdominal bloating. Denies changes in appetite or bowel habits. Denies nausea or vomiting. States he recently retired and his sleeping patterns have been thrown off. He states he now sleeps during the day and stays up a lot of the night. 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  \par \par Final pathology:\par 1) Falciform ligament excision: Metastatic low grade mucinous adenocarcinoma (M1)\par 2) Peritoneum, omentum, resection: Metastatic low grade mucinous adenocarcinoma (M1)\par 3) Spleen: Perisplenic soft tissue with metastatic low grade mucinous adenocarcinoma, splenic parenchyma is uninvolved. 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 \par TUMOR STAGE: [id number]\par -Postoperative course complicated by an ileus, resolved after NGT re-insertion. \par \par He consulted with Dr. Hernandez on 4/10/19. \par Labs 4/10/19: CEA (69); Hep A&B Negative; Hepatitis C reactive (1.78 H); TSH WNL; LFT's slightly elevated; Platelets (660 H)\par \par Xray Barium Enema performed on 4/11/19 with unremarkable findings, specifically no evidence of a leak. \par \par  s/p reversal of loop ileostomy on 4/25/19 with benign pathology. \par \par CT A/P 8/30/19- IMPRESSION:  Stable soft tissue implants throughout the abdomen and pelvis.  Status post reversal of right lower quadrant colostomy. Status post right hemicolectomy.  Cholelithiasis.\par \par BW 10/16/19:  (205) ; CEA (78.7); Hgb and Hct WNL

## 2020-01-30 NOTE — ASSESSMENT
[FreeTextEntry1] : IMP:\par CT revealing Mucocele of the appendix with  associated  pseudomyxoma peritonei and associated hepatic  capsular implants. 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  \par \par Final pathology:\par 1) Falciform ligament excision: Metastatic low grade mucinous adenocarcinoma (M1)\par 2) Peritoneum, omentum, resection: Metastatic low grade mucinous adenocarcinoma (M1)\par 3) Spleen: Perisplenic soft tissue with metastatic low grade mucinous adenocarcinoma, splenic parenchyma is uninvolved. 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 \par TUMOR STAGE: [id number]\par \par Chemotherapy under the care of Dr. Hernandez. \par Now s/p reversal of loop ileostomy on 4/25/19.  Benign pathology. 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  Rectal fecal impaction. Enlarged lobulated prostate with intravesicular extension reidentified (recommend urology evaluation), cholelithiasis, \par S/p left ACW mediport placement 1/23/2020.  He will start FOLFIRI under the care of Dr. Hernandez.  \par No evidence of infection \par \par PLAN:\par 1) Chemo and surveillance imaging as per \par 2) RTO 6 months \par

## 2020-01-30 NOTE — PHYSICAL EXAM
[Normal] : supple, no neck mass and thyroid not enlarged [Normal Neck Lymph Nodes] : normal neck lymph nodes  [Normal Supraclavicular Lymph Nodes] : normal supraclavicular lymph nodes [Normal] : oriented to person, place and time, with appropriate affect [de-identified] : soft, ND, NT; healed midline incision; healed RLQ ostomy site ; no masses or hernias  [de-identified] : left ACW mediport site with no evidence of infection

## 2020-02-26 DIAGNOSIS — Z95.828 PRESENCE OF OTHER VASCULAR IMPLANTS AND GRAFTS: ICD-10-CM

## 2020-07-14 ENCOUNTER — OUTPATIENT (OUTPATIENT)
Dept: OUTPATIENT SERVICES | Facility: HOSPITAL | Age: 60
LOS: 1 days | End: 2020-07-14
Payer: COMMERCIAL

## 2020-07-14 ENCOUNTER — APPOINTMENT (OUTPATIENT)
Dept: CT IMAGING | Facility: HOSPITAL | Age: 60
End: 2020-07-14
Payer: COMMERCIAL

## 2020-07-14 DIAGNOSIS — Z98.890 OTHER SPECIFIED POSTPROCEDURAL STATES: Chronic | ICD-10-CM

## 2020-07-14 DIAGNOSIS — E78.5 HYPERLIPIDEMIA, UNSPECIFIED: ICD-10-CM

## 2020-07-14 DIAGNOSIS — E11.9 TYPE 2 DIABETES MELLITUS WITHOUT COMPLICATIONS: ICD-10-CM

## 2020-07-14 DIAGNOSIS — D64.9 ANEMIA, UNSPECIFIED: ICD-10-CM

## 2020-07-14 DIAGNOSIS — C18.1 MALIGNANT NEOPLASM OF APPENDIX: ICD-10-CM

## 2020-07-14 PROCEDURE — 74177 CT ABD & PELVIS W/CONTRAST: CPT

## 2020-07-14 PROCEDURE — 74177 CT ABD & PELVIS W/CONTRAST: CPT | Mod: 26

## 2020-07-29 ENCOUNTER — APPOINTMENT (OUTPATIENT)
Dept: SURGICAL ONCOLOGY | Facility: CLINIC | Age: 60
End: 2020-07-29

## 2021-01-25 ENCOUNTER — OUTPATIENT (OUTPATIENT)
Dept: OUTPATIENT SERVICES | Facility: HOSPITAL | Age: 61
LOS: 1 days | End: 2021-01-25
Payer: COMMERCIAL

## 2021-01-25 ENCOUNTER — APPOINTMENT (OUTPATIENT)
Dept: CT IMAGING | Facility: HOSPITAL | Age: 61
End: 2021-01-25
Payer: COMMERCIAL

## 2021-01-25 DIAGNOSIS — D50.9 IRON DEFICIENCY ANEMIA, UNSPECIFIED: ICD-10-CM

## 2021-01-25 DIAGNOSIS — Z98.890 OTHER SPECIFIED POSTPROCEDURAL STATES: Chronic | ICD-10-CM

## 2021-01-25 PROCEDURE — 74177 CT ABD & PELVIS W/CONTRAST: CPT

## 2021-01-25 PROCEDURE — 74177 CT ABD & PELVIS W/CONTRAST: CPT | Mod: 26

## 2021-02-24 ENCOUNTER — APPOINTMENT (OUTPATIENT)
Dept: CT IMAGING | Facility: HOSPITAL | Age: 61
End: 2021-02-24
Payer: COMMERCIAL

## 2021-02-24 ENCOUNTER — OUTPATIENT (OUTPATIENT)
Dept: OUTPATIENT SERVICES | Facility: HOSPITAL | Age: 61
LOS: 1 days | End: 2021-02-24
Payer: COMMERCIAL

## 2021-02-24 DIAGNOSIS — D50.9 IRON DEFICIENCY ANEMIA, UNSPECIFIED: ICD-10-CM

## 2021-02-24 DIAGNOSIS — Z98.890 OTHER SPECIFIED POSTPROCEDURAL STATES: Chronic | ICD-10-CM

## 2021-02-24 DIAGNOSIS — D64.9 ANEMIA, UNSPECIFIED: ICD-10-CM

## 2021-02-24 DIAGNOSIS — C18.1 MALIGNANT NEOPLASM OF APPENDIX: ICD-10-CM

## 2021-02-24 DIAGNOSIS — R63.4 ABNORMAL WEIGHT LOSS: ICD-10-CM

## 2021-02-24 DIAGNOSIS — G89.3 NEOPLASM RELATED PAIN (ACUTE) (CHRONIC): ICD-10-CM

## 2021-02-24 PROCEDURE — 71250 CT THORAX DX C-: CPT | Mod: 26

## 2021-02-24 PROCEDURE — 71250 CT THORAX DX C-: CPT

## 2021-03-23 NOTE — ASSESSMENT
[FreeTextEntry1] : IMP:\par s/p exploratory laparotomy, evacuation of 3 L of mucinous ascites, resection of intraabdominal masses, omentectomy, extended right colectomy, splenectomy, intraabdominal debulking of intraabdominal tumor, primary ileocolic anastomosis, creation of diverting loop ileostomy, right ureterolysis and splenic flexure takedown for a pseudomyxoma peritonei and appendiceal mucocele on 2/22/19.  \par \par Final pathology:\par 1) Falciform ligament excision: Metastatic low grade mucinous adenocarcinoma (M1)\par 2) Peritoneum, omentum, resection: Metastatic low grade mucinous adenocarcinoma (M1)\par 3) Spleen: Perisplenic soft tissue with metastatic low grade mucinous adenocarcinoma, splenic parenchyma is uninvolved. 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 \par TUMOR STAGE: [id number]\par \par s/p reversal of loop ileostomy on 4/25/19.  Benign pathology. \par s/p mediport placement 1/23/2020.  \par Chemotherapy under the care of Dr. Hernandez. \par \par \par PLAN:\par 1) Chemo and surveillance imaging as per \par 2) RTO 6 months

## 2021-03-23 NOTE — REASON FOR VISIT
[facility]  Vaginal Delivery Note    Delivery     Delivery: Vaginal, Spontaneous Delivery     YOB: 2019    Time of Birth: 4:29 PM      Anesthesia: Epidural     Delivering clinician: Maynor Romero    Forceps?   No   Vacuum? No    Shoulder dystocia present: No        Delivery narrative:  Viable female infant delivered over intact perineum    Infant    Findings: female  infant     Infant observations: Weight: 3280 g (7 lb 3.7 oz)   Length: 19.5  in  Observations/Comments:         Apgars: 9   @ 1 minute /    9   @ 5 minutes   Infant Name: unknown     Placenta, Cord, and Fluid    Placenta delivered  Spontaneous  at   12/9  4:38 PM     Cord: 3 vessels  present.   Nuchal Cord?  yes; Number of nuchal loops present:    1     Cord blood obtained: Yes    Cord gases obtained:  No    Cord gas results: Venous:  No results found for: PHCVEN    Arterial:  No results found for: PHCART     Repair    Episiotomy: None    Lacerations: No   Estimated Blood Loss: Est. Blood Loss (mL): 200 mL (Filed from Delivery Summary) (12/09/17 [id number])     Suture used for repair: n/a     Complications  none    Disposition  Mother to Mother Baby/Postpartum  in stable condition currently.  Baby to remains with mom  in stable condition currently.      Maynor Romero MD  12/09/17  4:44 PM       [Follow-Up Visit] : a follow-up visit for [FreeTextEntry2] : metastatic mucinous adenocarcinoma of the appendix

## 2021-03-23 NOTE — HISTORY OF PRESENT ILLNESS
[de-identified] : 60 year old male presents for a follow up visit.  Initially consulted 12/12/18, referred by Dr. Todd Escalona. \par The patient was seen by Dr. Escalona in November 2018 for mild discomfort to the RUQ for many years and significant GERD despite PPI treatment.  [place] [facility] [place]. \par \par Abdominal ultrasound performed on 11/15/18 showed moderate amount of very complex ascites within the right and left abdomen causing mass effect and scalloping of the capsule of the liver. CT was then recommended. No evidence of cholecystitis. \par \par Upper GI series performed on 11/15/18 demonstrated a small hiatal hernia and severe GERD. \par \par CT A/P performed on 12/11/18 for Ascites revealed -FINDINGS: Limited sections through the lung bases demonstrate mild  bilateral atelectasis. There is a small bleb in the left lower lobe.  There is a dilated fluid-filled structure adjacent to and inseparable  from the cecum measuring up to 4.5 cm in diameter, suggestive of mucocele  of the appendix. Extensive complete free fluid is noted within the  abdomen and pelvis. There is scalloping at the liver capsule. 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 There is a tiny hypodense lesion in the liver dome, too small  to characterize. Nonspecific subcapsular a 9 mm hyperdense focus in  hepatic segment 4.  Gallstones in the gallbladder. The pancreas, spleen, adrenals, and kidney  appear unremarkable.  No bowel obstruction or grossly thickened bowel. Moderate amount of stool  in the rectum.  No evidence for free air, or enlarged lymph node.   The urinary bladder appears unremarkable. The prostate is enlarged.  CT IMPRESSION: Findings are suggestive of mucocele of the appendix with  associated malignant pseudomyxoma peritonei and associated hepatic  capsular metastatic implants.   9 mm nonspecific hyperdense focus in hepatic segment 4. If clinically  indicated, abdominal MR without and with IV contrast may be pursued for  further evaluation.\par \par PMH notable for HTN, HLD, total thyroidectomy for reportedly benign thyroid nodules 2013, DM2 (on Metformin), knee surgery 1996, gallstones since 1998. Social alcohol use.  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 \par \par Family history of malignancies involves his mother with ovarian cancer at age 72. His 1 sister underwent genetic testing and was found to be BRCA negative. He states that same sister also had a benign pancreatic tumor removed.  His other sister was diagnosed with breast cancer at age 38, s/p chemo and surgery and tested positive for BRCA gene. He denies any other family history of malignancies. \par \par He underwent a screening colonoscopy with Dr. Kate Irby on 1/3/19 where he was found to have a descending colon polyp, cecal polyp and a descending polyp with benign pathology. Cecal and terminal biopsy was also benign. \par \par Today he is with c/o mild/dull, 4/10, intermittent right upper quadrant abdominal pain since 2016. The pain is worse with heavy and fatty meals.  He also repots early satiety, 4 lb weight loss, occasional abdominal bloating. Denies changes in appetite or bowel habits. Denies nausea or vomiting. States he recently retired and his sleeping patterns have been thrown off. He states he now sleeps during the day and stays up a lot of the night. 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  \par \par Final pathology:\par 1) Falciform ligament excision: Metastatic low grade mucinous adenocarcinoma (M1)\par 2) Peritoneum, omentum, resection: Metastatic low grade mucinous adenocarcinoma (M1)\par 3) Spleen: Perisplenic soft tissue with metastatic low grade mucinous adenocarcinoma, splenic parenchyma is uninvolved. 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 \par TUMOR STAGE: [id number]\par -Postoperative course complicated by an ileus, resolved after NGT re-insertion. \par \par He consulted with Dr. Hernandez on 4/10/19. \par Labs 4/10/19: CEA (69); Hep A&B Negative; Hepatitis C reactive (1.78 H); TSH WNL; LFT's slightly elevated; Platelets (660 H)\par \par Xray Barium Enema performed on 4/11/19 with unremarkable findings, specifically no evidence of a leak. \par \par  s/p reversal of loop ileostomy on 4/25/19 with benign pathology. \par \par CT A/P 8/30/19- IMPRESSION:  Stable soft tissue implants throughout the abdomen and pelvis.  Status post reversal of right lower quadrant colostomy. Status post right hemicolectomy.  Cholelithiasis.\par \par BW 10/16/19:  (205) ; CEA (78.7); Hgb and Hct WNL\par \par s/p left ACW mediport placement 1/23/2020.

## 2021-07-09 ENCOUNTER — OUTPATIENT (OUTPATIENT)
Dept: OUTPATIENT SERVICES | Facility: HOSPITAL | Age: 61
LOS: 1 days | End: 2021-07-09
Payer: MEDICARE

## 2021-07-09 ENCOUNTER — APPOINTMENT (OUTPATIENT)
Dept: CT IMAGING | Facility: HOSPITAL | Age: 61
End: 2021-07-09
Payer: COMMERCIAL

## 2021-07-09 DIAGNOSIS — Z98.890 OTHER SPECIFIED POSTPROCEDURAL STATES: Chronic | ICD-10-CM

## 2021-07-09 DIAGNOSIS — C18.1 MALIGNANT NEOPLASM OF APPENDIX: ICD-10-CM

## 2021-07-09 DIAGNOSIS — D64.9 ANEMIA, UNSPECIFIED: ICD-10-CM

## 2021-07-09 DIAGNOSIS — D50.9 IRON DEFICIENCY ANEMIA, UNSPECIFIED: ICD-10-CM

## 2021-07-09 PROCEDURE — 74177 CT ABD & PELVIS W/CONTRAST: CPT | Mod: 26

## 2021-07-09 PROCEDURE — 71260 CT THORAX DX C+: CPT | Mod: 26

## 2021-07-09 PROCEDURE — 74177 CT ABD & PELVIS W/CONTRAST: CPT

## 2021-07-09 PROCEDURE — 71260 CT THORAX DX C+: CPT

## 2021-07-15 NOTE — PATIENT PROFILE ADULT - FALL HARM RISK
07/15/21  Screened by: Courtney Lopez     Referring Provider [email or web address]     Pre- Screening: There is no height or weight on file to calculate BMI  Has patient been referred for a routine screening Colonoscopy? no  Is the patient between 39-70 years old? no      Previous Colonoscopy yes   If yes:    Date: 2016    Facility: [facility]    Reason: personal history of colon polyps and family history of colon polyps      SCHEDULING STAFF: If the patient is between 39yrs-47yrs, please advise patient to confirm benefits/coverage with their insurance company for a routine screening colonoscopy, some insurance carriers will only cover at [facility] or older  If the patient is over 66years old, please schedule an office visit  Does the patient want to see a Gastroenterologist prior to their procedure OR are they having any GI symptoms? no    Has the patient been hospitalized or had abdominal surgery in the past 6 months? no    Does the patient use supplemental oxygen? no    Does the patient take Coumadin, Lovenox, Plavix, Elliquis, Xarelto, or other blood thinning medication? no    Has the patient had a stroke, cardiac event, or stent placed in the past year? no    SCHEDULING STAFF: If patient answers NO to above questions, then schedule procedure  If patient answers YES to above questions, then schedule office appointment  If patient is between 45yrs - 49yrs, please advise patient that we will have to confirm benefits & coverage with their insurance company for a routine screening colonoscopy 
other

## 2021-10-27 ENCOUNTER — OUTPATIENT (OUTPATIENT)
Dept: OUTPATIENT SERVICES | Facility: HOSPITAL | Age: 61
LOS: 1 days | End: 2021-10-27
Payer: MEDICARE

## 2021-10-27 ENCOUNTER — APPOINTMENT (OUTPATIENT)
Dept: CT IMAGING | Facility: HOSPITAL | Age: 61
End: 2021-10-27

## 2021-10-27 DIAGNOSIS — C18.1 MALIGNANT NEOPLASM OF APPENDIX: ICD-10-CM

## 2021-10-27 DIAGNOSIS — Z98.890 OTHER SPECIFIED POSTPROCEDURAL STATES: Chronic | ICD-10-CM

## 2021-10-27 DIAGNOSIS — D50.9 IRON DEFICIENCY ANEMIA, UNSPECIFIED: ICD-10-CM

## 2021-10-27 DIAGNOSIS — D64.9 ANEMIA, UNSPECIFIED: ICD-10-CM

## 2021-10-27 PROCEDURE — 71260 CT THORAX DX C+: CPT | Mod: 26,MH

## 2021-10-27 PROCEDURE — 74177 CT ABD & PELVIS W/CONTRAST: CPT | Mod: MH

## 2021-10-27 PROCEDURE — 74177 CT ABD & PELVIS W/CONTRAST: CPT | Mod: 26,MH

## 2021-10-27 PROCEDURE — 71260 CT THORAX DX C+: CPT | Mod: MH

## 2022-01-26 ENCOUNTER — OUTPATIENT (OUTPATIENT)
Dept: OUTPATIENT SERVICES | Facility: HOSPITAL | Age: 62
LOS: 1 days | End: 2022-01-26
Payer: MEDICARE

## 2022-01-26 ENCOUNTER — APPOINTMENT (OUTPATIENT)
Dept: CT IMAGING | Facility: HOSPITAL | Age: 62
End: 2022-01-26
Payer: MEDICARE

## 2022-01-26 ENCOUNTER — APPOINTMENT (OUTPATIENT)
Dept: ULTRASOUND IMAGING | Facility: HOSPITAL | Age: 62
End: 2022-01-26
Payer: MEDICARE

## 2022-01-26 DIAGNOSIS — D64.9 ANEMIA, UNSPECIFIED: ICD-10-CM

## 2022-01-26 DIAGNOSIS — Z98.890 OTHER SPECIFIED POSTPROCEDURAL STATES: Chronic | ICD-10-CM

## 2022-01-26 DIAGNOSIS — C18.1 MALIGNANT NEOPLASM OF APPENDIX: ICD-10-CM

## 2022-01-26 DIAGNOSIS — D50.9 IRON DEFICIENCY ANEMIA, UNSPECIFIED: ICD-10-CM

## 2022-01-26 PROCEDURE — 76700 US EXAM ABDOM COMPLETE: CPT | Mod: 26

## 2022-01-26 PROCEDURE — 74177 CT ABD & PELVIS W/CONTRAST: CPT | Mod: 26,MH

## 2022-01-26 PROCEDURE — 71260 CT THORAX DX C+: CPT | Mod: MH

## 2022-01-26 PROCEDURE — 71260 CT THORAX DX C+: CPT | Mod: 26,MH

## 2022-01-26 PROCEDURE — 74177 CT ABD & PELVIS W/CONTRAST: CPT | Mod: MH

## 2022-01-26 PROCEDURE — 76700 US EXAM ABDOM COMPLETE: CPT

## 2022-02-13 ENCOUNTER — EMERGENCY (EMERGENCY)
Facility: HOSPITAL | Age: 62
LOS: 1 days | Discharge: HOME | End: 2022-02-13
Attending: EMERGENCY MEDICINE
Payer: MEDICARE

## 2022-02-13 VITALS
DIASTOLIC BLOOD PRESSURE: 80 MMHG | OXYGEN SATURATION: 97 % | RESPIRATION RATE: 18 BRPM | TEMPERATURE: 98 F | HEIGHT: 66 IN | HEART RATE: 116 BPM | SYSTOLIC BLOOD PRESSURE: 130 MMHG | WEIGHT: 133.6 LBS

## 2022-02-13 VITALS
OXYGEN SATURATION: 96 % | RESPIRATION RATE: 16 BRPM | DIASTOLIC BLOOD PRESSURE: 87 MMHG | TEMPERATURE: 98 F | HEART RATE: 96 BPM | SYSTOLIC BLOOD PRESSURE: 129 MMHG

## 2022-02-13 DIAGNOSIS — Z98.890 OTHER SPECIFIED POSTPROCEDURAL STATES: Chronic | ICD-10-CM

## 2022-02-13 LAB
ALBUMIN SERPL ELPH-MCNC: 2.3 G/DL — LOW (ref 3.5–5)
ALP SERPL-CCNC: 164 U/L — HIGH (ref 40–120)
ALT FLD-CCNC: 26 U/L DA — SIGNIFICANT CHANGE UP (ref 10–60)
ANION GAP SERPL CALC-SCNC: 9 MMOL/L — SIGNIFICANT CHANGE UP (ref 5–17)
APPEARANCE UR: CLEAR — SIGNIFICANT CHANGE UP
AST SERPL-CCNC: 30 U/L — SIGNIFICANT CHANGE UP (ref 10–40)
BASOPHILS # BLD AUTO: 0.07 K/UL — SIGNIFICANT CHANGE UP (ref 0–0.2)
BASOPHILS NFR BLD AUTO: 0.5 % — SIGNIFICANT CHANGE UP (ref 0–2)
BILIRUB SERPL-MCNC: 0.5 MG/DL — SIGNIFICANT CHANGE UP (ref 0.2–1.2)
BILIRUB UR-MCNC: NEGATIVE — SIGNIFICANT CHANGE UP
BUN SERPL-MCNC: 13 MG/DL — SIGNIFICANT CHANGE UP (ref 7–18)
CALCIUM SERPL-MCNC: 9.6 MG/DL — SIGNIFICANT CHANGE UP (ref 8.4–10.5)
CHLORIDE SERPL-SCNC: 101 MMOL/L — SIGNIFICANT CHANGE UP (ref 96–108)
CO2 SERPL-SCNC: 24 MMOL/L — SIGNIFICANT CHANGE UP (ref 22–31)
COLOR SPEC: YELLOW — SIGNIFICANT CHANGE UP
CREAT SERPL-MCNC: 0.72 MG/DL — SIGNIFICANT CHANGE UP (ref 0.5–1.3)
DIFF PNL FLD: NEGATIVE — SIGNIFICANT CHANGE UP
EOSINOPHIL # BLD AUTO: 0.21 K/UL — SIGNIFICANT CHANGE UP (ref 0–0.5)
EOSINOPHIL NFR BLD AUTO: 1.5 % — SIGNIFICANT CHANGE UP (ref 0–6)
GLUCOSE SERPL-MCNC: 122 MG/DL — HIGH (ref 70–99)
GLUCOSE UR QL: NEGATIVE — SIGNIFICANT CHANGE UP
HCT VFR BLD CALC: 30.5 % — LOW (ref 39–50)
HGB BLD-MCNC: 9.1 G/DL — LOW (ref 13–17)
IMM GRANULOCYTES NFR BLD AUTO: 0.4 % — SIGNIFICANT CHANGE UP (ref 0–1.5)
KETONES UR-MCNC: ABNORMAL
LEUKOCYTE ESTERASE UR-ACNC: NEGATIVE — SIGNIFICANT CHANGE UP
LIDOCAIN IGE QN: 39 U/L — LOW (ref 73–393)
LYMPHOCYTES # BLD AUTO: 15.2 % — SIGNIFICANT CHANGE UP (ref 13–44)
LYMPHOCYTES # BLD AUTO: 2.15 K/UL — SIGNIFICANT CHANGE UP (ref 1–3.3)
MCHC RBC-ENTMCNC: 21.8 PG — LOW (ref 27–34)
MCHC RBC-ENTMCNC: 29.8 GM/DL — LOW (ref 32–36)
MCV RBC AUTO: 73 FL — LOW (ref 80–100)
MONOCYTES # BLD AUTO: 1.22 K/UL — HIGH (ref 0–0.9)
MONOCYTES NFR BLD AUTO: 8.6 % — SIGNIFICANT CHANGE UP (ref 2–14)
NEUTROPHILS # BLD AUTO: 10.45 K/UL — HIGH (ref 1.8–7.4)
NEUTROPHILS NFR BLD AUTO: 73.8 % — SIGNIFICANT CHANGE UP (ref 43–77)
NITRITE UR-MCNC: NEGATIVE — SIGNIFICANT CHANGE UP
NRBC # BLD: 6 /100 WBCS — HIGH (ref 0–0)
PH UR: 6.5 — SIGNIFICANT CHANGE UP (ref 5–8)
PLATELET # BLD AUTO: 741 K/UL — HIGH (ref 150–400)
POTASSIUM SERPL-MCNC: 4.5 MMOL/L — SIGNIFICANT CHANGE UP (ref 3.5–5.3)
POTASSIUM SERPL-SCNC: 4.5 MMOL/L — SIGNIFICANT CHANGE UP (ref 3.5–5.3)
PROT SERPL-MCNC: 9 G/DL — HIGH (ref 6–8.3)
PROT UR-MCNC: 30 MG/DL
RBC # BLD: 4.18 M/UL — LOW (ref 4.2–5.8)
RBC # FLD: 21 % — HIGH (ref 10.3–14.5)
SARS-COV-2 RNA SPEC QL NAA+PROBE: SIGNIFICANT CHANGE UP
SODIUM SERPL-SCNC: 134 MMOL/L — LOW (ref 135–145)
SP GR SPEC: 1.01 — SIGNIFICANT CHANGE UP (ref 1.01–1.02)
UROBILINOGEN FLD QL: NEGATIVE — SIGNIFICANT CHANGE UP
WBC # BLD: 14.15 K/UL — HIGH (ref 3.8–10.5)
WBC # FLD AUTO: 14.15 K/UL — HIGH (ref 3.8–10.5)

## 2022-02-13 PROCEDURE — 96361 HYDRATE IV INFUSION ADD-ON: CPT

## 2022-02-13 PROCEDURE — 96375 TX/PRO/DX INJ NEW DRUG ADDON: CPT

## 2022-02-13 PROCEDURE — 74177 CT ABD & PELVIS W/CONTRAST: CPT | Mod: ME

## 2022-02-13 PROCEDURE — 96374 THER/PROPH/DIAG INJ IV PUSH: CPT | Mod: XU

## 2022-02-13 PROCEDURE — 87635 SARS-COV-2 COVID-19 AMP PRB: CPT

## 2022-02-13 PROCEDURE — 99284 EMERGENCY DEPT VISIT MOD MDM: CPT | Mod: 25

## 2022-02-13 PROCEDURE — 87186 SC STD MICRODIL/AGAR DIL: CPT

## 2022-02-13 PROCEDURE — G1004: CPT

## 2022-02-13 PROCEDURE — 99285 EMERGENCY DEPT VISIT HI MDM: CPT

## 2022-02-13 PROCEDURE — 81001 URINALYSIS AUTO W/SCOPE: CPT

## 2022-02-13 PROCEDURE — 74177 CT ABD & PELVIS W/CONTRAST: CPT | Mod: 26,ME

## 2022-02-13 PROCEDURE — 85025 COMPLETE CBC W/AUTO DIFF WBC: CPT

## 2022-02-13 PROCEDURE — 80053 COMPREHEN METABOLIC PANEL: CPT

## 2022-02-13 PROCEDURE — 96376 TX/PRO/DX INJ SAME DRUG ADON: CPT

## 2022-02-13 PROCEDURE — 83690 ASSAY OF LIPASE: CPT

## 2022-02-13 PROCEDURE — 87086 URINE CULTURE/COLONY COUNT: CPT

## 2022-02-13 PROCEDURE — 36415 COLL VENOUS BLD VENIPUNCTURE: CPT

## 2022-02-13 RX ORDER — ONDANSETRON 8 MG/1
1 TABLET, FILM COATED ORAL
Qty: 10 | Refills: 0
Start: 2022-02-13

## 2022-02-13 RX ORDER — ONDANSETRON 8 MG/1
4 TABLET, FILM COATED ORAL ONCE
Refills: 0 | Status: COMPLETED | OUTPATIENT
Start: 2022-02-13 | End: 2022-02-13

## 2022-02-13 RX ORDER — POLYETHYLENE GLYCOL 3350 17 G/17G
1 POWDER, FOR SOLUTION ORAL
Qty: 1 | Refills: 0
Start: 2022-02-13

## 2022-02-13 RX ORDER — MORPHINE SULFATE 50 MG/1
4 CAPSULE, EXTENDED RELEASE ORAL ONCE
Refills: 0 | Status: DISCONTINUED | OUTPATIENT
Start: 2022-02-13 | End: 2022-02-13

## 2022-02-13 RX ORDER — SODIUM CHLORIDE 9 MG/ML
1000 INJECTION INTRAMUSCULAR; INTRAVENOUS; SUBCUTANEOUS ONCE
Refills: 0 | Status: COMPLETED | OUTPATIENT
Start: 2022-02-13 | End: 2022-02-13

## 2022-02-13 RX ORDER — IOHEXOL 300 MG/ML
30 INJECTION, SOLUTION INTRAVENOUS ONCE
Refills: 0 | Status: COMPLETED | OUTPATIENT
Start: 2022-02-13 | End: 2022-02-13

## 2022-02-13 RX ORDER — OXYCODONE AND ACETAMINOPHEN 5; 325 MG/1; MG/1
2 TABLET ORAL ONCE
Refills: 0 | Status: DISCONTINUED | OUTPATIENT
Start: 2022-02-13 | End: 2022-02-13

## 2022-02-13 RX ADMIN — ONDANSETRON 4 MILLIGRAM(S): 8 TABLET, FILM COATED ORAL at 18:36

## 2022-02-13 RX ADMIN — OXYCODONE AND ACETAMINOPHEN 2 TABLET(S): 5; 325 TABLET ORAL at 22:14

## 2022-02-13 RX ADMIN — ONDANSETRON 4 MILLIGRAM(S): 8 TABLET, FILM COATED ORAL at 19:26

## 2022-02-13 RX ADMIN — SODIUM CHLORIDE 1000 MILLILITER(S): 9 INJECTION INTRAMUSCULAR; INTRAVENOUS; SUBCUTANEOUS at 18:36

## 2022-02-13 RX ADMIN — SODIUM CHLORIDE 1000 MILLILITER(S): 9 INJECTION INTRAMUSCULAR; INTRAVENOUS; SUBCUTANEOUS at 22:05

## 2022-02-13 RX ADMIN — SODIUM CHLORIDE 1000 MILLILITER(S): 9 INJECTION INTRAMUSCULAR; INTRAVENOUS; SUBCUTANEOUS at 17:56

## 2022-02-13 RX ADMIN — IOHEXOL 30 MILLILITER(S): 300 INJECTION, SOLUTION INTRAVENOUS at 18:36

## 2022-02-13 RX ADMIN — SODIUM CHLORIDE 1000 MILLILITER(S): 9 INJECTION INTRAMUSCULAR; INTRAVENOUS; SUBCUTANEOUS at 19:13

## 2022-02-13 RX ADMIN — MORPHINE SULFATE 4 MILLIGRAM(S): 50 CAPSULE, EXTENDED RELEASE ORAL at 22:05

## 2022-02-13 RX ADMIN — MORPHINE SULFATE 4 MILLIGRAM(S): 50 CAPSULE, EXTENDED RELEASE ORAL at 19:13

## 2022-02-13 RX ADMIN — MORPHINE SULFATE 4 MILLIGRAM(S): 50 CAPSULE, EXTENDED RELEASE ORAL at 18:36

## 2022-02-13 RX ADMIN — MORPHINE SULFATE 4 MILLIGRAM(S): 50 CAPSULE, EXTENDED RELEASE ORAL at 20:14

## 2022-02-13 NOTE — ED ADULT NURSE NOTE - TEMPLATE LIST FOR HEAD TO TOE ASSESSMENT
General Dupixent Pregnancy And Lactation Text: This medication likely crosses the placenta but the risk for the fetus is uncertain. This medication is excreted in breast milk.

## 2022-02-13 NOTE — ED PROVIDER NOTE - OBJECTIVE STATEMENT
Patient reports abdominal pain and nausea for 3 days. Sees [facility] for mucoid tumors of the abdomen. Told it is not treatable. No fever, cp, sob, vomiting, diarrhea.

## 2022-02-13 NOTE — ED PROVIDER NOTE - PROGRESS NOTE DETAILS
Patient vomited after drinking contrast and after 2 doses of zofran. Patient drank 8 ounces of water, feels much better. Pain became mild after 2nd dose of morphine but now getting worse again. CTAP not significantly changed from prior. I offered admission. Patient declined. Patient said he is chronically constipated, usually needs to use a suppository to have BM. I explained that narcotic pain medicines will cause more constipation. I will rx miralax. Patient to f/u with his oncologist tomorrow. Return to the ED immediately if getting worse, not improving, or if having any new or troubling symptoms.

## 2022-02-13 NOTE — ED PROVIDER NOTE - PATIENT PORTAL LINK FT
You can access the FollowMyHealth Patient Portal offered by [facility] by registering at the following website: [email or web address]. By joining [employer]’s FollowMyHealth portal, you will also be able to view your health information using other applications (apps) compatible with our system.

## 2022-02-13 NOTE — ED PROVIDER NOTE - NSFOLLOWUPINSTRUCTIONS_ED_ALL_ED_FT
Abdominal Pain    WHAT YOU NEED TO KNOW:    Abdominal pain can be dull, achy, or sharp. You may have pain in one area of your abdomen, or in your entire abdomen. Your pain may be caused by a condition such as constipation, food sensitivity or poisoning, infection, or a blockage. Abdominal pain can also be from a hernia, appendicitis, or an ulcer. Liver, gallbladder, or kidney conditions can also cause abdominal pain. The cause of your abdominal pain may not be known.    Abdominal Organs         DISCHARGE INSTRUCTIONS:    Call your local emergency number (911 in the [place]) if:   •You have new chest pain or shortness of breath.          Return to the emergency department if:   •You have pulsing pain in your upper abdomen or lower back that suddenly becomes constant.      •Your pain is in the right lower abdominal area and worsens with movement.      •You have a fever over 100.4°F (38°C) or shaking chills.      •You are vomiting and cannot keep food or liquids down.      •Your pain does not improve or gets worse over the next 8 to 12 hours.      •You see blood in your vomit or bowel movements, or they look black and tarry.      •Your skin or the whites of your eyes turn yellow.      •You are a woman and have a large amount of vaginal bleeding that is not your monthly period.      Call your doctor if:   •You have pain in your lower back.      •You are a man and have pain in your testicles.      •You have pain when you urinate.      •You have questions or concerns about your condition or care.      Medicines:   •Prescription pain medicine may be given. Ask your healthcare provider how to take this medicine safely. Some prescription pain medicines contain acetaminophen. Do not take other medicines that contain acetaminophen without talking to your healthcare provider. Too much acetaminophen may cause liver damage. Prescription pain medicine may cause constipation. Ask your healthcare provider how to prevent or treat constipation.       •Medicines may be given to calm your stomach or prevent vomiting.      •Take your medicine as directed. Contact your healthcare provider if you think your medicine is not helping or if you have side effects. Tell him of her if you are allergic to any medicine. Keep a list of the medicines, vitamins, and herbs you take. Include the amounts, and when and why you take them. Bring the list or the pill bottles to follow-up visits. Carry your medicine list with you in case of an emergency.      Manage your symptoms:   •Apply heat on your abdomen for 20 to 30 minutes every 2 hours for as many days as directed. Heat helps decrease pain and muscle spasms.      •Make changes to the food you eat, if needed. Do not eat foods that cause abdominal pain or other symptoms. Eat small meals more often. The following changes may also help:?Eat more high-fiber foods if you are constipated. High-fiber foods include fruits, vegetables, whole-grain foods, and legumes.             ?Do not eat foods that cause gas if you have bloating. Examples include broccoli, cabbage, and cauliflower. Do not drink soda or carbonated drinks. These may also cause gas.      ?Do not eat foods or drinks that contain sorbitol or fructose if you have diarrhea and bloating. Some examples are fruit juices, candy, jelly, and sugar-free gum.      ?Do not eat high-fat foods. Examples include fried foods, cheeseburgers, hot dogs, and desserts.      ?Limit or do not have caffeine. Caffeine may make symptoms such as heartburn or nausea worse.      ?Drink more liquids to prevent dehydration from diarrhea or vomiting. Ask your healthcare provider how much liquid to drink each day and which liquids are best for you.      •Keep a diary of your abdominal pain. A diary may help your healthcare provider learn what is causing your abdominal pain. Include when the pain happens, how long it lasts, and what the pain feels like. Write down any other symptoms you have with abdominal pain. Also write down what you eat, and what symptoms you have after you eat.      •Manage your stress. Stress may cause abdominal pain. Your healthcare provider may recommend relaxation techniques and deep breathing exercises to help decrease your stress. Your healthcare provider may recommend you talk to someone about your stress or anxiety, such as a counselor or a trusted friend. Get plenty of sleep and exercise regularly.  [employer] Walking for Exercise           •Limit or do not drink alcohol. Alcohol can make your abdominal pain worse. Ask your healthcare provider if it is safe for you to drink alcohol. Also ask how much is safe for you to drink.      •Do not smoke. Nicotine and other chemicals in cigarettes can damage your esophagus and stomach. Ask your healthcare provider for information if you currently smoke and need help to quit. E-cigarettes or smokeless tobacco still contain nicotine. Talk to your healthcare provider before you use these products.      Follow up with your doctor within 24 hours or as directed: Write down your questions so you remember to ask them during your visits.       © Copyright [employer] 2022           back to top                          © Copyright [employer] 2022

## 2022-02-13 NOTE — ED PROVIDER NOTE - CLINICAL SUMMARY MEDICAL DECISION MAKING FREE TEXT BOX
Patient with known malignant appendiceal cancer. Now with worsening pain and nausea. Will get labs, CTAP, reassess.

## 2022-02-13 NOTE — ED ADULT NURSE NOTE - OBJECTIVE STATEMENT
61M presents to ED for diffuse abdominal pain x 2 days. Pt states that he has cancer and currently receiving chemo. Pt endorses nausea, no vomiting and constipation - last BM yesterday. Pt denies blood in stool. Pt on Eliquis for RLL PE's. Pt denies SOB, swelling of legs.

## 2022-02-21 NOTE — ED POST DISCHARGE NOTE - OTHER COMMUNICATION
WORKER'S COMPENSATION FOLLOW UP VISIT NOTE    Date of Visit: 4/20/2021  Employer: [employer]  DATE OF INJURY: 3/28/2021    CC:   Chief Complaint   Patient presents with   • Worker's Compensation     RIGHT WRIST        HPI:   Juan Segovia is a 43 year old male, who presents for follow up of right wrist pain. He denies pain at rest and with range of motion.  Work has been going well, he is not currently wearing his brace.  Denies need for analgesia. Swelling is resolved.  Denies symptoms of tingling, numbness.     Denies any other aggravating or relieving factor and any other associated signs and symptoms.      He denies any other precipitating event or activity.  He has no history of previous problems except what is mentioned in history of present illness.    ROS:   A 10 point review of system was performed and findings relevant to this injury are included in the HPI.     SMOKING HISTORY: Smoking history is reviewed in the record.    ALLERGIES: Allergies are reviewed in the record.     CURRENT MEDICATIONS: Current Medications are reviewed.   Medications relevant to this injury include: See above    PROBLEM LIST: The Problem List is reviewed in the record and findings relevant to the injury are included in the HPI.    PAST HISTORY: Past medical history and past surgical history are reviewed in the record and findings relevant to the injury are included in the HPI.    PHYSICAL EXAM: Juan is a well developed male, who appears in no acute distress.    He is awake, alert, cooperative and pleasant. Vitals reviewed.  Vitals:    04/20/21 0827   BP: 130/90   Pulse: 88     PSYCHIATRIC:  Speech and behavior appropriate. Mood and affect are normal.  NEUROLOGICAL:  Alert and oriented x3, Speech is clear and fluent. Gait is non antalgic . Deep tendon reflexes are 2+ bilaterally in upper extremities.  CHEST: Without any deformities. Movement of the left side equals that of the right and is  normal.  SKIN:  Warm, dry, No scars, No rashes  CARDIOVASCULAR: Regular rate and rhythm  RESPIRATORY: Good respiratory effort bilaterally.  MUSCULOSKELETAL:   No erythema noted. No crepitus. No clubbing of fingernails. Two point sensory discriminatory touch and pin sensation is intact. Intact distal pulses.  No cyanosis. No clubbing. No calf tenderness.  Special testing:  right  wrist:  Warm, well-perfused.  Strong radial pulse, normal cap refill. The sensation was intact throughout to light touch. There was not subjective differences in the ulnar and median nerve sensation.   There is injury localized to the right  Wrist: No tenderness to palpation. No tenderness along the radial forearm or with thumb adduction.  Swelling resolved. Fingers show normal range of motion and strength. Normal flexion and extension.  Normal radial and ulnar deviation.   Anatomical snuffbox is nontender to palpation.  Finkelstein's testing was negative.  No tenderness to palpation at the distal or proximal carpal rows.    ASSESSMENT:   1. Right wrist pain    2. Thumb pain, right    3. Forearm swelling    4. Sprain of right wrist, subsequent encounter        PLAN:   This injury is determined to be work related.  I am discharging this patient from our care as of today. No further follow up is anticipated unless symptoms return or worsen. Juan Segovia may return to unrestricted duty as of today.  % permanent partial disability: 0%  Plateau of healing date: Today   Follow up: if symptoms return or worsen        RESTRICTIONS:     New Prescriptions    No medications on file       No orders of the defined types were placed in this encounter.       The patient's employer was notified.    See return to work report for further information.   I certify that a total of 15 minutes was used for this clinical visit of which greater than 70% of the time was involved in direct patient care involving the counseling and coordination of care regarding  the prevention and reaggravation of the current injury and the importance of following work restrictions at home and at work was discussed.    See Return to Work Report for further information. Worker compensation evaluations are reviewed within an acceptable time frame by Dr. Curtis Avina DO my collaborating physician.      2/23, result given to patient's oncologist Dr. Hernandez. He will have pt come to office for repeat UCx.

## 2022-05-18 ENCOUNTER — APPOINTMENT (OUTPATIENT)
Dept: SURGICAL ONCOLOGY | Facility: CLINIC | Age: 62
End: 2022-05-18

## 2022-05-18 VITALS
HEART RATE: 74 BPM | TEMPERATURE: 97.6 F | OXYGEN SATURATION: 99 % | HEIGHT: 66 IN | RESPIRATION RATE: 17 BRPM | DIASTOLIC BLOOD PRESSURE: 93 MMHG | SYSTOLIC BLOOD PRESSURE: 150 MMHG | BODY MASS INDEX: 21.05 KG/M2 | WEIGHT: 131 LBS

## 2022-05-18 PROCEDURE — 99213 OFFICE O/P EST LOW 20 MIN: CPT

## 2022-05-20 ENCOUNTER — APPOINTMENT (OUTPATIENT)
Dept: SURGICAL ONCOLOGY | Facility: CLINIC | Age: 62
End: 2022-05-20
Payer: MEDICARE

## 2022-05-20 VITALS
DIASTOLIC BLOOD PRESSURE: 91 MMHG | RESPIRATION RATE: 16 BRPM | SYSTOLIC BLOOD PRESSURE: 168 MMHG | BODY MASS INDEX: 21.05 KG/M2 | HEIGHT: 66 IN | HEART RATE: 83 BPM | OXYGEN SATURATION: 96 % | WEIGHT: 131 LBS

## 2022-05-20 PROCEDURE — 99212 OFFICE O/P EST SF 10 MIN: CPT

## 2022-05-20 NOTE — ASSESSMENT
[FreeTextEntry1] : Patient with right mid back wound. Today wound cleanse and packed. Patient is being setup with a visiting nurse for wound care dressing. \par \par PLAN:\par  RTO 3weeks with Dr. Qureshi

## 2022-05-20 NOTE — HISTORY OF PRESENT ILLNESS
[de-identified] : Luan Silva is a 61 year old male presents for a right mid back sebaceous cyst wound care . \par \par  Initially consulted 12/12/18, referred by Dr. Todd Escalona. \par The patient was seen by Dr. Escalona in November 2018 for mild discomfort to the RUQ for many years and significant GERD despite PPI treatment.  Patient is from [facility]. \par \par Abdominal ultrasound performed on 11/15/18 showed moderate amount of very complex ascites within the right and left abdomen causing mass effect and scalloping of the capsule of the liver. CT was then recommended. No evidence of cholecystitis. \par \par Upper GI series performed on 11/15/18 demonstrated a small hiatal hernia and severe GERD. \par \par CT A/P performed on 12/11/18 for Ascites revealed -FINDINGS: Limited sections through the lung bases demonstrate mild  bilateral atelectasis. There is a small bleb in the left lower lobe.  There is a dilated fluid-filled structure adjacent to and inseparable  from the cecum measuring up to 4.5 cm in diameter, suggestive of mucocele  of the appendix. Extensive complete free fluid is noted within the  abdomen and pelvis. There is scalloping at the liver capsule. 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 There is a tiny hypodense lesion in the liver dome, too small  to characterize. Nonspecific subcapsular a 9 mm hyperdense focus in  hepatic segment 4.  Gallstones in the gallbladder. The pancreas, spleen, adrenals, and kidney  appear unremarkable.  No bowel obstruction or grossly thickened bowel. Moderate amount of stool  in the rectum.  No evidence for free air, or enlarged lymph node.   The urinary bladder appears unremarkable. The prostate is enlarged.  CT IMPRESSION: Findings are suggestive of mucocele of the appendix with  associated malignant pseudomyxoma peritonei and associated hepatic  capsular metastatic implants.   9 mm nonspecific hyperdense focus in hepatic segment 4. If clinically  indicated, abdominal MR without and with IV contrast may be pursued for  further evaluation.\par \par PMH notable for HTN, HLD, total thyroidectomy for reportedly benign thyroid nodules 2013, DM2 (on Metformin), knee surgery 1996, gallstones since 1998. Social alcohol use.  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 \par \par Family history of malignancies involves his mother with ovarian cancer at age 72. His 1 sister underwent genetic testing and was found to be BRCA negative. He states that same sister also had a benign pancreatic tumor removed.  His other sister was diagnosed with breast cancer at age 38, s/p chemo and surgery and tested positive for BRCA gene. He denies any other family history of malignancies. \par \par He underwent a screening colonoscopy with Dr. Kate Irby on 1/3/19 where he was found to have a descending colon polyp, cecal polyp and a descending polyp with benign pathology. Cecal and terminal biopsy was also benign. \par \par Today he is with c/o mild/dull, 4/10, intermittent right upper quadrant abdominal pain since 2016. The pain is worse with heavy and fatty meals.  He also repots early satiety, 4 lb weight loss, occasional abdominal bloating. Denies changes in appetite or bowel habits. Denies nausea or vomiting. States he recently retired and his sleeping patterns have been thrown off. He states he now sleeps during the day and stays up a lot of the night. 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  \par \par Final pathology:\par 1) Falciform ligament excision: Metastatic low grade mucinous adenocarcinoma (M1)\par 2) Peritoneum, omentum, resection: Metastatic low grade mucinous adenocarcinoma (M1)\par 3) Spleen: Perisplenic soft tissue with metastatic low grade mucinous adenocarcinoma, splenic parenchyma is uninvolved. 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 \par TUMOR STAGE: [id number]\par -Postoperative course complicated by an ileus, resolved after NGT re-insertion. \par \par He consulted with Dr. Hernandez on 4/10/19. \par Labs 4/10/19: CEA (69); Hep A&B Negative; Hepatitis C reactive (1.78 H); TSH WNL; LFT's slightly elevated; Platelets (660 H)\par \par Xray Barium Enema performed on 4/11/19 with unremarkable findings, specifically no evidence of a leak. \par \par  s/p reversal of loop ileostomy on 4/25/19 with benign pathology. \par \par CT A/P 8/30/19- IMPRESSION:  Stable soft tissue implants throughout the abdomen and pelvis.  Status post reversal of right lower quadrant colostomy. Status post right hemicolectomy.  Cholelithiasis.\par \par BW 10/16/19:  (205) ; CEA (78.7); Hgb and Hct WNL\par \par s/p left ACW mediport placement 1/23/2020.  \par \par Today 5/18/22: Patient states has notice lipoma in mid back, which has been there for many years however has gone more painful and started oozing. Patient states abdominal pain controlled with pain medication. patient continue chemotherapy with Dr. Hernandez and is tolerating \par \par Today 5/19/22: Patient present for wound care.

## 2022-05-20 NOTE — PHYSICAL EXAM
[FreeTextEntry1] : right mid back sebaceous cyst. Wound cleansed with saline and repacked with iodofoam packing, guaze and tape. Patient tolerated well

## 2022-05-23 ENCOUNTER — APPOINTMENT (OUTPATIENT)
Dept: SURGICAL ONCOLOGY | Facility: CLINIC | Age: 62
End: 2022-05-23
Payer: MEDICARE

## 2022-05-23 VITALS
DIASTOLIC BLOOD PRESSURE: 93 MMHG | WEIGHT: 135 LBS | HEIGHT: 66 IN | OXYGEN SATURATION: 95 % | TEMPERATURE: 97.9 F | RESPIRATION RATE: 16 BRPM | BODY MASS INDEX: 21.69 KG/M2 | SYSTOLIC BLOOD PRESSURE: 160 MMHG | HEART RATE: 76 BPM

## 2022-05-23 PROCEDURE — 99212 OFFICE O/P EST SF 10 MIN: CPT

## 2022-05-23 NOTE — PHYSICAL EXAM
[FreeTextEntry1] : right mid back sebaceous cyst. Wound cleansed with saline and repacked with iodofoam packing, gauze and tape. Patient tolerated well

## 2022-05-23 NOTE — HISTORY OF PRESENT ILLNESS
[de-identified] : Luan Silva is a 61 year old male presents for a right mid back sebaceous cyst wound care . \par \par  Initially consulted 12/12/18, referred by Dr. Todd Escalona. \par The patient was seen by Dr. Escalona in November 2018 for mild discomfort to the RUQ for many years and significant GERD despite PPI treatment.  Patient is from [facility]. \par \par Abdominal ultrasound performed on 11/15/18 showed moderate amount of very complex ascites within the right and left abdomen causing mass effect and scalloping of the capsule of the liver. CT was then recommended. No evidence of cholecystitis. \par \par Upper GI series performed on 11/15/18 demonstrated a small hiatal hernia and severe GERD. \par \par CT A/P performed on 12/11/18 for Ascites revealed -FINDINGS: Limited sections through the lung bases demonstrate mild  bilateral atelectasis. There is a small bleb in the left lower lobe.  There is a dilated fluid-filled structure adjacent to and inseparable  from the cecum measuring up to 4.5 cm in diameter, suggestive of mucocele  of the appendix. Extensive complete free fluid is noted within the  abdomen and pelvis. There is scalloping at the liver capsule. 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 There is a tiny hypodense lesion in the liver dome, too small  to characterize. Nonspecific subcapsular a 9 mm hyperdense focus in  hepatic segment 4.  Gallstones in the gallbladder. The pancreas, spleen, adrenals, and kidney  appear unremarkable.  No bowel obstruction or grossly thickened bowel. Moderate amount of stool  in the rectum.  No evidence for free air, or enlarged lymph node.   The urinary bladder appears unremarkable. The prostate is enlarged.  CT IMPRESSION: Findings are suggestive of mucocele of the appendix with  associated malignant pseudomyxoma peritonei and associated hepatic  capsular metastatic implants.   9 mm nonspecific hyperdense focus in hepatic segment 4. If clinically  indicated, abdominal MR without and with IV contrast may be pursued for  further evaluation.\par \par PMH notable for HTN, HLD, total thyroidectomy for reportedly benign thyroid nodules 2013, DM2 (on Metformin), knee surgery 1996, gallstones since 1998. Social alcohol use.  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 \par \par Family history of malignancies involves his mother with ovarian cancer at age 72. His 1 sister underwent genetic testing and was found to be BRCA negative. He states that same sister also had a benign pancreatic tumor removed.  His other sister was diagnosed with breast cancer at age 38, s/p chemo and surgery and tested positive for BRCA gene. He denies any other family history of malignancies. \par \par He underwent a screening colonoscopy with Dr. Kate Irby on 1/3/19 where he was found to have a descending colon polyp, cecal polyp and a descending polyp with benign pathology. Cecal and terminal biopsy was also benign. \par \par Today he is with c/o mild/dull, 4/10, intermittent right upper quadrant abdominal pain since 2016. The pain is worse with heavy and fatty meals.  He also repots early satiety, 4 lb weight loss, occasional abdominal bloating. Denies changes in appetite or bowel habits. Denies nausea or vomiting. States he recently retired and his sleeping patterns have been thrown off. He states he now sleeps during the day and stays up a lot of the night. 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  \par \par Final pathology:\par 1) Falciform ligament excision: Metastatic low grade mucinous adenocarcinoma (M1)\par 2) Peritoneum, omentum, resection: Metastatic low grade mucinous adenocarcinoma (M1)\par 3) Spleen: Perisplenic soft tissue with metastatic low grade mucinous adenocarcinoma, splenic parenchyma is uninvolved. 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 \par TUMOR STAGE: [id number]\par -Postoperative course complicated by an ileus, resolved after NGT re-insertion. \par \par He consulted with Dr. Hernandez on 4/10/19. \par Labs 4/10/19: CEA (69); Hep A&B Negative; Hepatitis C reactive (1.78 H); TSH WNL; LFT's slightly elevated; Platelets (660 H)\par \par Xray Barium Enema performed on 4/11/19 with unremarkable findings, specifically no evidence of a leak. \par \par  s/p reversal of loop ileostomy on 4/25/19 with benign pathology. \par \par CT A/P 8/30/19- IMPRESSION:  Stable soft tissue implants throughout the abdomen and pelvis.  Status post reversal of right lower quadrant colostomy. Status post right hemicolectomy.  Cholelithiasis.\par \par BW 10/16/19:  (205) ; CEA (78.7); Hgb and Hct WNL\par \par s/p left ACW mediport placement 1/23/2020.  \par \par Today 5/18/22: Patient states has notice lipoma in mid back, which has been there for many years however has gone more painful and started oozing. Patient states abdominal pain controlled with pain medication. patient continue chemotherapy with Dr. Hernandez and is tolerating \par \par Today 5/19/22: Patient present for wound care. \par \par Today 5/23/22: Patient present today wound care. States feeling well \par

## 2022-05-29 ENCOUNTER — NON-APPOINTMENT (OUTPATIENT)
Age: 62
End: 2022-05-29

## 2022-06-01 ENCOUNTER — TRANSCRIPTION ENCOUNTER (OUTPATIENT)
Age: 62
End: 2022-06-01

## 2022-06-01 ENCOUNTER — OUTPATIENT (OUTPATIENT)
Dept: OUTPATIENT SERVICES | Facility: HOSPITAL | Age: 62
LOS: 1 days | End: 2022-06-01
Payer: MEDICARE

## 2022-06-01 VITALS
RESPIRATION RATE: 17 BRPM | SYSTOLIC BLOOD PRESSURE: 176 MMHG | WEIGHT: 132.06 LBS | OXYGEN SATURATION: 98 % | DIASTOLIC BLOOD PRESSURE: 104 MMHG | TEMPERATURE: 98 F | HEART RATE: 78 BPM | HEIGHT: 66 IN

## 2022-06-01 DIAGNOSIS — Z98.890 OTHER SPECIFIED POSTPROCEDURAL STATES: Chronic | ICD-10-CM

## 2022-06-01 DIAGNOSIS — E11.9 TYPE 2 DIABETES MELLITUS WITHOUT COMPLICATIONS: ICD-10-CM

## 2022-06-01 DIAGNOSIS — L72.3 SEBACEOUS CYST: ICD-10-CM

## 2022-06-01 DIAGNOSIS — R03.0 ELEVATED BLOOD-PRESSURE READING, WITHOUT DIAGNOSIS OF HYPERTENSION: ICD-10-CM

## 2022-06-01 DIAGNOSIS — I26.99 OTHER PULMONARY EMBOLISM WITHOUT ACUTE COR PULMONALE: ICD-10-CM

## 2022-06-01 LAB
ALBUMIN SERPL ELPH-MCNC: 3.9 G/DL — SIGNIFICANT CHANGE UP (ref 3.3–5)
ALP SERPL-CCNC: 133 U/L — HIGH (ref 40–120)
ALT FLD-CCNC: 10 U/L — SIGNIFICANT CHANGE UP (ref 4–41)
ANION GAP SERPL CALC-SCNC: 14 MMOL/L — SIGNIFICANT CHANGE UP (ref 7–14)
AST SERPL-CCNC: 18 U/L — SIGNIFICANT CHANGE UP (ref 4–40)
BILIRUB SERPL-MCNC: 0.4 MG/DL — SIGNIFICANT CHANGE UP (ref 0.2–1.2)
BUN SERPL-MCNC: 11 MG/DL — SIGNIFICANT CHANGE UP (ref 7–23)
CALCIUM SERPL-MCNC: 9.7 MG/DL — SIGNIFICANT CHANGE UP (ref 8.4–10.5)
CHLORIDE SERPL-SCNC: 100 MMOL/L — SIGNIFICANT CHANGE UP (ref 98–107)
CO2 SERPL-SCNC: 26 MMOL/L — SIGNIFICANT CHANGE UP (ref 22–31)
CREAT SERPL-MCNC: 0.6 MG/DL — SIGNIFICANT CHANGE UP (ref 0.5–1.3)
EGFR: 108 ML/MIN/1.73M2 — SIGNIFICANT CHANGE UP
GLUCOSE SERPL-MCNC: 77 MG/DL — SIGNIFICANT CHANGE UP (ref 70–99)
HCT VFR BLD CALC: 36.5 % — LOW (ref 39–50)
HGB BLD-MCNC: 11.6 G/DL — LOW (ref 13–17)
MCHC RBC-ENTMCNC: 28 PG — SIGNIFICANT CHANGE UP (ref 27–34)
MCHC RBC-ENTMCNC: 31.8 GM/DL — LOW (ref 32–36)
MCV RBC AUTO: 88 FL — SIGNIFICANT CHANGE UP (ref 80–100)
NRBC # BLD: 0 /100 WBCS — SIGNIFICANT CHANGE UP
NRBC # FLD: 0.04 K/UL — HIGH
PLATELET # BLD AUTO: 458 K/UL — HIGH (ref 150–400)
POTASSIUM SERPL-MCNC: 3.9 MMOL/L — SIGNIFICANT CHANGE UP (ref 3.5–5.3)
POTASSIUM SERPL-SCNC: 3.9 MMOL/L — SIGNIFICANT CHANGE UP (ref 3.5–5.3)
PROT SERPL-MCNC: 9.1 G/DL — HIGH (ref 6–8.3)
RBC # BLD: 4.15 M/UL — LOW (ref 4.2–5.8)
RBC # FLD: 25.7 % — HIGH (ref 10.3–14.5)
SODIUM SERPL-SCNC: 140 MMOL/L — SIGNIFICANT CHANGE UP (ref 135–145)
WBC # BLD: 5.71 K/UL — SIGNIFICANT CHANGE UP (ref 3.8–10.5)
WBC # FLD AUTO: 5.71 K/UL — SIGNIFICANT CHANGE UP (ref 3.8–10.5)

## 2022-06-01 PROCEDURE — 93010 ELECTROCARDIOGRAM REPORT: CPT

## 2022-06-01 RX ORDER — METFORMIN HYDROCHLORIDE 850 MG/1
1 TABLET ORAL
Qty: 0 | Refills: 0 | DISCHARGE

## 2022-06-01 NOTE — ASU PATIENT PROFILE, ADULT - FALL HARM RISK - UNIVERSAL INTERVENTIONS
Bed in lowest position, wheels locked, appropriate side rails in place/Call bell, personal items and telephone in reach/Instruct patient to call for assistance before getting out of bed or chair/Non-slip footwear when patient is out of bed/[place] to call system/Physically safe environment - no spills, clutter or unnecessary equipment/Purposeful Proactive Rounding/Room/bathroom lighting operational, light cord in reach

## 2022-06-01 NOTE — ASU PATIENT PROFILE, ADULT - NSICDXPASTMEDICALHX_GEN_ALL_CORE_FT
PAST MEDICAL HISTORY:  Diabetes mellitus type 2    Gallstones     Gastritis     Hyperlipidemia     Hypertension pt states  losartan dc d 2 months ago    Malignant neoplasm of appendix     Pulmonary embolism     Sebaceous cyst back    Thyroid nodule benign s/p Thyroidectomy    Tuberculosis exposure 1996 - treated with medicaiton 10 months

## 2022-06-01 NOTE — H&P PST ADULT - NSICDXPASTMEDICALHX_GEN_ALL_CORE_FT
PAST MEDICAL HISTORY:  Diabetes mellitus type 2    Gallstones     Gastritis     Hyperlipidemia     Hypertension pt states  losartan dc d 2 months ago    Malignant neoplasm of appendix     Sebaceous cyst back    Thyroid nodule benign s/p Thyroidectomy    Tuberculosis exposure 1996 - treated with medicaiton 10 months     PAST MEDICAL HISTORY:  Diabetes mellitus type 2    Gallstones     Gastritis     Hyperlipidemia     Hypertension pt states  losartan dc d 2 months ago    Malignant neoplasm of appendix     Pulmonary embolism     Sebaceous cyst back    Thyroid nodule benign s/p Thyroidectomy    Tuberculosis exposure 1996 - treated with medicaiton 10 months

## 2022-06-01 NOTE — H&P PST ADULT - PROBLEM SELECTOR PLAN 4
Repeat /100 and manual 30 mins. later 160/98.  Pt. is asymptomatic.  Pt. does not have a PMD.  Spoke with the Nati at the Oncologist office.  She stated that his BP was not as high at his last visit and that it needs to be addressed with his PMD.  The pt. states his anti hypertensive medication is filled by the Oncologist.  Instructed her that the pt. needs an additional anti hypertensive medication in hopes of it being controlled long term and also after taking a dose today and in the morning that it will help lower BP enough to proceed with surgery.  Arely stated she will send a script to his [facility] in [place].  Discussed with Dr. Thomas.

## 2022-06-01 NOTE — ASU PATIENT PROFILE, ADULT - NS TRANSFER DENTURES
[Seizure frequency] : Seizure frequency: Yes [Etiology, seizure type, and epilepsy syndrome] : Etiology, seizure type, and epilepsy syndrome: Yes [Side effects of anti-seizure medications] : Side effects of anti-seizure medications: Yes [Safety and education around seizures] : Safety and education around seizures: Yes [Issues around driving] : Issues around driving: Not Applicable [Screening for anxiety, depression] : Screening for anxiety, depression: Not Applicable [Treatment-resistant epilepsy (every visit)] : Treatment-resistant epilepsy (every visit): Not Applicable [Adherence to medication(s)] : Adherence to medication(s): Yes [Counseling for women of childbearing potential with epilepsy (including folic acid supplement)] : Counseling for women of childbearing potential with epilepsy (including folic acid supplement): Not Applicable [Options for adjunctive therapy (Neurostimulation, CBD, Dietary Therapy, Epilepsy Surgery)] : Options for adjunctive therapy (Neurostimulation, CBD, Dietary Therapy, Epilepsy Surgery): Not Applicable [25 Hydroxy Vitamin D level assessed and Vitamin D3 ordered] : 25 Hydroxy Vitamin D level assessed and Vitamin D3 ordered: Not Applicable none

## 2022-06-01 NOTE — H&P PST ADULT - PROBLEM SELECTOR PLAN 1
Pt is scheduled for resection of back cyst, wound VAC placement 6/2/22.  Pt. verbalized understanding of instructions

## 2022-06-02 ENCOUNTER — INPATIENT (INPATIENT)
Facility: HOSPITAL | Age: 62
LOS: 4 days | Discharge: HOME | End: 2022-06-07
Attending: SURGERY | Admitting: SURGERY
Payer: MEDICARE

## 2022-06-02 ENCOUNTER — TRANSCRIPTION ENCOUNTER (OUTPATIENT)
Age: 62
End: 2022-06-02

## 2022-06-02 ENCOUNTER — APPOINTMENT (OUTPATIENT)
Dept: SURGICAL ONCOLOGY | Facility: HOSPITAL | Age: 62
End: 2022-06-02

## 2022-06-02 ENCOUNTER — RESULT REVIEW (OUTPATIENT)
Age: 62
End: 2022-06-02

## 2022-06-02 VITALS
HEIGHT: 66 IN | HEART RATE: 81 BPM | DIASTOLIC BLOOD PRESSURE: 86 MMHG | WEIGHT: 134.92 LBS | RESPIRATION RATE: 17 BRPM | TEMPERATURE: 98 F | SYSTOLIC BLOOD PRESSURE: 164 MMHG | OXYGEN SATURATION: 96 %

## 2022-06-02 DIAGNOSIS — Z98.890 OTHER SPECIFIED POSTPROCEDURAL STATES: Chronic | ICD-10-CM

## 2022-06-02 DIAGNOSIS — L72.3 SEBACEOUS CYST: ICD-10-CM

## 2022-06-02 LAB
A1C WITH ESTIMATED AVERAGE GLUCOSE RESULT: 5.4 % — SIGNIFICANT CHANGE UP (ref 4–5.6)
ESTIMATED AVERAGE GLUCOSE: 108 MG/DL — SIGNIFICANT CHANGE UP (ref 68–114)
GLUCOSE BLDC GLUCOMTR-MCNC: 123 MG/DL — HIGH (ref 70–99)
GLUCOSE BLDC GLUCOMTR-MCNC: 154 MG/DL — HIGH (ref 70–99)
GLUCOSE BLDC GLUCOMTR-MCNC: 163 MG/DL — HIGH (ref 70–99)
GLUCOSE BLDC GLUCOMTR-MCNC: 59 MG/DL — LOW (ref 70–99)
GLUCOSE BLDC GLUCOMTR-MCNC: 63 MG/DL — LOW (ref 70–99)
GLUCOSE BLDC GLUCOMTR-MCNC: 73 MG/DL — SIGNIFICANT CHANGE UP (ref 70–99)
GLUCOSE BLDC GLUCOMTR-MCNC: 83 MG/DL — SIGNIFICANT CHANGE UP (ref 70–99)

## 2022-06-02 PROCEDURE — 10060 I&D ABSCESS SIMPLE/SINGLE: CPT | Mod: 59

## 2022-06-02 PROCEDURE — 97607 NEG PRS WND THR NDME<=50SQCM: CPT | Mod: 59

## 2022-06-02 PROCEDURE — 88304 TISSUE EXAM BY PATHOLOGIST: CPT | Mod: 26

## 2022-06-02 PROCEDURE — 21932 EXC BACK TUM DEEP < 5 CM: CPT

## 2022-06-02 RX ORDER — SODIUM CHLORIDE 9 MG/ML
1000 INJECTION, SOLUTION INTRAVENOUS
Refills: 0 | Status: DISCONTINUED | OUTPATIENT
Start: 2022-06-02 | End: 2022-06-06

## 2022-06-02 RX ORDER — TRIFLURIDINE AND TIPIRACIL 20; 8.19 MG/1; MG/1
0 TABLET, FILM COATED ORAL
Qty: 0 | Refills: 0 | DISCHARGE
End: 2022-05-18

## 2022-06-02 RX ORDER — LOSARTAN POTASSIUM 100 MG/1
1 TABLET, FILM COATED ORAL
Qty: 0 | Refills: 0 | DISCHARGE

## 2022-06-02 RX ORDER — METFORMIN HYDROCHLORIDE 850 MG/1
1 TABLET ORAL
Qty: 0 | Refills: 0 | DISCHARGE

## 2022-06-02 RX ORDER — VANCOMYCIN HCL 1 G
1000 VIAL (EA) INTRAVENOUS ONCE
Refills: 0 | Status: DISCONTINUED | OUTPATIENT
Start: 2022-06-02 | End: 2022-06-02

## 2022-06-02 RX ORDER — DEXTROSE 50 % IN WATER 50 %
25 SYRINGE (ML) INTRAVENOUS ONCE
Refills: 0 | Status: DISCONTINUED | OUTPATIENT
Start: 2022-06-02 | End: 2022-06-07

## 2022-06-02 RX ORDER — SODIUM CHLORIDE 9 MG/ML
1000 INJECTION, SOLUTION INTRAVENOUS
Refills: 0 | Status: DISCONTINUED | OUTPATIENT
Start: 2022-06-02 | End: 2022-06-02

## 2022-06-02 RX ORDER — OXYCODONE HYDROCHLORIDE 5 MG/1
10 TABLET ORAL EVERY 4 HOURS
Refills: 0 | Status: DISCONTINUED | OUTPATIENT
Start: 2022-06-02 | End: 2022-06-07

## 2022-06-02 RX ORDER — DEXTROSE 50 % IN WATER 50 %
15 SYRINGE (ML) INTRAVENOUS ONCE
Refills: 0 | Status: DISCONTINUED | OUTPATIENT
Start: 2022-06-02 | End: 2022-06-07

## 2022-06-02 RX ORDER — LEVOTHYROXINE SODIUM 125 MCG
1 TABLET ORAL
Qty: 0 | Refills: 0 | DISCHARGE

## 2022-06-02 RX ORDER — ATORVASTATIN CALCIUM 80 MG/1
20 TABLET, FILM COATED ORAL AT BEDTIME
Refills: 0 | Status: DISCONTINUED | OUTPATIENT
Start: 2022-06-02 | End: 2022-06-07

## 2022-06-02 RX ORDER — HEPARIN SODIUM 5000 [USP'U]/ML
5000 INJECTION INTRAVENOUS; SUBCUTANEOUS EVERY 8 HOURS
Refills: 0 | Status: DISCONTINUED | OUTPATIENT
Start: 2022-06-02 | End: 2022-06-03

## 2022-06-02 RX ORDER — INSULIN LISPRO 100/ML
VIAL (ML) SUBCUTANEOUS AT BEDTIME
Refills: 0 | Status: DISCONTINUED | OUTPATIENT
Start: 2022-06-02 | End: 2022-06-07

## 2022-06-02 RX ORDER — DEXTROSE 50 % IN WATER 50 %
12.5 SYRINGE (ML) INTRAVENOUS ONCE
Refills: 0 | Status: DISCONTINUED | OUTPATIENT
Start: 2022-06-02 | End: 2022-06-07

## 2022-06-02 RX ORDER — GLUCAGON INJECTION, SOLUTION 0.5 MG/.1ML
1 INJECTION, SOLUTION SUBCUTANEOUS ONCE
Refills: 0 | Status: DISCONTINUED | OUTPATIENT
Start: 2022-06-02 | End: 2022-06-07

## 2022-06-02 RX ORDER — INSULIN LISPRO 100/ML
VIAL (ML) SUBCUTANEOUS
Refills: 0 | Status: DISCONTINUED | OUTPATIENT
Start: 2022-06-02 | End: 2022-06-07

## 2022-06-02 RX ORDER — OXYCODONE HYDROCHLORIDE 5 MG/1
5 TABLET ORAL EVERY 4 HOURS
Refills: 0 | Status: DISCONTINUED | OUTPATIENT
Start: 2022-06-02 | End: 2022-06-07

## 2022-06-02 RX ORDER — LOSARTAN POTASSIUM 100 MG/1
100 TABLET, FILM COATED ORAL DAILY
Refills: 0 | Status: DISCONTINUED | OUTPATIENT
Start: 2022-06-02 | End: 2022-06-07

## 2022-06-02 RX ORDER — APIXABAN 2.5 MG/1
1 TABLET, FILM COATED ORAL
Qty: 0 | Refills: 0 | DISCHARGE

## 2022-06-02 RX ORDER — DEXTROSE 50 % IN WATER 50 %
50 SYRINGE (ML) INTRAVENOUS ONCE
Refills: 0 | Status: DISCONTINUED | OUTPATIENT
Start: 2022-06-02 | End: 2022-06-03

## 2022-06-02 RX ORDER — ACETAMINOPHEN 500 MG
975 TABLET ORAL EVERY 6 HOURS
Refills: 0 | Status: DISCONTINUED | OUTPATIENT
Start: 2022-06-02 | End: 2022-06-07

## 2022-06-02 RX ORDER — LEVOTHYROXINE SODIUM 125 MCG
112 TABLET ORAL DAILY
Refills: 0 | Status: DISCONTINUED | OUTPATIENT
Start: 2022-06-02 | End: 2022-06-07

## 2022-06-02 RX ORDER — ATORVASTATIN CALCIUM 80 MG/1
1 TABLET, FILM COATED ORAL
Qty: 0 | Refills: 0 | DISCHARGE

## 2022-06-02 RX ORDER — ONDANSETRON 8 MG/1
4 TABLET, FILM COATED ORAL EVERY 8 HOURS
Refills: 0 | Status: DISCONTINUED | OUTPATIENT
Start: 2022-06-02 | End: 2022-06-07

## 2022-06-02 RX ADMIN — HEPARIN SODIUM 5000 UNIT(S): 5000 INJECTION INTRAVENOUS; SUBCUTANEOUS at 23:57

## 2022-06-02 RX ADMIN — OXYCODONE HYDROCHLORIDE 5 MILLIGRAM(S): 5 TABLET ORAL at 20:35

## 2022-06-02 RX ADMIN — ATORVASTATIN CALCIUM 20 MILLIGRAM(S): 80 TABLET, FILM COATED ORAL at 23:56

## 2022-06-02 RX ADMIN — Medication 975 MILLIGRAM(S): at 23:57

## 2022-06-02 RX ADMIN — OXYCODONE HYDROCHLORIDE 5 MILLIGRAM(S): 5 TABLET ORAL at 21:00

## 2022-06-02 NOTE — PATIENT PROFILE ADULT - FALL HARM RISK - UNIVERSAL INTERVENTIONS
Bed in lowest position, wheels locked, appropriate side rails in place/Call bell, personal items and telephone in reach/Instruct patient to call for assistance before getting out of bed or chair/Non-slip footwear when patient is out of bed/[place] to call system/Physically safe environment - no spills, clutter or unnecessary equipment/Purposeful Proactive Rounding/Room/bathroom lighting operational, light cord in reach

## 2022-06-02 NOTE — CHART NOTE - NSCHARTNOTEFT_GEN_A_CORE
Post Operative Check    Patient is post op from a  and is status post back cyst excision with wound vac placement. He relays minimal pain.     Vitals    T(C): 36.2 (06-02-22 @ 18:50), Max: 36.6 (06-02-22 @ 14:11)  HR: 76 (06-02-22 @ 21:30) (71 - 90)  BP: 150/85 (06-02-22 @ 21:30) (134/97 - 164/86)  RR: 15 (06-02-22 @ 21:30) (10 - 17)  SpO2: 96% (06-02-22 @ 21:30) (94% - 97%)      06-02 @ 07:01  -  06-02 @ 21:51  --------------------------------------------------------  IN:    dextrose 5% + lactated ringers: 75 mL  Total IN: 75 mL    OUT:    Oral Fluid: 0 mL    Voided (mL): 550 mL  Total OUT: 550 mL    Total NET: -475 mL          Labs                        11.6   5.71  )-----------( 458      ( 01 Jun 2022 11:40 )             36.5       CBC Full  -  ( 01 Jun 2022 11:40 )  WBC Count : 5.71 K/uL  Hemoglobin : 11.6 g/dL  Hematocrit : 36.5 %  Platelet Count - Automated : 458 K/uL  Mean Cell Volume : 88.0 fL  Mean Cell Hemoglobin : 28.0 pg  Mean Cell Hemoglobin Concentration : 31.8 gm/dL  Auto Neutrophil # : x  Auto Lymphocyte # : x  Auto Monocyte # : x  Auto Eosinophil # : x  Auto Basophil # : x  Auto Neutrophil % : x  Auto Lymphocyte % : x  Auto Monocyte % : x  Auto Eosinophil % : x  Auto Basophil % : x      Physical Exam  General: comfortable sitting upright in bed.   back: wound vac holding suction, no palpable collections, no drainage      Patient is a 61y old Male s/p back excision with wound vac placement    Plan:  - dispo planning  - maintain wound vac  - resume diet

## 2022-06-03 ENCOUNTER — TRANSCRIPTION ENCOUNTER (OUTPATIENT)
Age: 62
End: 2022-06-03

## 2022-06-03 LAB
ANION GAP SERPL CALC-SCNC: 12 MMOL/L — SIGNIFICANT CHANGE UP (ref 7–14)
BUN SERPL-MCNC: 10 MG/DL — SIGNIFICANT CHANGE UP (ref 7–23)
CALCIUM SERPL-MCNC: 9.1 MG/DL — SIGNIFICANT CHANGE UP (ref 8.4–10.5)
CHLORIDE SERPL-SCNC: 101 MMOL/L — SIGNIFICANT CHANGE UP (ref 98–107)
CO2 SERPL-SCNC: 23 MMOL/L — SIGNIFICANT CHANGE UP (ref 22–31)
CREAT SERPL-MCNC: 0.48 MG/DL — LOW (ref 0.5–1.3)
EGFR: 116 ML/MIN/1.73M2 — SIGNIFICANT CHANGE UP
GLUCOSE BLDC GLUCOMTR-MCNC: 110 MG/DL — HIGH (ref 70–99)
GLUCOSE BLDC GLUCOMTR-MCNC: 115 MG/DL — HIGH (ref 70–99)
GLUCOSE BLDC GLUCOMTR-MCNC: 129 MG/DL — HIGH (ref 70–99)
GLUCOSE BLDC GLUCOMTR-MCNC: 132 MG/DL — HIGH (ref 70–99)
GLUCOSE BLDC GLUCOMTR-MCNC: 74 MG/DL — SIGNIFICANT CHANGE UP (ref 70–99)
GLUCOSE SERPL-MCNC: 116 MG/DL — HIGH (ref 70–99)
HCT VFR BLD CALC: 32.1 % — LOW (ref 39–50)
HGB BLD-MCNC: 10.5 G/DL — LOW (ref 13–17)
MAGNESIUM SERPL-MCNC: 1.6 MG/DL — SIGNIFICANT CHANGE UP (ref 1.6–2.6)
MCHC RBC-ENTMCNC: 28.6 PG — SIGNIFICANT CHANGE UP (ref 27–34)
MCHC RBC-ENTMCNC: 32.7 GM/DL — SIGNIFICANT CHANGE UP (ref 32–36)
MCV RBC AUTO: 87.5 FL — SIGNIFICANT CHANGE UP (ref 80–100)
NRBC # BLD: 0 /100 WBCS — SIGNIFICANT CHANGE UP
NRBC # FLD: 0.02 K/UL — HIGH
PHOSPHATE SERPL-MCNC: 2.4 MG/DL — LOW (ref 2.5–4.5)
PLATELET # BLD AUTO: 415 K/UL — HIGH (ref 150–400)
POTASSIUM SERPL-MCNC: 3.9 MMOL/L — SIGNIFICANT CHANGE UP (ref 3.5–5.3)
POTASSIUM SERPL-SCNC: 3.9 MMOL/L — SIGNIFICANT CHANGE UP (ref 3.5–5.3)
RBC # BLD: 3.67 M/UL — LOW (ref 4.2–5.8)
RBC # FLD: 24.5 % — HIGH (ref 10.3–14.5)
SODIUM SERPL-SCNC: 136 MMOL/L — SIGNIFICANT CHANGE UP (ref 135–145)
WBC # BLD: 3.45 K/UL — LOW (ref 3.8–10.5)
WBC # FLD AUTO: 3.45 K/UL — LOW (ref 3.8–10.5)

## 2022-06-03 RX ORDER — PANTOPRAZOLE SODIUM 20 MG/1
40 TABLET, DELAYED RELEASE ORAL
Refills: 0 | Status: DISCONTINUED | OUTPATIENT
Start: 2022-06-03 | End: 2022-06-07

## 2022-06-03 RX ORDER — ENOXAPARIN SODIUM 100 MG/ML
40 INJECTION SUBCUTANEOUS EVERY 24 HOURS
Refills: 0 | Status: DISCONTINUED | OUTPATIENT
Start: 2022-06-03 | End: 2022-06-06

## 2022-06-03 RX ORDER — SODIUM,POTASSIUM PHOSPHATES 278-250MG
1 POWDER IN PACKET (EA) ORAL
Refills: 0 | Status: COMPLETED | OUTPATIENT
Start: 2022-06-03 | End: 2022-06-04

## 2022-06-03 RX ORDER — MAGNESIUM SULFATE 500 MG/ML
1 VIAL (ML) INJECTION ONCE
Refills: 0 | Status: COMPLETED | OUTPATIENT
Start: 2022-06-03 | End: 2022-06-03

## 2022-06-03 RX ADMIN — Medication 112 MICROGRAM(S): at 06:32

## 2022-06-03 RX ADMIN — Medication 975 MILLIGRAM(S): at 17:12

## 2022-06-03 RX ADMIN — HEPARIN SODIUM 5000 UNIT(S): 5000 INJECTION INTRAVENOUS; SUBCUTANEOUS at 06:33

## 2022-06-03 RX ADMIN — Medication 975 MILLIGRAM(S): at 13:00

## 2022-06-03 RX ADMIN — Medication 975 MILLIGRAM(S): at 17:39

## 2022-06-03 RX ADMIN — LOSARTAN POTASSIUM 100 MILLIGRAM(S): 100 TABLET, FILM COATED ORAL at 06:32

## 2022-06-03 RX ADMIN — ATORVASTATIN CALCIUM 20 MILLIGRAM(S): 80 TABLET, FILM COATED ORAL at 22:33

## 2022-06-03 RX ADMIN — Medication 1 TABLET(S): at 17:11

## 2022-06-03 RX ADMIN — Medication 975 MILLIGRAM(S): at 12:20

## 2022-06-03 RX ADMIN — Medication 975 MILLIGRAM(S): at 00:27

## 2022-06-03 RX ADMIN — Medication 100 GRAM(S): at 13:19

## 2022-06-03 RX ADMIN — Medication 975 MILLIGRAM(S): at 07:03

## 2022-06-03 RX ADMIN — PANTOPRAZOLE SODIUM 40 MILLIGRAM(S): 20 TABLET, DELAYED RELEASE ORAL at 12:20

## 2022-06-03 RX ADMIN — Medication 975 MILLIGRAM(S): at 06:33

## 2022-06-03 RX ADMIN — HEPARIN SODIUM 5000 UNIT(S): 5000 INJECTION INTRAVENOUS; SUBCUTANEOUS at 13:16

## 2022-06-03 NOTE — DISCHARGE NOTE NURSING/CASE MANAGEMENT/SOCIAL WORK - NSSCNAMETXT_GEN_ALL_CORE
[facility] at Home [facility] at Home initial visit will be day after discharge home. A nurse will call prior to the home visit.

## 2022-06-03 NOTE — DISCHARGE NOTE PROVIDER - NSDCCPCAREPLAN_GEN_ALL_CORE_FT
PRINCIPAL DISCHARGE DIAGNOSIS  Diagnosis: Sebaceous cyst  Assessment and Plan of Treatment: WOUND CARE:  Discharge with wet to dry with NS to surgical wound,  wound vac change q48h to surgical wound by visiting nurse as instructed via vac paperwork.  BATHING: Please do not submerge wound underwater. You may shower and/or sponge bathe.  ACTIVITY: No heavy lifting or straining. Otherwise, you may return to your usual level of physical activity. If you are taking narcotic pain medication (such as Percocet) DO NOT drive a car, operate machinery or make important decisions.  DIET: Return to your usual diet.  NOTIFY YOUR SURGEON IF: You have any bleeding that does not stop, any pus draining from your wound(s), any fever (over 100.4 F) or chills, persistent nausea/vomiting, persistent diarrhea, or if your pain is not controlled on your discharge pain medications.  FOLLOW-UP: Please follow up with your primary care physician in one week regarding your hospitalization      
I will STOP taking the medications listed below when I get home from the hospital:  None

## 2022-06-03 NOTE — PROGRESS NOTE ADULT - SUBJECTIVE AND OBJECTIVE BOX
Subjective:   Patient seen at bedside this AM. Reports feeling well, without complaints. Denies chest pain, SOB. Tolerating diet without N/V.     24h Events:   - Overnight, no acute events    Objective:  Vital Signs  T(C): 36.6 (06-02 @ 22:02), Max: 36.6 (06-02 @ 14:11)  HR: 78 (06-02 @ 22:02) (71 - 90)  BP: 162/84 (06-02 @ 22:02) (134/97 - 164/86)  RR: 16 (06-02 @ 22:02) (10 - 17)  SpO2: 98% (06-02 @ 22:02) (94% - 98%)  06-02-22 @ 07:01  -  06-03-22 @ 02:14  --------------------------------------------------------  IN:  Total IN: 0 mL    OUT:    Voided (mL): 550 mL  Total OUT: 550 mL    Total NET: -550 mL        Physical Exam  General: comfortable sitting upright in bed.   back: wound vac holding suction, no palpable collections, no drainage    Labs:                        11.6   5.71  )-----------( 458      ( 01 Jun 2022 11:40 )             36.5   06-01    140  |  100  |  11  ----------------------------<  77  3.9   |  26  |  0.60    Ca    9.7      01 Jun 2022 11:40    TPro  9.1<H>  /  Alb  3.9  /  TBili  0.4  /  DBili  x   /  AST  18  /  ALT  10  /  AlkPhos  133<H>  06-01    CAPILLARY BLOOD GLUCOSE      POCT Blood Glucose.: 163 mg/dL (02 Jun 2022 22:19)  POCT Blood Glucose.: 154 mg/dL (02 Jun 2022 20:14)  POCT Blood Glucose.: 123 mg/dL (02 Jun 2022 19:53)  POCT Blood Glucose.: 83 mg/dL (02 Jun 2022 19:39)  POCT Blood Glucose.: 73 mg/dL (02 Jun 2022 19:14)  POCT Blood Glucose.: 63 mg/dL (02 Jun 2022 18:57)  POCT Blood Glucose.: 59 mg/dL (02 Jun 2022 18:56)      Medications:   MEDICATIONS  (STANDING):  acetaminophen     Tablet .. 975 milliGRAM(s) Oral every 6 hours  atorvastatin 20 milliGRAM(s) Oral at bedtime  dextrose 5%. 1000 milliLiter(s) (100 mL/Hr) IV Continuous <Continuous>  dextrose 50% Injectable 25 Gram(s) IV Push once  dextrose 50% Injectable 12.5 Gram(s) IV Push once  dextrose 50% Injectable 25 Gram(s) IV Push once  dextrose 50% Injectable 50 milliLiter(s) IV Push once  glucagon  Injectable 1 milliGRAM(s) IntraMuscular once  heparin   Injectable 5000 Unit(s) SubCutaneous every 8 hours  insulin lispro (ADMELOG) corrective regimen sliding scale   SubCutaneous three times a day before meals  insulin lispro (ADMELOG) corrective regimen sliding scale   SubCutaneous at bedtime  levothyroxine 112 MICROGram(s) Oral daily  losartan 100 milliGRAM(s) Oral daily    MEDICATIONS  (PRN):  dextrose Oral Gel 15 Gram(s) Oral once PRN Blood Glucose LESS THAN 70 milliGRAM(s)/deciliter  ondansetron Injectable 4 milliGRAM(s) IV Push every 8 hours PRN Nausea and/or Vomiting  oxyCODONE    IR 5 milliGRAM(s) Oral every 4 hours PRN Moderate Pain (4 - 6)  oxyCODONE    IR 10 milliGRAM(s) Oral every 4 hours PRN Severe Pain (7 - 10)      Imaging:     Subjective:   Patient seen at bedside this AM. Reports feeling well, without complaints. Denies chest pain, SOB. Tolerating diet without N/V.     24h Events:   - Overnight, no acute events    Objective:  Vital Signs  T(C): 36.6 (06-02 @ 22:02), Max: 36.6 (06-02 @ 14:11)  HR: 78 (06-02 @ 22:02) (71 - 90)  BP: 162/84 (06-02 @ 22:02) (134/97 - 164/86)  RR: 16 (06-02 @ 22:02) (10 - 17)  SpO2: 98% (06-02 @ 22:02) (94% - 98%)  06-02-22 @ 07:01  -  06-03-22 @ 02:14  --------------------------------------------------------  IN:  Total IN: 0 mL    OUT:    Voided (mL): 550 mL  Total OUT: 550 mL    Total NET: -550 mL        Physical Exam  General: comfortable sitting upright in bed.   back: wound vac holding suction, no palpable collections, no drainage, VAC in place.    Labs:                        11.6   5.71  )-----------( 458      ( 01 Jun 2022 11:40 )             36.5   06-01    140  |  100  |  11  ----------------------------<  77  3.9   |  26  |  0.60    Ca    9.7      01 Jun 2022 11:40    TPro  9.1<H>  /  Alb  3.9  /  TBili  0.4  /  DBili  x   /  AST  18  /  ALT  10  /  AlkPhos  133<H>  06-01    CAPILLARY BLOOD GLUCOSE      POCT Blood Glucose.: 163 mg/dL (02 Jun 2022 22:19)  POCT Blood Glucose.: 154 mg/dL (02 Jun 2022 20:14)  POCT Blood Glucose.: 123 mg/dL (02 Jun 2022 19:53)  POCT Blood Glucose.: 83 mg/dL (02 Jun 2022 19:39)  POCT Blood Glucose.: 73 mg/dL (02 Jun 2022 19:14)  POCT Blood Glucose.: 63 mg/dL (02 Jun 2022 18:57)  POCT Blood Glucose.: 59 mg/dL (02 Jun 2022 18:56)      Medications:   MEDICATIONS  (STANDING):  acetaminophen     Tablet .. 975 milliGRAM(s) Oral every 6 hours  atorvastatin 20 milliGRAM(s) Oral at bedtime  dextrose 5%. 1000 milliLiter(s) (100 mL/Hr) IV Continuous <Continuous>  dextrose 50% Injectable 25 Gram(s) IV Push once  dextrose 50% Injectable 12.5 Gram(s) IV Push once  dextrose 50% Injectable 25 Gram(s) IV Push once  dextrose 50% Injectable 50 milliLiter(s) IV Push once  glucagon  Injectable 1 milliGRAM(s) IntraMuscular once  heparin   Injectable 5000 Unit(s) SubCutaneous every 8 hours  insulin lispro (ADMELOG) corrective regimen sliding scale   SubCutaneous three times a day before meals  insulin lispro (ADMELOG) corrective regimen sliding scale   SubCutaneous at bedtime  levothyroxine 112 MICROGram(s) Oral daily  losartan 100 milliGRAM(s) Oral daily    MEDICATIONS  (PRN):  dextrose Oral Gel 15 Gram(s) Oral once PRN Blood Glucose LESS THAN 70 milliGRAM(s)/deciliter  ondansetron Injectable 4 milliGRAM(s) IV Push every 8 hours PRN Nausea and/or Vomiting  oxyCODONE    IR 5 milliGRAM(s) Oral every 4 hours PRN Moderate Pain (4 - 6)  oxyCODONE    IR 10 milliGRAM(s) Oral every 4 hours PRN Severe Pain (7 - 10)      Imaging:

## 2022-06-03 NOTE — DISCHARGE NOTE PROVIDER - HOSPITAL COURSE
62 yo male with a cyst on his back that is draining. Patient was admitted to [facility] after planned excision of sebaceous cyst on back. Tolerated procedure well without complication. Transferred postoperatively to surgical floor. Pain medications transitioned for IV to oral. Diet advanced to regular as tolerated. Patient requiring wound vac equipement delivered to home.   Patient currently tolerating regular diet, ambulating at baseline, voiding and pain is well controlled.  Per team and attending patient is hemodynamically stable for discharge home with VNS services for wound care and wound vac changes.

## 2022-06-03 NOTE — DISCHARGE NOTE PROVIDER - NSDCMRMEDTOKEN_GEN_ALL_CORE_FT
atorvastatin 20 mg oral tablet: 1 tab(s) orally once a day  Eliquis 5 mg oral tablet: 1 tab(s) orally 2 times a day  levothyroxine 112 mcg (0.112 mg) oral tablet: 1 tab(s) orally once a day  Lonsurf 20 mg-8.19 mg oral tablet: 4 tabs/ day  losartan 100 mg oral tablet: 1 tab(s) orally once a day  metFORMIN 750 mg oral tablet, extended release: 1 tab(s) orally once a day (in the evening)  oxyCODONE 5 mg oral tablet: 1 tab(s) orally every 6 hours, As Needed  OxyCONTIN 10 mg oral tablet, extended release: 1 tab(s) orally every 12 hours   acetaminophen 325 mg oral tablet: 3 tab(s) orally every 6 hours  atorvastatin 20 mg oral tablet: 1 tab(s) orally once a day  Eliquis 5 mg oral tablet: 1 tab(s) orally 2 times a day  levothyroxine 112 mcg (0.112 mg) oral tablet: 1 tab(s) orally once a day  Lonsurf 20 mg-8.19 mg oral tablet: 4 tabs/ day  losartan 100 mg oral tablet: 1 tab(s) orally once a day  metFORMIN 750 mg oral tablet, extended release: 1 tab(s) orally once a day (in the evening)  oxyCODONE 5 mg oral tablet: 1 tab(s) orally every 6 hours, As Needed  OxyCONTIN 10 mg oral tablet, extended release: 1 tab(s) orally every 12 hours   acetaminophen 325 mg oral tablet: 3 tab(s) orally every 6 hours  atorvastatin 20 mg oral tablet: 1 tab(s) orally once a day  Eliquis 5 mg oral tablet: 1 tab(s) orally 2 times a day  levothyroxine 112 mcg (0.112 mg) oral tablet: 1 tab(s) orally once a day  Lonsurf 20 mg-8.19 mg oral tablet: 4 tabs/ day  losartan 100 mg oral tablet: 1 tab(s) orally once a day  metFORMIN 750 mg oral tablet, extended release: 1 tab(s) orally once a day (in the evening)

## 2022-06-03 NOTE — DISCHARGE NOTE PROVIDER - CARE PROVIDER_API CALL
Rik Friend)  Surgery  [address] [facility], Division of Surgical [place] [address]  Phone: [phone]  Fax: [phone]  Follow Up Time:

## 2022-06-03 NOTE — DISCHARGE NOTE NURSING/CASE MANAGEMENT/SOCIAL WORK - PATIENT PORTAL LINK FT
You can access the FollowMyHealth Patient Portal offered by [facility] by registering at the following website: [email or web address]. By joining [employer]’s FollowMyHealth portal, you will also be able to view your health information using other applications (apps) compatible with our system.

## 2022-06-04 LAB
GLUCOSE BLDC GLUCOMTR-MCNC: 119 MG/DL — HIGH (ref 70–99)
GLUCOSE BLDC GLUCOMTR-MCNC: 121 MG/DL — HIGH (ref 70–99)
GLUCOSE BLDC GLUCOMTR-MCNC: 83 MG/DL — SIGNIFICANT CHANGE UP (ref 70–99)
GLUCOSE BLDC GLUCOMTR-MCNC: 98 MG/DL — SIGNIFICANT CHANGE UP (ref 70–99)

## 2022-06-04 RX ORDER — SENNA PLUS 8.6 MG/1
2 TABLET ORAL AT BEDTIME
Refills: 0 | Status: DISCONTINUED | OUTPATIENT
Start: 2022-06-04 | End: 2022-06-07

## 2022-06-04 RX ADMIN — Medication 975 MILLIGRAM(S): at 18:02

## 2022-06-04 RX ADMIN — ATORVASTATIN CALCIUM 20 MILLIGRAM(S): 80 TABLET, FILM COATED ORAL at 22:08

## 2022-06-04 RX ADMIN — Medication 975 MILLIGRAM(S): at 12:24

## 2022-06-04 RX ADMIN — PANTOPRAZOLE SODIUM 40 MILLIGRAM(S): 20 TABLET, DELAYED RELEASE ORAL at 06:05

## 2022-06-04 RX ADMIN — ENOXAPARIN SODIUM 40 MILLIGRAM(S): 100 INJECTION SUBCUTANEOUS at 22:09

## 2022-06-04 RX ADMIN — ENOXAPARIN SODIUM 40 MILLIGRAM(S): 100 INJECTION SUBCUTANEOUS at 06:04

## 2022-06-04 RX ADMIN — Medication 975 MILLIGRAM(S): at 23:57

## 2022-06-04 RX ADMIN — Medication 975 MILLIGRAM(S): at 18:36

## 2022-06-04 RX ADMIN — LOSARTAN POTASSIUM 100 MILLIGRAM(S): 100 TABLET, FILM COATED ORAL at 06:03

## 2022-06-04 RX ADMIN — Medication 975 MILLIGRAM(S): at 06:02

## 2022-06-04 RX ADMIN — Medication 112 MICROGRAM(S): at 06:03

## 2022-06-04 RX ADMIN — Medication 1 TABLET(S): at 06:03

## 2022-06-04 NOTE — DIETITIAN INITIAL EVALUATION ADULT - PERTINENT MEDS FT
MEDICATIONS  (STANDING):  acetaminophen     Tablet .. 975 milliGRAM(s) Oral every 6 hours  atorvastatin 20 milliGRAM(s) Oral at bedtime  dextrose 5%. 1000 milliLiter(s) (100 mL/Hr) IV Continuous <Continuous>  dextrose 50% Injectable 25 Gram(s) IV Push once  dextrose 50% Injectable 12.5 Gram(s) IV Push once  dextrose 50% Injectable 25 Gram(s) IV Push once  enoxaparin Injectable 40 milliGRAM(s) SubCutaneous every 24 hours  glucagon  Injectable 1 milliGRAM(s) IntraMuscular once  insulin lispro (ADMELOG) corrective regimen sliding scale   SubCutaneous three times a day before meals  insulin lispro (ADMELOG) corrective regimen sliding scale   SubCutaneous at bedtime  levothyroxine 112 MICROGram(s) Oral daily  losartan 100 milliGRAM(s) Oral daily  pantoprazole    Tablet 40 milliGRAM(s) Oral before breakfast    MEDICATIONS  (PRN):  dextrose Oral Gel 15 Gram(s) Oral once PRN Blood Glucose LESS THAN 70 milliGRAM(s)/deciliter  ondansetron Injectable 4 milliGRAM(s) IV Push every 8 hours PRN Nausea and/or Vomiting  oxyCODONE    IR 5 milliGRAM(s) Oral every 4 hours PRN Moderate Pain (4 - 6)  oxyCODONE    IR 10 milliGRAM(s) Oral every 4 hours PRN Severe Pain (7 - 10)

## 2022-06-04 NOTE — PROGRESS NOTE ADULT - SUBJECTIVE AND OBJECTIVE BOX
Subjective:   Patient seen at bedside this AM. Reports feeling well, without complaints. Denies chest pain, SOB. Tolerating diet without N/V.     24h Events:   - Overnight, no acute events    Objective:  Vital Signs  T(C): 36.3 (06-03 @ 21:27), Max: 36.7 (06-03 @ 12:10)  HR: 73 (06-03 @ 21:27) (69 - 78)  BP: 137/86 (06-03 @ 21:27) (137/86 - 144/96)  RR: 18 (06-03 @ 21:27) (16 - 18)  SpO2: 99% (06-03 @ 21:27) (99% - 100%)  06-02-22 @ 07:01  -  06-03-22 @ 07:00  --------------------------------------------------------  IN:  Total IN: 0 mL    OUT:    Voided (mL): 550 mL  Total OUT: 550 mL    Total NET: -550 mL        Physical Exam  General: comfortable sitting upright in bed.   back: wound vac holding suction, no palpable collections, no drainage, VAC in place.      Labs:                        10.5   3.45  )-----------( 415      ( 03 Jun 2022 06:50 )             32.1   06-03    136  |  101  |  10  ----------------------------<  116<H>  3.9   |  23  |  0.48<L>    Ca    9.1      03 Jun 2022 06:50  Phos  2.4     06-03  Mg     1.60     06-03      CAPILLARY BLOOD GLUCOSE      POCT Blood Glucose.: 115 mg/dL (03 Jun 2022 20:55)  POCT Blood Glucose.: 110 mg/dL (03 Jun 2022 17:46)  POCT Blood Glucose.: 132 mg/dL (03 Jun 2022 13:05)  POCT Blood Glucose.: 129 mg/dL (03 Jun 2022 09:15)      Medications:   MEDICATIONS  (STANDING):  acetaminophen     Tablet .. 975 milliGRAM(s) Oral every 6 hours  atorvastatin 20 milliGRAM(s) Oral at bedtime  dextrose 5%. 1000 milliLiter(s) (100 mL/Hr) IV Continuous <Continuous>  dextrose 50% Injectable 25 Gram(s) IV Push once  dextrose 50% Injectable 12.5 Gram(s) IV Push once  dextrose 50% Injectable 25 Gram(s) IV Push once  enoxaparin Injectable 40 milliGRAM(s) SubCutaneous every 24 hours  glucagon  Injectable 1 milliGRAM(s) IntraMuscular once  insulin lispro (ADMELOG) corrective regimen sliding scale   SubCutaneous three times a day before meals  insulin lispro (ADMELOG) corrective regimen sliding scale   SubCutaneous at bedtime  levothyroxine 112 MICROGram(s) Oral daily  losartan 100 milliGRAM(s) Oral daily  pantoprazole    Tablet 40 milliGRAM(s) Oral before breakfast  potassium phosphate / sodium phosphate Tablet (K-PHOS No. 2) 1 Tablet(s) Oral two times a day    MEDICATIONS  (PRN):  dextrose Oral Gel 15 Gram(s) Oral once PRN Blood Glucose LESS THAN 70 milliGRAM(s)/deciliter  ondansetron Injectable 4 milliGRAM(s) IV Push every 8 hours PRN Nausea and/or Vomiting  oxyCODONE    IR 5 milliGRAM(s) Oral every 4 hours PRN Moderate Pain (4 - 6)  oxyCODONE    IR 10 milliGRAM(s) Oral every 4 hours PRN Severe Pain (7 - 10)      Imaging:

## 2022-06-04 NOTE — DIETITIAN INITIAL EVALUATION ADULT - OTHER INFO
Pt has a history of HTN, DM2,Thyroidectomy, Malignant Neoplasm of Appendix, Gastritis, Gallstones, Pulmonary Embolism and HLD. Pt presented with infected sebaceous cyst of back and is s/p excision of cyst.   Pt states his appetite and po intake have been good. He had no complaints of GI distress nor of difficulty chewing or swallowing. Pt has no known food allergies.   Pt's usual weight is 132 to 135 lbs. His admission weight was 134.6 lbs. Pt was consulted fo Bariatric surgery. Pt has no history of this surgery.   Pt states he knows and follows a consistent carb diet at home. His Hgb A1c was 5.4 indicating good control. Reviewed diet with Pt and family.

## 2022-06-04 NOTE — DIETITIAN INITIAL EVALUATION ADULT - PERTINENT LABORATORY DATA
06-03    136  |  101  |  10  ----------------------------<  116<H>  3.9   |  23  |  0.48<L>    Ca    9.1      03 Jun 2022 06:50  Phos  2.4     06-03  Mg     1.60     06-03    POCT Blood Glucose.: 119 mg/dL (06-04-22 @ 12:13)  A1C with Estimated Average Glucose Result: 5.4 % (06-01-22 @ 11:40)

## 2022-06-04 NOTE — PROGRESS NOTE ADULT - ASSESSMENT
61M s/p back excision with wound vac placement    Plan:  - Wound vac ordered for home  - restart eliquis POD3  - Regular diet  - Discharge today pending wound vac/VNS    D Surgery  [id number] 61M s/p back excision with wound vac placement on 6/2/2022    Plan:  - Wound vac ordered for home  - restart eliquis POD3 (6/3/2022)  - Regular diet  - Discharge today pending wound vac/VNS    D Surgery  [id number]

## 2022-06-05 LAB
GLUCOSE BLDC GLUCOMTR-MCNC: 104 MG/DL — HIGH (ref 70–99)
GLUCOSE BLDC GLUCOMTR-MCNC: 129 MG/DL — HIGH (ref 70–99)
GLUCOSE BLDC GLUCOMTR-MCNC: 78 MG/DL — SIGNIFICANT CHANGE UP (ref 70–99)
GLUCOSE BLDC GLUCOMTR-MCNC: 80 MG/DL — SIGNIFICANT CHANGE UP (ref 70–99)
GLUCOSE BLDC GLUCOMTR-MCNC: 87 MG/DL — SIGNIFICANT CHANGE UP (ref 70–99)
GLUCOSE BLDC GLUCOMTR-MCNC: 91 MG/DL — SIGNIFICANT CHANGE UP (ref 70–99)
GLUCOSE BLDC GLUCOMTR-MCNC: 95 MG/DL — SIGNIFICANT CHANGE UP (ref 70–99)

## 2022-06-05 RX ADMIN — ENOXAPARIN SODIUM 40 MILLIGRAM(S): 100 INJECTION SUBCUTANEOUS at 22:16

## 2022-06-05 RX ADMIN — PANTOPRAZOLE SODIUM 40 MILLIGRAM(S): 20 TABLET, DELAYED RELEASE ORAL at 05:17

## 2022-06-05 RX ADMIN — Medication 975 MILLIGRAM(S): at 00:13

## 2022-06-05 RX ADMIN — Medication 975 MILLIGRAM(S): at 11:19

## 2022-06-05 RX ADMIN — SENNA PLUS 2 TABLET(S): 8.6 TABLET ORAL at 07:30

## 2022-06-05 RX ADMIN — Medication 975 MILLIGRAM(S): at 17:21

## 2022-06-05 RX ADMIN — Medication 975 MILLIGRAM(S): at 05:16

## 2022-06-05 RX ADMIN — Medication 975 MILLIGRAM(S): at 17:13

## 2022-06-05 RX ADMIN — Medication 975 MILLIGRAM(S): at 06:13

## 2022-06-05 RX ADMIN — ATORVASTATIN CALCIUM 20 MILLIGRAM(S): 80 TABLET, FILM COATED ORAL at 22:17

## 2022-06-05 RX ADMIN — Medication 112 MICROGRAM(S): at 05:16

## 2022-06-05 RX ADMIN — LOSARTAN POTASSIUM 100 MILLIGRAM(S): 100 TABLET, FILM COATED ORAL at 05:16

## 2022-06-05 NOTE — PROGRESS NOTE ADULT - SUBJECTIVE AND OBJECTIVE BOX
Surgery Progress Note    INTERVAl/SUBJECTIVE: No acute event overnight. Patient seen and examined in am rounds, found to be without acute distress.      Vital Signs Last 24 Hrs  T(C): 36.6 (04 Jun 2022 20:40), Max: 36.6 (04 Jun 2022 11:36)  T(F): 97.9 (04 Jun 2022 20:40), Max: 97.9 (04 Jun 2022 11:36)  HR: 69 (04 Jun 2022 20:40) (69 - 73)  BP: 146/84 (04 Jun 2022 20:40) (130/77 - 146/84)  BP(mean): --  RR: 17 (04 Jun 2022 20:40) (16 - 17)  SpO2: 100% (04 Jun 2022 20:40) (99% - 100%)    Physical Exam:  General: Appears well, NAD  CHEST: breathing comfortably  CV: appears well perfused  Abdomen: soft, nontender, nondistended, no rebound or guarding  Extremities: Grossly symmetric    LABS:                        10.5   3.45  )-----------( 415      ( 03 Jun 2022 06:50 )             32.1     06-03    136  |  101  |  10  ----------------------------<  116<H>  3.9   |  23  |  0.48<L>    Ca    9.1      03 Jun 2022 06:50  Phos  2.4     06-03  Mg     1.60     06-03            INs and OUTs:    06-04-22 @ 07:01  -  06-05-22 @ 01:11  --------------------------------------------------------  IN: 120 mL / OUT: 425 mL / NET: -305 mL     Surgery Progress Note    INTERVAl/SUBJECTIVE: No acute event overnight. Patient seen and examined in am rounds, found to be without acute distress.      Vital Signs Last 24 Hrs  T(C): 36.6 (04 Jun 2022 20:40), Max: 36.6 (04 Jun 2022 11:36)  T(F): 97.9 (04 Jun 2022 20:40), Max: 97.9 (04 Jun 2022 11:36)  HR: 69 (04 Jun 2022 20:40) (69 - 73)  BP: 146/84 (04 Jun 2022 20:40) (130/77 - 146/84)  BP(mean): --  RR: 17 (04 Jun 2022 20:40) (16 - 17)  SpO2: 100% (04 Jun 2022 20:40) (99% - 100%)    Physical Exam:  General: Appears well, NAD  CHEST: breathing comfortably  CV: appears well perfused  Abdomen: soft, nontender, nondistended, no rebound or guarding; back vac to suction  Extremities: Grossly symmetric    LABS:                        10.5   3.45  )-----------( 415      ( 03 Jun 2022 06:50 )             32.1     06-03    136  |  101  |  10  ----------------------------<  116<H>  3.9   |  23  |  0.48<L>    Ca    9.1      03 Jun 2022 06:50  Phos  2.4     06-03  Mg     1.60     06-03            INs and OUTs:    06-04-22 @ 07:01  -  06-05-22 @ 01:11  --------------------------------------------------------  IN: 120 mL / OUT: 425 mL / NET: -305 mL

## 2022-06-05 NOTE — PROGRESS NOTE ADULT - ASSESSMENT
61M s/p back excision with wound vac placement on 6/2/2022    Plan:  - Wound vac ordered for home  - restart eliquis POD3 (6/3/2022)  - Regular diet  - Discharge today pending wound vac/VNS    D Surgery  [id number] 61M s/p back excision with wound vac placement on 6/2/2022    Plan:  - Wound vac ordered for home  - restarted eliquis POD3 (6/3/2022) for hx of PE/DVT  - Regular diet  - Discharge today pending wound vac/VNS    D Surgery  [id number]

## 2022-06-06 LAB
GLUCOSE BLDC GLUCOMTR-MCNC: 111 MG/DL — HIGH (ref 70–99)
GLUCOSE BLDC GLUCOMTR-MCNC: 123 MG/DL — HIGH (ref 70–99)
GLUCOSE BLDC GLUCOMTR-MCNC: 89 MG/DL — SIGNIFICANT CHANGE UP (ref 70–99)
GLUCOSE BLDC GLUCOMTR-MCNC: 89 MG/DL — SIGNIFICANT CHANGE UP (ref 70–99)

## 2022-06-06 RX ORDER — ACETAMINOPHEN 500 MG
3 TABLET ORAL
Qty: 0 | Refills: 0 | DISCHARGE
Start: 2022-06-06

## 2022-06-06 RX ORDER — APIXABAN 2.5 MG/1
5 TABLET, FILM COATED ORAL EVERY 12 HOURS
Refills: 0 | Status: DISCONTINUED | OUTPATIENT
Start: 2022-06-06 | End: 2022-06-07

## 2022-06-06 RX ADMIN — Medication 975 MILLIGRAM(S): at 00:41

## 2022-06-06 RX ADMIN — Medication 975 MILLIGRAM(S): at 01:30

## 2022-06-06 RX ADMIN — Medication 975 MILLIGRAM(S): at 05:32

## 2022-06-06 RX ADMIN — PANTOPRAZOLE SODIUM 40 MILLIGRAM(S): 20 TABLET, DELAYED RELEASE ORAL at 05:33

## 2022-06-06 RX ADMIN — Medication 975 MILLIGRAM(S): at 06:32

## 2022-06-06 RX ADMIN — APIXABAN 5 MILLIGRAM(S): 2.5 TABLET, FILM COATED ORAL at 18:02

## 2022-06-06 RX ADMIN — LOSARTAN POTASSIUM 100 MILLIGRAM(S): 100 TABLET, FILM COATED ORAL at 05:33

## 2022-06-06 RX ADMIN — Medication 112 MICROGRAM(S): at 05:33

## 2022-06-06 RX ADMIN — ATORVASTATIN CALCIUM 20 MILLIGRAM(S): 80 TABLET, FILM COATED ORAL at 21:53

## 2022-06-06 NOTE — PROGRESS NOTE ADULT - SUBJECTIVE AND OBJECTIVE BOX
Subjective:   Patient seen at bedside this AM. Reports feeling well, without complaints. Denies chest pain, SOB. Tolerating diet without N/V.     24h Events:   - Overnight, no acute events    Objective:  Vital Signs  T(C): 36.8 (06-06 @ 00:43), Max: 36.8 (06-06 @ 00:43)  HR: 62 (06-06 @ 00:43) (60 - 73)  BP: 148/80 (06-06 @ 00:43) (135/87 - 155/80)  RR: 18 (06-06 @ 00:43) (18 - 18)  SpO2: 100% (06-06 @ 00:43) (98% - 100%)  06-04-22 @ 07:01  -  06-05-22 @ 07:00  --------------------------------------------------------  IN:  Total IN: 0 mL    OUT:    VAC (Vacuum Assisted Closure) System (mL): 26 mL    Voided (mL): 400 mL  Total OUT: 426 mL    Total NET: -426 mL      06-05-22 @ 07:01  -  06-06-22 @ 01:22  --------------------------------------------------------  IN:  Total IN: 0 mL    OUT:    Voided (mL): 300 mL  Total OUT: 300 mL    Total NET: -300 mL    Physical Exam:  General: Appears well, NAD  CHEST: breathing comfortably  CV: appears well perfused  Abdomen: soft, nontender, nondistended, no rebound or guarding; back vac to suction  Extremities: Grossly symmetric        Labs:        CAPILLARY BLOOD GLUCOSE      POCT Blood Glucose.: 95 mg/dL (05 Jun 2022 21:29)  POCT Blood Glucose.: 104 mg/dL (05 Jun 2022 17:59)  POCT Blood Glucose.: 91 mg/dL (05 Jun 2022 13:16)  POCT Blood Glucose.: 129 mg/dL (05 Jun 2022 09:49)  POCT Blood Glucose.: 80 mg/dL (05 Jun 2022 09:14)  POCT Blood Glucose.: 87 mg/dL (05 Jun 2022 08:54)  POCT Blood Glucose.: 78 mg/dL (05 Jun 2022 08:52)      Medications:   MEDICATIONS  (STANDING):  acetaminophen     Tablet .. 975 milliGRAM(s) Oral every 6 hours  atorvastatin 20 milliGRAM(s) Oral at bedtime  dextrose 5%. 1000 milliLiter(s) (100 mL/Hr) IV Continuous <Continuous>  dextrose 50% Injectable 25 Gram(s) IV Push once  dextrose 50% Injectable 12.5 Gram(s) IV Push once  dextrose 50% Injectable 25 Gram(s) IV Push once  enoxaparin Injectable 40 milliGRAM(s) SubCutaneous every 24 hours  glucagon  Injectable 1 milliGRAM(s) IntraMuscular once  insulin lispro (ADMELOG) corrective regimen sliding scale   SubCutaneous three times a day before meals  insulin lispro (ADMELOG) corrective regimen sliding scale   SubCutaneous at bedtime  levothyroxine 112 MICROGram(s) Oral daily  losartan 100 milliGRAM(s) Oral daily  pantoprazole    Tablet 40 milliGRAM(s) Oral before breakfast    MEDICATIONS  (PRN):  dextrose Oral Gel 15 Gram(s) Oral once PRN Blood Glucose LESS THAN 70 milliGRAM(s)/deciliter  ondansetron Injectable 4 milliGRAM(s) IV Push every 8 hours PRN Nausea and/or Vomiting  oxyCODONE    IR 5 milliGRAM(s) Oral every 4 hours PRN Moderate Pain (4 - 6)  oxyCODONE    IR 10 milliGRAM(s) Oral every 4 hours PRN Severe Pain (7 - 10)  senna 2 Tablet(s) Oral at bedtime PRN Constipation      Imaging:

## 2022-06-06 NOTE — PROGRESS NOTE ADULT - ASSESSMENT
61M s/p back excision with wound vac placement on 6/2/2022    Plan:  - Wound vac ordered for home  - restarted eliquis POD3 (6/3/2022) for hx of PE/DVT  - Regular diet  - Discharge today pending wound vac/VNS    D Surgery  [id number]

## 2022-06-07 VITALS
RESPIRATION RATE: 18 BRPM | DIASTOLIC BLOOD PRESSURE: 87 MMHG | HEART RATE: 81 BPM | TEMPERATURE: 98 F | OXYGEN SATURATION: 100 % | SYSTOLIC BLOOD PRESSURE: 136 MMHG

## 2022-06-07 LAB — GLUCOSE BLDC GLUCOMTR-MCNC: 92 MG/DL — SIGNIFICANT CHANGE UP (ref 70–99)

## 2022-06-07 RX ORDER — OXYCODONE HYDROCHLORIDE 5 MG/1
1 TABLET ORAL
Qty: 0 | Refills: 0 | DISCHARGE

## 2022-06-07 RX ADMIN — PANTOPRAZOLE SODIUM 40 MILLIGRAM(S): 20 TABLET, DELAYED RELEASE ORAL at 06:30

## 2022-06-07 RX ADMIN — Medication 975 MILLIGRAM(S): at 06:31

## 2022-06-07 RX ADMIN — Medication 112 MICROGRAM(S): at 06:30

## 2022-06-07 RX ADMIN — APIXABAN 5 MILLIGRAM(S): 2.5 TABLET, FILM COATED ORAL at 06:30

## 2022-06-07 RX ADMIN — LOSARTAN POTASSIUM 100 MILLIGRAM(S): 100 TABLET, FILM COATED ORAL at 06:29

## 2022-06-07 NOTE — PROGRESS NOTE ADULT - SUBJECTIVE AND OBJECTIVE BOX
Subjective:   Patient seen at bedside this AM. Reports feeling well, without complaints. Denies chest pain, SOB. Tolerating diet without N/V.     24h Events:   - Overnight, no acute events    Objective:  Vital Signs  T(C): 36.8 (06-06 @ 20:07), Max: 36.8 (06-06 @ 16:59)  HR: 69 (06-06 @ 20:07) (64 - 84)  BP: 127/73 (06-06 @ 20:07) (127/73 - 157/76)  RR: 18 (06-06 @ 20:07) (17 - 18)  SpO2: 98% (06-06 @ 20:07) (98% - 100%)  06-05-22 @ 07:01  -  06-06-22 @ 07:00  --------------------------------------------------------  IN:  Total IN: 0 mL    OUT:    Voided (mL): 300 mL  Total OUT: 300 mL    Total NET: -300 mL      06-06-22 @ 07:01  -  06-07-22 @ 01:21  --------------------------------------------------------  IN:  Total IN: 0 mL    OUT:    VAC (Vacuum Assisted Closure) System (mL): 25 mL    Voided (mL): 600 mL  Total OUT: 625 mL    Total NET: -625 mL        Physical Exam:  General: Appears well, NAD  CHEST: breathing comfortably  CV: appears well perfused  Abdomen: soft, nontender, nondistended, no rebound or guarding; back vac to suction  Extremities: Grossly symmetric    Labs:        CAPILLARY BLOOD GLUCOSE      POCT Blood Glucose.: 89 mg/dL (06 Jun 2022 20:57)  POCT Blood Glucose.: 89 mg/dL (06 Jun 2022 17:31)  POCT Blood Glucose.: 123 mg/dL (06 Jun 2022 12:40)  POCT Blood Glucose.: 111 mg/dL (06 Jun 2022 09:17)      Medications:   MEDICATIONS  (STANDING):  acetaminophen     Tablet .. 975 milliGRAM(s) Oral every 6 hours  apixaban 5 milliGRAM(s) Oral every 12 hours  atorvastatin 20 milliGRAM(s) Oral at bedtime  dextrose 50% Injectable 25 Gram(s) IV Push once  dextrose 50% Injectable 12.5 Gram(s) IV Push once  dextrose 50% Injectable 25 Gram(s) IV Push once  glucagon  Injectable 1 milliGRAM(s) IntraMuscular once  insulin lispro (ADMELOG) corrective regimen sliding scale   SubCutaneous three times a day before meals  insulin lispro (ADMELOG) corrective regimen sliding scale   SubCutaneous at bedtime  levothyroxine 112 MICROGram(s) Oral daily  losartan 100 milliGRAM(s) Oral daily  pantoprazole    Tablet 40 milliGRAM(s) Oral before breakfast    MEDICATIONS  (PRN):  dextrose Oral Gel 15 Gram(s) Oral once PRN Blood Glucose LESS THAN 70 milliGRAM(s)/deciliter  ondansetron Injectable 4 milliGRAM(s) IV Push every 8 hours PRN Nausea and/or Vomiting  oxyCODONE    IR 5 milliGRAM(s) Oral every 4 hours PRN Moderate Pain (4 - 6)  oxyCODONE    IR 10 milliGRAM(s) Oral every 4 hours PRN Severe Pain (7 - 10)  senna 2 Tablet(s) Oral at bedtime PRN Constipation      Imaging:     Subjective:   Patient seen at bedside this AM. Reports feeling well, without complaints. Denies chest pain, SOB. Tolerating diet without N/V.     24h Events:   - Overnight, no acute events    Objective:  Vital Signs  T(C): 36.8 (06-06 @ 20:07), Max: 36.8 (06-06 @ 16:59)  HR: 69 (06-06 @ 20:07) (64 - 84)  BP: 127/73 (06-06 @ 20:07) (127/73 - 157/76)  RR: 18 (06-06 @ 20:07) (17 - 18)  SpO2: 98% (06-06 @ 20:07) (98% - 100%)  06-05-22 @ 07:01  -  06-06-22 @ 07:00  --------------------------------------------------------  IN:  Total IN: 0 mL    OUT:     Voided (mL): 300 mL  Total OUT: 300 mL    Total NET: -300 mL      06-06-22 @ 07:01  -  06-07-22 @ 01:21  --------------------------------------------------------  IN:  Total IN: 0 mL    OUT:    VAC (Vacuum Assisted Closure) System (mL): 25 mL    Voided (mL): 600 mL  Total OUT: 625 mL    Total NET: -625 mL        Physical Exam:  General: Appears well, NAD  CHEST: breathing comfortably  CV: appears well perfused  Abdomen: soft, nontender, nondistended, no rebound or guarding; back vac to suction  Extremities: Grossly symmetric    Labs:        CAPILLARY BLOOD GLUCOSE      POCT Blood Glucose.: 89 mg/dL (06 Jun 2022 20:57)  POCT Blood Glucose.: 89 mg/dL (06 Jun 2022 17:31)  POCT Blood Glucose.: 123 mg/dL (06 Jun 2022 12:40)  POCT Blood Glucose.: 111 mg/dL (06 Jun 2022 09:17)      Medications:   MEDICATIONS  (STANDING):  acetaminophen     Tablet .. 975 milliGRAM(s) Oral every 6 hours  apixaban 5 milliGRAM(s) Oral every 12 hours  atorvastatin 20 milliGRAM(s) Oral at bedtime  dextrose 50% Injectable 25 Gram(s) IV Push once  dextrose 50% Injectable 12.5 Gram(s) IV Push once  dextrose 50% Injectable 25 Gram(s) IV Push once  glucagon  Injectable 1 milliGRAM(s) IntraMuscular once  insulin lispro (ADMELOG) corrective regimen sliding scale   SubCutaneous three times a day before meals  insulin lispro (ADMELOG) corrective regimen sliding scale   SubCutaneous at bedtime  levothyroxine 112 MICROGram(s) Oral daily  losartan 100 milliGRAM(s) Oral daily  pantoprazole    Tablet 40 milliGRAM(s) Oral before breakfast    MEDICATIONS  (PRN):  dextrose Oral Gel 15 Gram(s) Oral once PRN Blood Glucose LESS THAN 70 milliGRAM(s)/deciliter  ondansetron Injectable 4 milliGRAM(s) IV Push every 8 hours PRN Nausea and/or Vomiting  oxyCODONE    IR 5 milliGRAM(s) Oral every 4 hours PRN Moderate Pain (4 - 6)  oxyCODONE    IR 10 milliGRAM(s) Oral every 4 hours PRN Severe Pain (7 - 10)  senna 2 Tablet(s) Oral at bedtime PRN Constipation      Imaging:

## 2022-06-07 NOTE — PROGRESS NOTE ADULT - ASSESSMENT
61M s/p back excision with wound vac placement on 6/2/2022    Plan:  - Wound vac ordered for home  - restarted eliquis POD3 (6/3/2022) for hx of PE/DVT  - Regular diet  - Discharge today pending wound vac/VNS    D Surgery  [id number]

## 2022-06-08 LAB
CULTURE RESULTS: SIGNIFICANT CHANGE UP
SPECIMEN SOURCE: SIGNIFICANT CHANGE UP

## 2022-06-15 PROBLEM — I26.99 OTHER PULMONARY EMBOLISM WITHOUT ACUTE COR PULMONALE: Chronic | Status: ACTIVE | Noted: 2022-06-01

## 2022-06-21 LAB — SURGICAL PATHOLOGY STUDY: SIGNIFICANT CHANGE UP

## 2022-06-22 ENCOUNTER — APPOINTMENT (OUTPATIENT)
Dept: SURGICAL ONCOLOGY | Facility: CLINIC | Age: 62
End: 2022-06-22

## 2022-06-28 NOTE — H&P PST ADULT - ATTENDING PHYSICIAN: I HAVE REVIEWED THE CLINICAL DOCUMENTATION AND AGREE WITH THE ABOVE NOTE
Subjective:       Patient ID: Peter Boss is a 30 y.o. female.    Chief Complaint: Annual Exam    Medication Refill  This is a chronic (Adderall) problem. The current episode started more than 1 year ago. The problem occurs daily. The problem has been unchanged. Pertinent negatives include no abdominal pain, arthralgias, chest pain, coughing, fatigue, fever, headaches, myalgias, rash, sore throat or vomiting. The treatment provided significant relief.     She is due for a screening lipid and glucose to try to obtain insurance    Review of Systems   Constitutional: Negative for fatigue, fever and unexpected weight change.   HENT: Negative for ear pain and sore throat.    Eyes: Negative for pain and visual disturbance.   Respiratory: Negative for cough and shortness of breath.    Cardiovascular: Negative for chest pain and palpitations.   Gastrointestinal: Negative for abdominal pain, diarrhea and vomiting.   Musculoskeletal: Negative for arthralgias and myalgias.   Skin: Negative for color change and rash.   Neurological: Negative for dizziness and headaches.   Psychiatric/Behavioral: Negative for dysphoric mood and sleep disturbance. The patient is not nervous/anxious.        Vitals:    06/28/22 0729   BP: 106/82   Pulse: 80   Temp: 98.1 °F (36.7 °C)       Objective:     Current Outpatient Medications   Medication Sig Dispense Refill    BIOTIN ORAL Take by mouth.      ibuprofen (ADVIL,MOTRIN) 800 MG tablet Take 1 tablet (800 mg total) by mouth 3 (three) times daily as needed for Pain. 90 tablet 1    multivitamin capsule Take 1 capsule by mouth once daily.      ondansetron (ZOFRAN-ODT) 4 MG TbDL Take 1 tablet (4 mg total) by mouth every 12 (twelve) hours. 40 tablet 2    [START ON 8/26/2022] dextroamphetamine-amphetamine (ADDERALL) 20 mg tablet Take 1 tablet by mouth 2 (two) times a day. 60 tablet 0    [START ON 7/27/2022] dextroamphetamine-amphetamine (ADDERALL) 20 mg tablet Take 1 tablet by mouth 2  (two) times a day. 60 tablet 0    dextroamphetamine-amphetamine (ADDERALL) 20 mg tablet Take 1 tablet by mouth 2 (two) times a day. 60 tablet 0    medroxyPROGESTERone (DEPO-PROVERA) 150 mg/mL injection       valACYclovir (VALTREX) 500 MG tablet Take 1 tablet (500 mg total) by mouth 2 (two) times daily. 30 tablet 0     No current facility-administered medications for this visit.       Physical Exam  Vitals and nursing note reviewed.   Constitutional:       General: She is not in acute distress.     Appearance: She is well-developed.   HENT:      Head: Normocephalic and atraumatic.   Eyes:      Pupils: Pupils are equal, round, and reactive to light.   Cardiovascular:      Rate and Rhythm: Normal rate and regular rhythm.   Pulmonary:      Effort: Pulmonary effort is normal.      Breath sounds: Normal breath sounds.   Musculoskeletal:         General: Normal range of motion.      Cervical back: Normal range of motion and neck supple.   Skin:     General: Skin is warm and dry.      Findings: No rash.   Neurological:      Mental Status: She is alert and oriented to person, place, and time.   Psychiatric:         Judgment: Judgment normal.         Assessment:       1. Attention deficit disorder (ADD) without hyperactivity    2. Screening, lipid    3. Screening for diabetes mellitus        Plan:   Attention deficit disorder (ADD) without hyperactivity  -     dextroamphetamine-amphetamine (ADDERALL) 20 mg tablet; Take 1 tablet by mouth 2 (two) times a day.  Dispense: 60 tablet; Refill: 0  -     dextroamphetamine-amphetamine (ADDERALL) 20 mg tablet; Take 1 tablet by mouth 2 (two) times a day.  Dispense: 60 tablet; Refill: 0  -     dextroamphetamine-amphetamine (ADDERALL) 20 mg tablet; Take 1 tablet by mouth 2 (two) times a day.  Dispense: 60 tablet; Refill: 0    Screening, lipid  -     Lipid Panel; Future; Expected date: 06/28/2022    Screening for diabetes mellitus  -     GLUCOSE, FASTING; Future; Expected date:  06/28/2022          No follow-ups on file.    There are no Patient Instructions on file for this visit.   Statement Selected

## 2022-06-29 ENCOUNTER — APPOINTMENT (OUTPATIENT)
Dept: SURGICAL ONCOLOGY | Facility: CLINIC | Age: 62
End: 2022-06-29

## 2022-06-29 VITALS
SYSTOLIC BLOOD PRESSURE: 149 MMHG | TEMPERATURE: 98.1 F | DIASTOLIC BLOOD PRESSURE: 94 MMHG | HEART RATE: 84 BPM | HEIGHT: 66 IN | RESPIRATION RATE: 17 BRPM | WEIGHT: 134 LBS | OXYGEN SATURATION: 99 % | BODY MASS INDEX: 21.53 KG/M2

## 2022-06-29 PROCEDURE — 99024 POSTOP FOLLOW-UP VISIT: CPT

## 2022-06-30 NOTE — PHYSICAL EXAM
[FreeTextEntry1] : COVID-19 precautions as per [facility] policy was universally followed\par  [de-identified] : right posterior back, incision healing well with granulation present, no evidence of infection. VAC removed and wet-to-dry dressing placed

## 2022-06-30 NOTE — ASSESSMENT
[FreeTextEntry1] : IMP:\par s/p ex lap, omentectomy, splenectomy, debulking right colectomy and creation of diverting ileostomy on 2/22/2019. 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  Benign pathology. \par s/p mediport placement 1/23/2020.  \par Chemotherapy under the care of Dr. Hernandez. \par \par Luan is now s/p a surgical excision of a sebaceous cyst to his right posterior back on 6/2/2022. Final pathology, follicular cyst, infundibular type, inflamed with associated dermal fibrosis. \par \par 6/29/22: Wound VAC removed today and replaced with wet-to-dry dressing. Wound bed healing well\par \par PLAN:\par - RTO 2 weeks:\par continue wet-to-dry dressings for 1 week, along with wound care nurse home visit/assessment, if no improvement replace VAC. \par If improving, continue wet-to-dry for another week, RTO in 2 weeks to reassess wound bed. Wound care to update my office on any issues.\par \par Continue Chemo and surveillance imaging as per Dr. Hernandez\par \par I have discussed the diagnosis, therapeutic plan and options with the patient at length. Patient expressed verbal understanding to proceed with the proposed plan. All questions answered.\par

## 2022-06-30 NOTE — CONSULT LETTER
[Dear  ___] : Dear  [unfilled], [FreeTextEntry2] : Randy Hernandez MD [FreeTextEntry3] : Rik Qureshi MD, FICS, FACS\par Director of Surgical Oncology- [facility] \par , Department of Surgery  \par [facility] [employer] [facility] [employer] [facility] \par [address]\[place] [address]\par \par [address]\[place] [address]\par \par [address]\[place] [address]\par \par (mob) [phone]\par (o) [phone]\par (f) [phone]\par

## 2022-06-30 NOTE — HISTORY OF PRESENT ILLNESS
[de-identified] :  Luan Silva is a 61 year old male presents for a post-op visit, s/p excision of sebaceous cyst to back on 6/2/2022.\par \par Initially consulted 12/12/18, referred by Dr. Todd [place]. \par The patient was seen by Dr. Escalona in November 2018 for mild discomfort to the RUQ for many years and significant GERD despite PPI treatment.  Patient is from [facility]. \par Abdominal ultrasound performed on 11/15/18 showed moderate amount of very complex ascites within the right and left abdomen causing mass effect and scalloping of the capsule of the liver. CT was then recommended. No evidence of cholecystitis. \par Upper GI series performed on 11/15/18 demonstrated a small hiatal hernia and severe GERD. 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 9mm nonspecific hyperdense focus in hepatic segment 4. \par \par PMH notable for HTN, HLD, total thyroidectomy for reportedly benign thyroid nodules 2013, DM2 (on Metformin), knee surgery 1996, gallstones since 1998. Social alcohol use.  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 \par Family history of malignancies involves his mother with ovarian cancer at age 72. His 1 sister underwent genetic testing and was found to be BRCA negative. He states that same sister also had a benign pancreatic tumor removed.  His other sister was diagnosed with breast cancer at age 38, s/p chemo and surgery and tested positive for BRCA gene. He denies any other family history of malignancies. \par \par He underwent a screening colonoscopy with Dr. Kate Irby on 1/3/19 where he was found to have a descending colon polyp, cecal polyp and a descending polyp with benign pathology. Cecal and terminal biopsy was also benign. \par \par PET/CT from 2/2019 showed findings suggestive of mucocele of appendix with moderate abdominal/pelvic ascites and extensive minimally FDG-avid infiltration of omentum and peritoneal cavity compatible with pseudomyxoma peritonei, small hypermetabolic focus in the right lower cervical/supraclavicular region corresponding to a lymph node \par s/p ex lap, omentectomy, splenectomy, debulking right colectomy and creation of diverting ileostomy on 2/22/2019. 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 Hernandez on 4/10/19. \par \par Xray Barium Enema performed on 4/11/19 with unremarkable findings, specifically no evidence of a leak. \par s/p reversal of loop ileostomy on 4/25/19 with benign pathology. \par \par He continues to have routine imaging & chemotherapy under c/o Dr. Hernandez. \par \par Today 5/18/22: Patient states has notice lipoma in mid back, which has been there for many years however has gone more painful and started oozing. Patient states abdominal pain controlled with pain medication. Patient continue chemotherapy with Dr. Hernandez and is tolerating well\par Luan is now s/p a surgical excision of a sebaceous cyst to his right posterior back on 6/2/2022. Final pathology, follicular cyst, infundibular type, inflamed with associated dermal fibrosis\par

## 2022-07-02 LAB
CULTURE RESULTS: SIGNIFICANT CHANGE UP
SPECIMEN SOURCE: SIGNIFICANT CHANGE UP

## 2022-07-13 ENCOUNTER — APPOINTMENT (OUTPATIENT)
Dept: SURGICAL ONCOLOGY | Facility: CLINIC | Age: 62
End: 2022-07-13

## 2022-07-13 VITALS
WEIGHT: 134 LBS | OXYGEN SATURATION: 97 % | HEIGHT: 66 IN | DIASTOLIC BLOOD PRESSURE: 88 MMHG | SYSTOLIC BLOOD PRESSURE: 143 MMHG | HEART RATE: 75 BPM | BODY MASS INDEX: 21.53 KG/M2

## 2022-07-13 PROCEDURE — 99213 OFFICE O/P EST LOW 20 MIN: CPT | Mod: 24

## 2022-07-24 NOTE — ASSESSMENT
[FreeTextEntry1] : IMP:\par s/p ex lap, omentectomy, splenectomy, debulking right colectomy and creation of diverting ileostomy on 2/22/2019. 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  Benign pathology. \par s/p mediport placement 1/23/2020.  \par Chemotherapy under the care of Dr. Hernandez. \par \par Luan is now s/p a surgical excision of a sebaceous cyst to his right posterior back on 6/2/2022. Final pathology, follicular cyst, infundibular type, inflamed with associated dermal fibrosis. \par \par 6/29/22: Wound VAC removed today and replaced with wet-to-dry dressing. Wound bed healing well\par \par 7/13/22 Luan returns today for a post-op visit , wound is slowly healing with no evidence of infection. Continue wet to dry dressings\par \par PLAN:\par - RTO 3 weeks:\par continue wet-to-dry dressings \par Continue Chemo and surveillance imaging as per Dr. Hernandez\par \par I have discussed the diagnosis, therapeutic plan and options with the patient at length. Patient expressed verbal understanding to proceed with the proposed plan. All questions answered.\par

## 2022-07-24 NOTE — CONSULT LETTER
[Dear  ___] : Dear  [unfilled], [Courtesy Letter:] : I had the pleasure of seeing your patient, [unfilled], in my office today. [Please see my note below.] : Please see my note below. [Consult Closing:] : Thank you very much for allowing me to participate in the care of this patient.  If you have any questions, please do not hesitate to contact me. [Sincerely,] : Sincerely, [FreeTextEntry2] : Randy Hernandez MD [FreeTextEntry3] : Rik Qureshi MD, FICS, FACS\par Director of Surgical Oncology- [facility] \par , Department of Surgery  \par [facility] [employer] [facility] [employer] at [facility] \par [address]\[place] [address]\par \par [address]\[place] [address]\par \par [address]\[place] [address]\par \par (mob) [phone]\par (o) [phone]\par (f) [phone]\par

## 2022-07-24 NOTE — HISTORY OF PRESENT ILLNESS
[de-identified] :  Luan Silva is a 61 year old male presents for a post-op visit, s/p excision of sebaceous cyst to back on 6/2/2022.\par \par Initially consulted 12/12/18, referred by Dr. Todd Escalona. \par The patient was seen by Dr. Escalona in November 2018 for mild discomfort to the RUQ for many years and significant GERD despite PPI treatment.  Patient is from [facility]. \par Abdominal ultrasound performed on 11/15/18 showed moderate amount of very complex ascites within the right and left abdomen causing mass effect and scalloping of the capsule of the liver. CT was then recommended. No evidence of cholecystitis. \par Upper GI series performed on 11/15/18 demonstrated a small hiatal hernia and severe GERD. 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 9mm nonspecific hyperdense focus in hepatic segment 4. \par \par PMH notable for HTN, HLD, total thyroidectomy for reportedly benign thyroid nodules 2013, DM2 (on Metformin), knee surgery 1996, gallstones since 1998. Social alcohol use.  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 \par Family history of malignancies involves his mother with ovarian cancer at age 72. His 1 sister underwent genetic testing and was found to be BRCA negative. He states that same sister also had a benign pancreatic tumor removed.  His other sister was diagnosed with breast cancer at age 38, s/p chemo and surgery and tested positive for BRCA gene. He denies any other family history of malignancies. \par \par He underwent a screening colonoscopy with Dr. Kate Irby on 1/3/19 where he was found to have a descending colon polyp, cecal polyp and a descending polyp with benign pathology. Cecal and terminal biopsy was also benign. \par \par PET/CT from 2/2019 showed findings suggestive of mucocele of appendix with moderate abdominal/pelvic ascites and extensive minimally FDG-avid infiltration of omentum and peritoneal cavity compatible with pseudomyxoma peritonei, small hypermetabolic focus in the right lower cervical/supraclavicular region corresponding to a lymph node \par s/p ex lap, omentectomy, splenectomy, debulking right colectomy and creation of diverting ileostomy on 2/22/2019. 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 Hernandez on 4/10/19. \par \par Xray Barium Enema performed on 4/11/19 with unremarkable findings, specifically no evidence of a leak. \par s/p reversal of loop ileostomy on 4/25/19 with benign pathology. \par \par He continues to have routine imaging & chemotherapy under c/o Dr. Hernandez. \par \par Today 5/18/22: Patient states has notice lipoma in mid back, which has been there for many years however has gone more painful and started oozing. Patient states abdominal pain controlled with pain medication. Patient continue chemotherapy with Dr. Hernandez and is tolerating well\par Luan is now s/p a surgical excision of a sebaceous cyst to his right posterior back on 6/2/2022. Final pathology, follicular cyst, infundibular type, inflamed with associated dermal fibrosis\par

## 2022-07-24 NOTE — PHYSICAL EXAM
[Normal] : well developed, well nourished, in no acute distress [FreeTextEntry1] : COVID-19 precautions as per [facility] policy was universally followed\par  [de-identified] : right posterior back, incision healing well with granulation present, no evidence of infection. VAC removed and wet-to-dry dressing placed

## 2022-07-25 NOTE — CONSULT LETTER
[Courtesy Letter:] : I had the pleasure of seeing your patient, [unfilled], in my office today. [Please see my note below.] : Please see my note below. [Consult Closing:] : Thank you very much for allowing me to participate in the care of this patient.  If you have any questions, please do not hesitate to contact me. [Sincerely,] : Sincerely, [FreeTextEntry2] : Hernandez  [FreeTextEntry3] : Rik Qureshi MD, FICS, FACS\par Director of Surgical Oncology- [facility] \par , Department of Surgery  \par [facility] [employer] [facility] [employer] at [facility] \par [address]\[place] [address]\par \par [address]\[place] [address]\par \par [address]\[place] [address]\par \par (mob) [phone]\par (o) [phone]\par (f) [phone]\par

## 2022-07-25 NOTE — ASSESSMENT
[FreeTextEntry1] : IMP:\par s/p exploratory laparotomy, evacuation of 3 L of mucinous ascites, resection of intraabdominal masses, omentectomy, extended right colectomy, splenectomy, intraabdominal debulking of intraabdominal tumor, primary ileocolic anastomosis, creation of diverting loop ileostomy, right ureterolysis and splenic flexure takedown for a pseudomyxoma peritonei and appendiceal mucocele on 2/22/19.  \par \par Final pathology:\par 1) Falciform ligament excision: Metastatic low grade mucinous adenocarcinoma (M1)\par 2) Peritoneum, omentum, resection: Metastatic low grade mucinous adenocarcinoma (M1)\par 3) Spleen: Perisplenic soft tissue with metastatic low grade mucinous adenocarcinoma, splenic parenchyma is uninvolved. 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 \par TUMOR STAGE: [id number]\par \par s/p reversal of loop ileostomy on 4/25/19.  Benign pathology. \par s/p mediport placement 1/23/2020.  \par Chemotherapy under the care of Dr. Hernandez. \par \par Patient with right mid back wound. Today wound cleanse and packed. Patient will be setup with a visiting nurse for wound care dressing. \par \par PLAN:\par 1) Chemo and surveillance imaging as per \par 2) RTO 3weeks

## 2022-07-25 NOTE — PHYSICAL EXAM
[Normal] : supple, no neck mass and thyroid not enlarged [Normal Neck Lymph Nodes] : normal neck lymph nodes  [Normal Supraclavicular Lymph Nodes] : normal supraclavicular lymph nodes [Normal Groin Lymph Nodes] : normal groin lymph nodes [Normal Axillary Lymph Nodes] : normal axillary lymph nodes [Normal] : oriented to person, place and time, with appropriate affect [FreeTextEntry1] : COVID-19 precautions as per [facility] policy was universally followed\par  [de-identified] : right mid back sebaceous cyst, tender to touch, open and purulent drainage

## 2022-07-28 PROBLEM — L72.3 SEBACEOUS CYST: Status: ACTIVE | Noted: 2022-05-18

## 2022-08-03 ENCOUNTER — APPOINTMENT (OUTPATIENT)
Dept: SURGICAL ONCOLOGY | Facility: CLINIC | Age: 62
End: 2022-08-03

## 2022-08-03 VITALS
WEIGHT: 139 LBS | HEART RATE: 67 BPM | BODY MASS INDEX: 22.34 KG/M2 | HEIGHT: 66 IN | SYSTOLIC BLOOD PRESSURE: 138 MMHG | DIASTOLIC BLOOD PRESSURE: 79 MMHG

## 2022-08-03 VITALS — TEMPERATURE: 98.1 F

## 2022-08-03 DIAGNOSIS — L72.3 SEBACEOUS CYST: ICD-10-CM

## 2022-08-03 PROCEDURE — 99214 OFFICE O/P EST MOD 30 MIN: CPT | Mod: 24

## 2022-08-08 NOTE — PHYSICAL EXAM
[Normal] : well developed, well nourished, in no acute distress [FreeTextEntry1] : COVID-19 precautions as per [facility] policy was universally followed\par  [de-identified] : right posterior back, incision healing well with granulation present, no evidence of infection.

## 2022-08-08 NOTE — HISTORY OF PRESENT ILLNESS
[de-identified] :  Luan Silva is a 61 year old male presents for a post-op visit, s/p excision of sebaceous cyst to back on 6/2/2022.\par \par Initially consulted 12/12/18, referred by Dr. Todd Escalona. \par The patient was seen by Dr. Escalona in November 2018 for mild discomfort to the RUQ for many years and significant GERD despite PPI treatment.  Patient is from [facility]. \par Abdominal ultrasound performed on 11/15/18 showed moderate amount of very complex ascites within the right and left abdomen causing mass effect and scalloping of the capsule of the liver. CT was then recommended. No evidence of cholecystitis. \par Upper GI series performed on 11/15/18 demonstrated a small hiatal hernia and severe GERD. 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 9 mm nonspecific hyperdense focus in hepatic segment 4. \par \par PMH notable for HTN, HLD, total thyroidectomy for reportedly benign thyroid nodules 2013, DM2 (on Metformin), knee surgery 1996, gallstones since 1998. Social alcohol use.  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 \par Family history of malignancies involves his mother with ovarian cancer at age 72. His 1 sister underwent genetic testing and was found to be BRCA negative. He states that same sister also had a benign pancreatic tumor removed.  His other sister was diagnosed with breast cancer at age 38, s/p chemo and surgery and tested positive for BRCA gene. He denies any other family history of malignancies. \par \par He underwent a screening colonoscopy with Dr. Kate Irby on 1/3/19 where he was found to have a descending colon polyp, cecal polyp and a descending polyp with benign pathology. Cecal and terminal biopsy was also benign. \par \par PET/CT from 2/2019 showed findings suggestive of mucocele of appendix with moderate abdominal/pelvic ascites and extensive minimally FDG-avid infiltration of omentum and peritoneal cavity compatible with pseudomyxoma peritonei, small hypermetabolic focus in the right lower cervical/supraclavicular region corresponding to a lymph node \par s/p ex lap, omentectomy, splenectomy, debulking right colectomy and creation of diverting ileostomy on 2/22/2019. 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 Hernandez on 4/10/19. \par \par Xray Barium Enema performed on 4/11/19 with unremarkable findings, specifically no evidence of a leak. \par s/p reversal of loop ileostomy on 4/25/19 with benign pathology. \par \par He continues to have routine imaging & chemotherapy under c/o Dr. Hernandez. \par \par 5/18/22: Patient states has notice lipoma in mid back, which has been there for many years however has gone more painful and started oozing. Patient states abdominal pain controlled with pain medication. Patient continue chemotherapy with Dr. Hernandez and is tolerating well\par Luan is now s/p a surgical excision of a sebaceous cyst to his right posterior back on 6/2/2022. Final pathology, follicular cyst, infundibular type, inflamed with associated dermal fibrosis\par

## 2022-08-08 NOTE — ASSESSMENT
[FreeTextEntry1] : IMP:\par s/p ex lap, omentectomy, splenectomy, debulking right colectomy and creation of diverting ileostomy on 2/22/2019. 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  Benign pathology. \par s/p mediport placement 1/23/2020.  \par Chemotherapy under the care of Dr. Hernandez. \par \par Luan is now s/p a surgical excision of a sebaceous cyst to his right posterior back on 6/2/2022. Final pathology, follicular cyst, infundibular type, inflamed with associated dermal fibrosis. \par \par \par PLAN:\par RTO 6 months\par Continue Chemo and surveillance imaging as per Dr. Hernandez\par \par I have discussed the diagnosis, therapeutic plan and options with the patient at length. Patient expressed verbal understanding to proceed with the proposed plan. All questions answered.\par

## 2022-08-08 NOTE — CONSULT LETTER
[Dear  ___] : Dear  [unfilled], [Courtesy Letter:] : I had the pleasure of seeing your patient, [unfilled], in my office today. [Please see my note below.] : Please see my note below. [Consult Closing:] : Thank you very much for allowing me to participate in the care of this patient.  If you have any questions, please do not hesitate to contact me. [Sincerely,] : Sincerely, [FreeTextEntry2] : Randy Heranndez MD [FreeTextEntry3] : Rik Qureshi MD, FICS, FACS\par Director of Surgical Oncology- [facility] \par , Department of Surgery  \par [facility] [employer] [facility] [employer] at [facility] \par [address]\[place] [address]\par \par [address]\[place] [address]\par \par [address]\[place] [address]\par \par (mob) [phone]\par (o) [phone]\par (f) [phone]\par

## 2023-02-01 PROBLEM — C18.1 MUCINOUS ADENOCARCINOMA OF APPENDIX: Status: ACTIVE | Noted: 2019-03-13

## 2023-02-01 NOTE — ASSESSMENT
[FreeTextEntry1] : IMP:\par s/p ex lap, omentectomy, splenectomy, debulking right colectomy and creation of diverting ileostomy on 2/22/2019. 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  Benign pathology. \par s/p mediport placement 1/23/2020.  \par Chemotherapy under the care of Dr. Hernandez. \par \par Luan is now s/p a surgical excision of a sebaceous cyst to his right posterior back on 6/2/2022. Final pathology, follicular cyst - wound healing was delayed r/t Avastin \par \par PLAN:\par RTO 6 months\par Continue Chemo and surveillance imaging as per Dr. Hernandez\par \par I have discussed the diagnosis, therapeutic plan and options with the patient at length. Patient expressed verbal understanding to proceed with the proposed plan. All questions answered.\par

## 2023-02-01 NOTE — CONSULT LETTER
[Dear  ___] : Dear  [unfilled], [Courtesy Letter:] : I had the pleasure of seeing your patient, [unfilled], in my office today. [Please see my note below.] : Please see my note below. [Consult Closing:] : Thank you very much for allowing me to participate in the care of this patient.  If you have any questions, please do not hesitate to contact me. [Sincerely,] : Sincerely, [FreeTextEntry2] : Randy Hernandez MD [FreeTextEntry3] : Rik Qureshi MD, FICS, FACS\par Director of Surgical Oncology- [facility] \par , Department of Surgery  \par [facility] [employer] [facility] [employer] [facility] at [facility] \par [address]\[place] [address]\par \par [address]\[place] [address]\par \par [address]\[place] [address]\par \par (mob) [phone]\par (o) [phone]\par (f) [phone]\par   [DrHannah  ___] : Dr. HELMS

## 2023-02-01 NOTE — PHYSICAL EXAM
[FreeTextEntry1] : COVID-19 precautions as per [facility] policy was universally followed\par  [Normal] : supple, no neck mass and thyroid not enlarged [Normal Neck Lymph Nodes] : normal neck lymph nodes  [Normal Supraclavicular Lymph Nodes] : normal supraclavicular lymph nodes [Normal Groin Lymph Nodes] : normal groin lymph nodes [Normal Axillary Lymph Nodes] : normal axillary lymph nodes [Normal] : oriented to person, place and time, with appropriate affect

## 2023-02-01 NOTE — HISTORY OF PRESENT ILLNESS
[de-identified] : Mr. LUAN WILLOUGHBY is a 62 year old man here today for a follow-up visit \par \par Initially consulted 12/12/18, referred by Dr. Todd Escalona. \par The patient was seen by Dr. Escalona in November 2018 for mild discomfort to the RUQ for many years and significant GERD despite PPI treatment.  Patient is from [facility]. \par Abdominal ultrasound performed on 11/15/18 showed moderate amount of very complex ascites within the right and left abdomen causing mass effect and scalloping of the capsule of the liver. CT was then recommended. No evidence of cholecystitis. \par Upper GI series performed on 11/15/18 demonstrated a small hiatal hernia and severe GERD. 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 9 mm nonspecific hyperdense focus in hepatic segment 4. \par \par PMH notable for HTN, HLD, total thyroidectomy for reportedly benign thyroid nodules 2013, DM2 (on Metformin), knee surgery 1996, gallstones since 1998. Social alcohol use.  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 \par Family history of malignancies involves his mother with ovarian cancer at age 72. His 1 sister underwent genetic testing and was found to be BRCA negative. He states that same sister also had a benign pancreatic tumor removed.  His other sister was diagnosed with breast cancer at age 38, s/p chemo and surgery and tested positive for BRCA gene. He denies any other family history of malignancies. \par \par He underwent a screening colonoscopy with Dr. Kate Irby on 1/3/19 where he was found to have a descending colon polyp, cecal polyp and a descending polyp with benign pathology. Cecal and terminal biopsy was also benign. \par \par PET/CT from 2/2019 showed findings suggestive of mucocele of appendix with moderate abdominal/pelvic ascites and extensive minimally FDG-avid infiltration of omentum and peritoneal cavity compatible with pseudomyxoma peritonei, small hypermetabolic focus in the right lower cervical/supraclavicular region corresponding to a lymph node \par \par **SURGERY**\par s/p ex lap, omentectomy, splenectomy, debulking right colectomy and creation of diverting ileostomy on 2/22/2019. 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 Hernandez on 4/10/19. \par \par Xray Barium Enema performed on 4/11/19 with unremarkable findings, specifically no evidence of a leak. \par s/p reversal of loop ileostomy on 4/25/19 with benign pathology. \par \par He continues to have routine imaging & chemotherapy under c/o Dr. Hernandez. \par \par 5/18/22: Patient states has notice lipoma in mid back, which has been there for many years however has gone more painful and started oozing. Patient states abdominal pain controlled with pain medication. Patient continue chemotherapy with Dr. Hernandez and is tolerating well\par Luan is now s/p a surgical excision of a sebaceous cyst to his right posterior back on 6/2/2022. Final pathology, follicular cyst, infundibular type, inflamed with associated dermal fibrosis\par - post-op complicated with delayed wound healing, most likely attributed to Avastin, needing packing/wound VAC

## 2023-02-08 ENCOUNTER — APPOINTMENT (OUTPATIENT)
Dept: SURGICAL ONCOLOGY | Facility: CLINIC | Age: 63
End: 2023-02-08

## 2023-02-08 DIAGNOSIS — C18.1 MALIGNANT NEOPLASM OF APPENDIX: ICD-10-CM

## 2023-04-06 ENCOUNTER — APPOINTMENT (OUTPATIENT)
Dept: CT IMAGING | Facility: HOSPITAL | Age: 63
End: 2023-04-06
Payer: MEDICARE

## 2023-04-06 ENCOUNTER — OUTPATIENT (OUTPATIENT)
Dept: OUTPATIENT SERVICES | Facility: HOSPITAL | Age: 63
LOS: 1 days | End: 2023-04-06
Payer: MEDICARE

## 2023-04-06 DIAGNOSIS — Z98.890 OTHER SPECIFIED POSTPROCEDURAL STATES: Chronic | ICD-10-CM

## 2023-04-06 DIAGNOSIS — D64.9 ANEMIA, UNSPECIFIED: ICD-10-CM

## 2023-04-06 DIAGNOSIS — C18.1 MALIGNANT NEOPLASM OF APPENDIX: ICD-10-CM

## 2023-04-06 DIAGNOSIS — R63.0 ANOREXIA: ICD-10-CM

## 2023-04-06 DIAGNOSIS — D50.9 IRON DEFICIENCY ANEMIA, UNSPECIFIED: ICD-10-CM

## 2023-04-06 DIAGNOSIS — G47.9 SLEEP DISORDER, UNSPECIFIED: ICD-10-CM

## 2023-04-06 PROCEDURE — 74177 CT ABD & PELVIS W/CONTRAST: CPT | Mod: 26,MH

## 2023-04-06 PROCEDURE — 71260 CT THORAX DX C+: CPT | Mod: MH

## 2023-04-06 PROCEDURE — 74177 CT ABD & PELVIS W/CONTRAST: CPT | Mod: MH

## 2023-04-06 PROCEDURE — 71260 CT THORAX DX C+: CPT | Mod: 26,MH

## 2023-05-05 ENCOUNTER — APPOINTMENT (OUTPATIENT)
Dept: RADIATION ONCOLOGY | Facility: CLINIC | Age: 63
End: 2023-05-05
Payer: MEDICARE

## 2023-05-05 ENCOUNTER — NON-APPOINTMENT (OUTPATIENT)
Age: 63
End: 2023-05-05

## 2023-05-05 VITALS — BODY MASS INDEX: 21.38 KG/M2 | RESPIRATION RATE: 18 BRPM | WEIGHT: 133 LBS | HEIGHT: 66 IN

## 2023-05-05 DIAGNOSIS — R91.8 OTHER NONSPECIFIC ABNORMAL FINDING OF LUNG FIELD: ICD-10-CM

## 2023-05-05 PROCEDURE — 99204 OFFICE O/P NEW MOD 45 MIN: CPT

## 2023-05-05 NOTE — VITALS
[Maximal Pain Intensity: 3/10] : 3/10 [Least Pain Intensity: 0/10] : 0/10 [80: Normal activity with effort; some signs or symptoms of disease.] : 80: Normal activity with effort; some signs or symptoms of disease.  [ECOG Performance Status: 1 - Restricted in physically strenuous activity but ambulatory and able to carry out work of a light or sedentary nature] : Performance Status: 1 - Restricted in physically strenuous activity but ambulatory and able to carry out work of a light or sedentary nature, e.g., light house work, office work

## 2023-05-08 NOTE — HISTORY OF PRESENT ILLNESS
[FreeTextEntry1] : 61 y/o male with h/o appendix surgery for adenocarcinoma, chemotherapy for appendix adenocarcinoma ([id number]), HLD, HTN, PE, thyroid surgery for benign nodule presents to discuss radiation options for metastatic appendiceal adenocarcinoma. \par \par 2019 -- surgery and chemo for appendiceal adenocarcinoma.\par 1/2019 -- pathology showed Final Diagnosis\par 1. Descending colon, polyp 1, Endoscopic biopsy: - Tubular adenoma\par 2. Cecum, polyp, endoscopic biopsy: - Hyperplastic polyp\par 3. Cecum, endoscopic biopsy: - Cecal mucosa without significant histologic findings\par 4. Terminal ileum, endoscopic biopsy: - Terminal ileal mucosa with preserved villous architecture and no significant intraepithelial lymphocytosis\par 5. Descending colon, polyp 2, endoscopic biopsy: - Tubular adenoma\par \par 2/2019 -- pathology showed Final Diagnosis\par 1-Falciform ligament, excision: - Metastatic low grade mucinous adenocarcinoma (M1).\par 2-Peritoneum, omentum, resection: - Metastatic low grade mucinous adenocarcinoma (M1).\par 3-Spleen, resection: - Perisplenic soft tissue with metastatic low grade mucinous adenocarcinoma (M1). - Splenic parenchyma is uninvolved.\par 4-Right colon with lymph node, resection:- Low- and focal high-grade mucinous adenocarcinoma, arising in a low-grade appendiceal mucinous neoplasm (LAMN), 9 x 5.5 x4.5 cm. - Tumor is perforated (T4a). - Sixteen lymph nodes are negative for metastatic adenocarcinoma. - Tumor deposits identified (N1c). - Lymphovascular and perineural invasion is not identified. - Resection margins are free of tumor. - Pathologic staging (AJCC 8th edition): [id number].\par \par 4/2023 -- CT scan showed RLL lung mass\par \par 5/5/2023 -- Consult. on oxycontin for abd pain at times. No SOB, chest pain, headache. Pt refused biopsy to RLL lung mass. \par

## 2023-05-08 NOTE — REVIEW OF SYSTEMS
[FreeTextEntry5] : HTN, HLD [FreeTextEntry6] : h/o PE [FreeTextEntry7] : h/o chemo and surgery for appendix adenocarcinoma [de-identified] : h/o thyroid surgery

## 2023-05-08 NOTE — PHYSICAL EXAM
[Normal] : oriented to person, place and time, the affect was normal, the mood was normal and not anxious [de-identified] : worse BS right lung

## 2023-05-09 ENCOUNTER — OUTPATIENT (OUTPATIENT)
Dept: OUTPATIENT SERVICES | Facility: HOSPITAL | Age: 63
LOS: 1 days | Discharge: HOME | End: 2023-05-09
Payer: MEDICARE

## 2023-05-09 DIAGNOSIS — Z98.890 OTHER SPECIFIED POSTPROCEDURAL STATES: Chronic | ICD-10-CM

## 2023-05-09 PROCEDURE — 77334 RADIATION TREATMENT AID(S): CPT | Mod: 26

## 2023-05-09 PROCEDURE — 77290 THER RAD SIMULAJ FIELD CPLX: CPT | Mod: 26

## 2023-05-11 DIAGNOSIS — C34.91 MALIGNANT NEOPLASM OF UNSPECIFIED PART OF RIGHT BRONCHUS OR LUNG: ICD-10-CM

## 2023-06-01 ENCOUNTER — NON-APPOINTMENT (OUTPATIENT)
Age: 63
End: 2023-06-01

## 2023-06-06 NOTE — HISTORY OF PRESENT ILLNESS
[FreeTextEntry1] : 63 y/o male with h/o appendix surgery for adenocarcinoma, chemotherapy for appendix adenocarcinoma ([id number]), HLD, HTN, PE, thyroid surgery for benign nodule presents to discuss radiation options for metastatic appendiceal adenocarcinoma. \par \par 2019 -- surgery and chemo for appendiceal adenocarcinoma.\par 1/2019 -- pathology showed Final Diagnosis\par 1. Descending colon, polyp 1, Endoscopic biopsy: - Tubular adenoma\par 2. Cecum, polyp, endoscopic biopsy: - Hyperplastic polyp\par 3. Cecum, endoscopic biopsy: - Cecal mucosa without significant histologic findings\par 4. Terminal ileum, endoscopic biopsy: - Terminal ileal mucosa with preserved villous architecture and no significant intraepithelial lymphocytosis\par 5. Descending colon, polyp 2, endoscopic biopsy: - Tubular adenoma\par \par 2/2019 -- pathology showed Final Diagnosis\par 1-Falciform ligament, excision: - Metastatic low grade mucinous adenocarcinoma (M1).\par 2-Peritoneum, omentum, resection: - Metastatic low grade mucinous adenocarcinoma (M1).\par 3-Spleen, resection: - Perisplenic soft tissue with metastatic low grade mucinous adenocarcinoma (M1). - Splenic parenchyma is uninvolved.\par 4-Right colon with lymph node, resection:- Low- and focal high-grade mucinous adenocarcinoma, arising in a low-grade appendiceal mucinous neoplasm (LAMN), 9 x 5.5 x4.5 cm. - Tumor is perforated (T4a). - Sixteen lymph nodes are negative for metastatic adenocarcinoma. - Tumor deposits identified (N1c). - Lymphovascular and perineural invasion is not identified. - Resection margins are free of tumor. - Pathologic staging (AJCC 8th edition): [id number].\par \par 4/2023 -- CT scan showed RLL lung mass\par \par 5/5/2023 -- Consult. on oxycontin for abd pain at times. No SOB, chest pain, headache. Pt refused biopsy to RLL lung mass. \par \par 6/1/23 OTV. 2/5 fractions of radiation for lung mass completed. No cough, chest pain, headache. \par

## 2023-06-06 NOTE — REVIEW OF SYSTEMS
[Dysphagia: Grade 0] : Dysphagia: Grade 0 [Cough: Grade 0] : Cough: Grade 0 [Alopecia: Grade 0] : Alopecia: Grade 0 [Pruritus: Grade 0] : Pruritus: Grade 0 [Skin Atrophy: Grade 0] : Skin Atrophy: Grade 0 [Skin Hyperpigmentation: Grade 0] : Skin Hyperpigmentation: Grade 0 [Skin Induration: Grade 0] : Skin Induration: Grade 0 [Dermatitis Radiation: Grade 0] : Dermatitis Radiation: Grade 0 [Negative] : Allergic/Immunologic [FreeTextEntry5] : HTN, HLD [FreeTextEntry6] : h/o PE [de-identified] : h/o thyroid surgery [FreeTextEntry7] : h/o chemo and surgery for appendix adenocarcinoma

## 2023-06-06 NOTE — DISEASE MANAGEMENT
[Pathological] : TNM Stage: p [IV] : IV [TTNM] : 4a [NTNM] : 1c [MTNM] : 1b [de-identified] : 50 Gy [de-identified] : right lung mass

## 2023-06-08 ENCOUNTER — EMERGENCY (EMERGENCY)
Facility: HOSPITAL | Age: 63
LOS: 1 days | Discharge: HOME | End: 2023-06-08
Attending: EMERGENCY MEDICINE
Payer: MEDICARE

## 2023-06-08 VITALS
RESPIRATION RATE: 18 BRPM | HEART RATE: 80 BPM | SYSTOLIC BLOOD PRESSURE: 157 MMHG | TEMPERATURE: 98 F | OXYGEN SATURATION: 96 % | DIASTOLIC BLOOD PRESSURE: 83 MMHG

## 2023-06-08 VITALS
DIASTOLIC BLOOD PRESSURE: 106 MMHG | SYSTOLIC BLOOD PRESSURE: 166 MMHG | OXYGEN SATURATION: 97 % | RESPIRATION RATE: 16 BRPM | TEMPERATURE: 98 F | HEART RATE: 79 BPM | WEIGHT: 130.95 LBS

## 2023-06-08 DIAGNOSIS — Z98.890 OTHER SPECIFIED POSTPROCEDURAL STATES: Chronic | ICD-10-CM

## 2023-06-08 LAB
ALBUMIN SERPL ELPH-MCNC: 2.9 G/DL — LOW (ref 3.5–5)
ALP SERPL-CCNC: 111 U/L — SIGNIFICANT CHANGE UP (ref 40–120)
ALT FLD-CCNC: 11 U/L DA — SIGNIFICANT CHANGE UP (ref 10–60)
ANION GAP SERPL CALC-SCNC: 5 MMOL/L — SIGNIFICANT CHANGE UP (ref 5–17)
APTT BLD: 33.1 SEC — SIGNIFICANT CHANGE UP (ref 27.5–35.5)
AST SERPL-CCNC: 16 U/L — SIGNIFICANT CHANGE UP (ref 10–40)
BASOPHILS # BLD AUTO: 0 K/UL — SIGNIFICANT CHANGE UP (ref 0–0.2)
BASOPHILS NFR BLD AUTO: 0 % — SIGNIFICANT CHANGE UP (ref 0–2)
BILIRUB SERPL-MCNC: 0.6 MG/DL — SIGNIFICANT CHANGE UP (ref 0.2–1.2)
BUN SERPL-MCNC: 9 MG/DL — SIGNIFICANT CHANGE UP (ref 7–18)
CALCIUM SERPL-MCNC: 8.8 MG/DL — SIGNIFICANT CHANGE UP (ref 8.4–10.5)
CHLORIDE SERPL-SCNC: 105 MMOL/L — SIGNIFICANT CHANGE UP (ref 96–108)
CO2 SERPL-SCNC: 26 MMOL/L — SIGNIFICANT CHANGE UP (ref 22–31)
CREAT SERPL-MCNC: 0.6 MG/DL — SIGNIFICANT CHANGE UP (ref 0.5–1.3)
EGFR: 108 ML/MIN/1.73M2 — SIGNIFICANT CHANGE UP
EOSINOPHIL # BLD AUTO: 0.01 K/UL — SIGNIFICANT CHANGE UP (ref 0–0.5)
EOSINOPHIL NFR BLD AUTO: 0.3 % — SIGNIFICANT CHANGE UP (ref 0–6)
GLUCOSE SERPL-MCNC: 155 MG/DL — HIGH (ref 70–99)
HCT VFR BLD CALC: 29.5 % — LOW (ref 39–50)
HGB BLD-MCNC: 10 G/DL — LOW (ref 13–17)
IMM GRANULOCYTES NFR BLD AUTO: 0.7 % — SIGNIFICANT CHANGE UP (ref 0–0.9)
INR BLD: 1.42 RATIO — HIGH (ref 0.88–1.16)
LIDOCAIN IGE QN: 32 U/L — LOW (ref 73–393)
LYMPHOCYTES # BLD AUTO: 0.82 K/UL — LOW (ref 1–3.3)
LYMPHOCYTES # BLD AUTO: 27.5 % — SIGNIFICANT CHANGE UP (ref 13–44)
MCHC RBC-ENTMCNC: 33.9 GM/DL — SIGNIFICANT CHANGE UP (ref 32–36)
MCHC RBC-ENTMCNC: 34.6 PG — HIGH (ref 27–34)
MCV RBC AUTO: 102.1 FL — HIGH (ref 80–100)
MONOCYTES # BLD AUTO: 0.25 K/UL — SIGNIFICANT CHANGE UP (ref 0–0.9)
MONOCYTES NFR BLD AUTO: 8.4 % — SIGNIFICANT CHANGE UP (ref 2–14)
NEUTROPHILS # BLD AUTO: 1.88 K/UL — SIGNIFICANT CHANGE UP (ref 1.8–7.4)
NEUTROPHILS NFR BLD AUTO: 63.1 % — SIGNIFICANT CHANGE UP (ref 43–77)
NRBC # BLD: 7 /100 WBCS — HIGH (ref 0–0)
PLATELET # BLD AUTO: 120 K/UL — LOW (ref 150–400)
POTASSIUM SERPL-MCNC: 3.6 MMOL/L — SIGNIFICANT CHANGE UP (ref 3.5–5.3)
POTASSIUM SERPL-SCNC: 3.6 MMOL/L — SIGNIFICANT CHANGE UP (ref 3.5–5.3)
PROT SERPL-MCNC: 8.5 G/DL — HIGH (ref 6–8.3)
PROTHROM AB SERPL-ACNC: 16.9 SEC — HIGH (ref 10.5–13.4)
RBC # BLD: 2.89 M/UL — LOW (ref 4.2–5.8)
RBC # FLD: 17.5 % — HIGH (ref 10.3–14.5)
SODIUM SERPL-SCNC: 136 MMOL/L — SIGNIFICANT CHANGE UP (ref 135–145)
WBC # BLD: 2.98 K/UL — LOW (ref 3.8–10.5)
WBC # FLD AUTO: 2.98 K/UL — LOW (ref 3.8–10.5)

## 2023-06-08 PROCEDURE — 99285 EMERGENCY DEPT VISIT HI MDM: CPT

## 2023-06-08 PROCEDURE — 85025 COMPLETE CBC W/AUTO DIFF WBC: CPT

## 2023-06-08 PROCEDURE — 83690 ASSAY OF LIPASE: CPT

## 2023-06-08 PROCEDURE — 86901 BLOOD TYPING SEROLOGIC RH(D): CPT

## 2023-06-08 PROCEDURE — 36415 COLL VENOUS BLD VENIPUNCTURE: CPT

## 2023-06-08 PROCEDURE — 99284 EMERGENCY DEPT VISIT MOD MDM: CPT | Mod: 25

## 2023-06-08 PROCEDURE — 76705 ECHO EXAM OF ABDOMEN: CPT

## 2023-06-08 PROCEDURE — 96375 TX/PRO/DX INJ NEW DRUG ADDON: CPT

## 2023-06-08 PROCEDURE — 96376 TX/PRO/DX INJ SAME DRUG ADON: CPT

## 2023-06-08 PROCEDURE — 80053 COMPREHEN METABOLIC PANEL: CPT

## 2023-06-08 PROCEDURE — 85610 PROTHROMBIN TIME: CPT

## 2023-06-08 PROCEDURE — 76705 ECHO EXAM OF ABDOMEN: CPT | Mod: 26

## 2023-06-08 PROCEDURE — 96374 THER/PROPH/DIAG INJ IV PUSH: CPT

## 2023-06-08 PROCEDURE — 85730 THROMBOPLASTIN TIME PARTIAL: CPT

## 2023-06-08 PROCEDURE — 86900 BLOOD TYPING SEROLOGIC ABO: CPT

## 2023-06-08 PROCEDURE — 86850 RBC ANTIBODY SCREEN: CPT

## 2023-06-08 RX ORDER — FAMOTIDINE 10 MG/ML
20 INJECTION INTRAVENOUS ONCE
Refills: 0 | Status: COMPLETED | OUTPATIENT
Start: 2023-06-08 | End: 2023-06-08

## 2023-06-08 RX ORDER — MORPHINE SULFATE 50 MG/1
4 CAPSULE, EXTENDED RELEASE ORAL ONCE
Refills: 0 | Status: DISCONTINUED | OUTPATIENT
Start: 2023-06-08 | End: 2023-06-08

## 2023-06-08 RX ORDER — SUCRALFATE 1 G
1 TABLET ORAL
Qty: 28 | Refills: 0
Start: 2023-06-08 | End: 2023-06-14

## 2023-06-08 RX ORDER — SODIUM CHLORIDE 9 MG/ML
1000 INJECTION INTRAMUSCULAR; INTRAVENOUS; SUBCUTANEOUS ONCE
Refills: 0 | Status: COMPLETED | OUTPATIENT
Start: 2023-06-08 | End: 2023-06-08

## 2023-06-08 RX ORDER — LIDOCAINE 4 G/100G
10 CREAM TOPICAL ONCE
Refills: 0 | Status: COMPLETED | OUTPATIENT
Start: 2023-06-08 | End: 2023-06-08

## 2023-06-08 RX ADMIN — FAMOTIDINE 20 MILLIGRAM(S): 10 INJECTION INTRAVENOUS at 21:55

## 2023-06-08 RX ADMIN — MORPHINE SULFATE 4 MILLIGRAM(S): 50 CAPSULE, EXTENDED RELEASE ORAL at 21:55

## 2023-06-08 RX ADMIN — Medication 30 MILLILITER(S): at 21:55

## 2023-06-08 RX ADMIN — SODIUM CHLORIDE 1000 MILLILITER(S): 9 INJECTION INTRAMUSCULAR; INTRAVENOUS; SUBCUTANEOUS at 18:13

## 2023-06-08 RX ADMIN — LIDOCAINE 10 MILLILITER(S): 4 CREAM TOPICAL at 21:56

## 2023-06-08 RX ADMIN — MORPHINE SULFATE 4 MILLIGRAM(S): 50 CAPSULE, EXTENDED RELEASE ORAL at 18:14

## 2023-06-08 RX ADMIN — MORPHINE SULFATE 4 MILLIGRAM(S): 50 CAPSULE, EXTENDED RELEASE ORAL at 23:21

## 2023-06-08 NOTE — ED PROVIDER NOTE - NSFOLLOWUPINSTRUCTIONS_ED_ALL_ED_FT
Biliary Colic, Adult    Biliary colic is severe pain caused by a problem with the gallbladder. The gallbladder is a small organ in the upper right part of the abdomen. The gallbladder stores a digestive fluid produced in the liver (bile) that helps the body break down fat. Bile and other digestive enzymes are carried from the liver to the small intestine through tube-like structures called bile ducts. The gallbladder and the bile ducts form the biliary tract.    Sometimes, hard deposits of digestive fluids (gallstones) form in the gallbladder and block the flow of bile from the gallbladder, causing biliary colic. This condition is also called a gallbladder attack. Gallstones can be as small as a grain of sand or as big as a golf ball. There could be just one gallstone in the gallbladder, or there could be many.    What are the causes?  This condition is usually caused by gallstones. Less often, a tumor could block the flow of bile from the gallbladder and trigger biliary colic.    What increases the risk?  The following factors may make you more likely to develop this condition:  Being female.  Having a family history of gallstones.  Being obese.  Losing weight suddenly or quickly.  Eating a diet that is high in calories, low in fiber, and rich in refined carbohydrates, such as white bread and white rice.  Having certain health conditions, such as:  An intestinal disease that affects nutrient absorption, such as Crohn's disease.  A metabolic condition, such as diabetes or metabolic syndrome. Metabolic syndrome occurs when someone has high blood pressure, high cholesterol, and diabetes.  A blood condition, such as hemolytic anemia or sickle cell disease.  What are the signs or symptoms?  The main symptom of this condition is severe pain in the upper right side of the abdomen. You may feel this pain below the chest but above the hip. This pain often occurs at night or after eating a meal that is high in fat. This pain may get worse for up to an hour and last as long as 12 hours. In most cases, the pain fades (subsides) within 2 hours.    Other symptoms of this condition include:  Nausea and vomiting.  Pain under the right shoulder.  How is this diagnosed?  This condition is diagnosed based on your medical history, your symptoms, and a physical exam.    You may also have tests, including:  Blood tests to rule out infection or inflammation of the bile ducts, gallbladder, pancreas, or liver.  Imaging studies, such as:  An ultrasound.  A CT scan.  An MRI.  In some cases, you may need to have an imaging study done using a small amount of radioactive material (nuclear medicine) to confirm the diagnosis.    How is this treated?  This condition may be treated with medicines to:  Relieve your pain or nausea.  Dissolve the gallstones. It may take months or years before the gallstones are completely gone.  If you have gallstones, or if you have a tumor in the gallbladder that is causing biliary colic, you may need surgery to remove the gallbladder (cholecystectomy).    Follow these instructions at home:  Eating and drinking    Drink enough fluid to keep your urine pale yellow.  Follow instructions from your health care provider about eating or drinking restrictions. These may include avoiding:  Fatty, greasy, and fried foods.  Any foods that make the pain worse.  Overeating.  Having a large meal after not eating for a while.  General instructions    Take over-the-counter and prescription medicines only as told by your health care provider.  Keep all follow-up visits as told by your health care provider. This is important.  How is this prevented?  Steps to prevent this condition include:  Maintaining a healthy body weight.  Getting regular exercise.  Eating a healthy diet that is high in fiber and low in fat.  Limiting how much sugar and refined carbohydrates you eat.  Contact a health care provider if:  Your pain lasts more than 5 hours.  You vomit.  You have a fever and chills.  Your pain gets worse.  Get help right away if:  Your skin or the whites of your eyes look yellow (jaundice).  Your have tea-colored urine and light-colored stools (feces).  You are dizzy or you faint.  Summary  Biliary colic is severe pain caused by a problem with the gallbladder. The gallbladder is a small organ in the upper right part of your abdomen.  Treatment for this condition may include medicine to relieve your pain or nausea, or medicine to slowly dissolve the gallstones.  If you have gallstones, or if you have a tumor in the gallbladder that is causing biliary colic, you may need surgery to remove the gallbladder (cholecystectomy).  This information is not intended to replace advice given to you by your health care provider. Make sure you discuss any questions you have with your health care provider.    Document Revised: 12/29/2020 Document Reviewed:

## 2023-06-08 NOTE — ED PROVIDER NOTE - PATIENT PORTAL LINK FT
You can access the FollowMyHealth Patient Portal offered by [facility] by registering at the following website: [email or web address]. By joining [employer]’s FollowMyHealth portal, you will also be able to view your health information using other applications (apps) compatible with our system.

## 2023-06-08 NOTE — ED PROVIDER NOTE - OBJECTIVE STATEMENT
62 year old male PMH DM, gallstones, HLD, HTN, hx PE, abd cancer coming in with recurrent RUQ abd pain that started last night. states feels similar to prior gallbladder pain episodes. states also with an episode of N/V. denies all other complaints.

## 2023-06-08 NOTE — ED PROVIDER NOTE - PROGRESS NOTE DETAILS
labs are unremarkable. US with gallstones but no cholecystitis. given pain meds and GI cocktail. feels improved. tolerating PO without issue. will dc. f/u with gen surg and PMD. return precautions discussed.

## 2023-06-08 NOTE — ED ADULT NURSE NOTE - NSHOSCREENINGQ1_ED_ALL_ED
[Alert] : alert [Well Nourished] : well nourished [Obese] : obese [No Acute Distress] : no acute distress [Well Developed] : well developed [Normal Sclera/Conjunctiva] : normal sclera/conjunctiva [EOMI] : extra ocular movement intact [No Proptosis] : no proptosis [Normal Oropharynx] : the oropharynx was normal [Thyroid Not Enlarged] : the thyroid was not enlarged [No Thyroid Nodules] : no palpable thyroid nodules [No Respiratory Distress] : no respiratory distress [No Accessory Muscle Use] : no accessory muscle use [Clear to Auscultation] : lungs were clear to auscultation bilaterally [Normal S1, S2] : normal S1 and S2 [Normal Rate] : heart rate was normal [Regular Rhythm] : with a regular rhythm [No Edema] : no peripheral edema [Pedal Pulses Normal] : the pedal pulses are present [Normal Bowel Sounds] : normal bowel sounds [Not Tender] : non-tender [Not Distended] : not distended [Soft] : abdomen soft [Normal Anterior Cervical Nodes] : no anterior cervical lymphadenopathy [Spine Straight] : spine straight [Normal Gait] : normal gait [No Tremors] : no tremors [Oriented x3] : oriented to person, place, and time [Acanthosis Nigricans] : no acanthosis nigricans No

## 2023-06-08 NOTE — ED PROVIDER NOTE - CLINICAL SUMMARY MEDICAL DECISION MAKING FREE TEXT BOX
62 year old male with RUQ abd pain and hx gallstones. PE as above.  labs, pain control, US GB, reassess

## 2023-06-09 ENCOUNTER — NON-APPOINTMENT (OUTPATIENT)
Age: 63
End: 2023-06-09

## 2023-06-11 NOTE — HISTORY OF PRESENT ILLNESS
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 \par \par 2019 -- surgery and chemo for appendiceal adenocarcinoma.\par 1/2019 -- pathology showed Final Diagnosis\par 1. Descending colon, polyp 1, Endoscopic biopsy: - Tubular adenoma\par 2. Cecum, polyp, endoscopic biopsy: - Hyperplastic polyp\par 3. Cecum, endoscopic biopsy: - Cecal mucosa without significant histologic findings\par 4. Terminal ileum, endoscopic biopsy: - Terminal ileal mucosa with preserved villous architecture and no significant intraepithelial lymphocytosis\par 5. Descending colon, polyp 2, endoscopic biopsy: - Tubular adenoma\par \par 2/2019 -- pathology showed Final Diagnosis\par 1-Falciform ligament, excision: - Metastatic low grade mucinous adenocarcinoma (M1).\par 2-Peritoneum, omentum, resection: - Metastatic low grade mucinous adenocarcinoma (M1).\par 3-Spleen, resection: - Perisplenic soft tissue with metastatic low grade mucinous adenocarcinoma (M1). - Splenic parenchyma is uninvolved. 4-Right colon with lymph node, resection:- Low- and focal high-grade mucinous adenocarcinoma, arising in a low-grade appendiceal mucinous neoplasm (LAMN), 9 x 5.5 x4.5 cm. - Tumor is perforated (T4a). - Sixteen lymph nodes are negative for metastatic adenocarcinoma. - Tumor deposits identified (N1c). - Lymphovascular and perineural invasion is not identified. - Resection margins are free of tumor. - Pathologic staging (AJCC 8th edition): [id number].\par \par 4/2023 -- CT scan showed RLL lung mass\par \par 5/5/2023 -- Consult. on oxycontin for abd pain at times. No SOB, chest pain, headache. Pt refused biopsy to RLL lung mass. \par \par 6/9/23 OTV. 5/5 fractions of radiation for lung mass completed. No cough, chest pain, headache. \par

## 2023-06-11 NOTE — DISEASE MANAGEMENT
[Pathological] : TNM Stage: p [IV] : IV [TTNM] : 4a [NTNM] : 1c [MTNM] : 1b [de-identified] : 50 Gy [de-identified] : right lung mass

## 2023-06-11 NOTE — REVIEW OF SYSTEMS
[Dysphagia: Grade 0] : Dysphagia: Grade 0 [Cough: Grade 0] : Cough: Grade 0 [Alopecia: Grade 0] : Alopecia: Grade 0 [Pruritus: Grade 0] : Pruritus: Grade 0 [Skin Atrophy: Grade 0] : Skin Atrophy: Grade 0 [Skin Hyperpigmentation: Grade 0] : Skin Hyperpigmentation: Grade 0 [Skin Induration: Grade 0] : Skin Induration: Grade 0 [Dermatitis Radiation: Grade 0] : Dermatitis Radiation: Grade 0 [Negative] : Allergic/Immunologic [FreeTextEntry5] : HTN, HLD [FreeTextEntry6] : h/o PE [FreeTextEntry7] : h/o chemo and surgery for appendix adenocarcinoma [de-identified] : h/o thyroid surgery

## 2023-06-14 NOTE — PROGRESS NOTE ADULT - ASSESSMENT
Patient is a 61y old Male s/p back excision with wound vac placement    Plan:  - dispo planning  - maintain wound vac  - resume diet. 61M s/p back excision with wound vac placement    Plan:  - Wound vac ordered for home  - Regular diet  - Discharge today pending wound vac/VNS    D Surgery  [id number] 61M s/p back excision with wound vac placement    Plan:  - Wound vac ordered for home  - restart eliquis POD3  - Regular diet  - Discharge today pending wound vac/VNS    D Surgery  [id number] Double Z Plasty Text: The lesion was extirpated to the level of the fat with a #15 scalpel blade. Given the location of the defect, shape of the defect and the proximity to free margins a double Z-plasty was deemed most appropriate for repair. Using a sterile surgical marker, the appropriate transposition arms of the double Z-plasty were drawn incorporating the defect and placing the expected incisions within the relaxed skin tension lines where possible. The area thus outlined was incised deep to adipose tissue with a #15 scalpel blade. The skin margins were undermined to an appropriate distance in all directions utilizing iris scissors. The opposing transposition arms were then transposed and carried over into place in opposite direction and anchored with interrupted buried subcutaneous sutures.

## 2023-06-29 NOTE — H&P PST ADULT - ASSESSMENT
Secondary malignant neoplasm of retroperitoneum and peritoneum Malignant Neoplasm Topical Sulfur Applications Counseling: Topical Sulfur Counseling: Patient counseled that this medication may cause skin irritation or allergic reactions.  In the event of skin irritation, the patient was advised to reduce the amount of the drug applied or use it less frequently.   The patient verbalized understanding of the proper use and possible adverse effects of topical sulfur application.  All of the patient's questions and concerns were addressed.

## 2023-07-25 ENCOUNTER — NON-APPOINTMENT (OUTPATIENT)
Age: 63
End: 2023-07-25

## 2023-08-01 ENCOUNTER — APPOINTMENT (OUTPATIENT)
Dept: RADIATION ONCOLOGY | Facility: CLINIC | Age: 63
End: 2023-08-01
Payer: MEDICARE

## 2023-08-01 PROCEDURE — 99024 POSTOP FOLLOW-UP VISIT: CPT

## 2023-08-04 ENCOUNTER — EMERGENCY (EMERGENCY)
Facility: HOSPITAL | Age: 63
LOS: 1 days | Discharge: HOME | End: 2023-08-04
Attending: STUDENT IN AN ORGANIZED HEALTH CARE EDUCATION/TRAINING PROGRAM
Payer: MEDICARE

## 2023-08-04 VITALS
TEMPERATURE: 98 F | HEART RATE: 105 BPM | WEIGHT: 130.07 LBS | SYSTOLIC BLOOD PRESSURE: 171 MMHG | OXYGEN SATURATION: 99 % | HEIGHT: 66 IN | DIASTOLIC BLOOD PRESSURE: 101 MMHG | RESPIRATION RATE: 19 BRPM

## 2023-08-04 VITALS
OXYGEN SATURATION: 98 % | TEMPERATURE: 98 F | RESPIRATION RATE: 18 BRPM | DIASTOLIC BLOOD PRESSURE: 74 MMHG | HEART RATE: 87 BPM | SYSTOLIC BLOOD PRESSURE: 135 MMHG

## 2023-08-04 DIAGNOSIS — Z98.890 OTHER SPECIFIED POSTPROCEDURAL STATES: Chronic | ICD-10-CM

## 2023-08-04 LAB
ALBUMIN SERPL ELPH-MCNC: 2.9 G/DL — LOW (ref 3.5–5)
ALP SERPL-CCNC: 484 U/L — HIGH (ref 40–120)
ALT FLD-CCNC: 64 U/L DA — HIGH (ref 10–60)
ANION GAP SERPL CALC-SCNC: 6 MMOL/L — SIGNIFICANT CHANGE UP (ref 5–17)
ANISOCYTOSIS BLD QL: SLIGHT — SIGNIFICANT CHANGE UP
AST SERPL-CCNC: 77 U/L — HIGH (ref 10–40)
BASOPHILS # BLD AUTO: 0.03 K/UL — SIGNIFICANT CHANGE UP (ref 0–0.2)
BASOPHILS NFR BLD AUTO: 0.4 % — SIGNIFICANT CHANGE UP (ref 0–2)
BILIRUB SERPL-MCNC: 0.4 MG/DL — SIGNIFICANT CHANGE UP (ref 0.2–1.2)
BUN SERPL-MCNC: 12 MG/DL — SIGNIFICANT CHANGE UP (ref 7–18)
CALCIUM SERPL-MCNC: 9.2 MG/DL — SIGNIFICANT CHANGE UP (ref 8.4–10.5)
CHLORIDE SERPL-SCNC: 104 MMOL/L — SIGNIFICANT CHANGE UP (ref 96–108)
CO2 SERPL-SCNC: 28 MMOL/L — SIGNIFICANT CHANGE UP (ref 22–31)
CREAT SERPL-MCNC: 0.76 MG/DL — SIGNIFICANT CHANGE UP (ref 0.5–1.3)
EGFR: 100 ML/MIN/1.73M2 — SIGNIFICANT CHANGE UP
EOSINOPHIL # BLD AUTO: 0.01 K/UL — SIGNIFICANT CHANGE UP (ref 0–0.5)
EOSINOPHIL NFR BLD AUTO: 0.1 % — SIGNIFICANT CHANGE UP (ref 0–6)
GLUCOSE SERPL-MCNC: 149 MG/DL — HIGH (ref 70–99)
HCT VFR BLD CALC: 35.4 % — LOW (ref 39–50)
HGB BLD-MCNC: 11.3 G/DL — LOW (ref 13–17)
IMM GRANULOCYTES NFR BLD AUTO: 0.2 % — SIGNIFICANT CHANGE UP (ref 0–0.9)
LACTATE SERPL-SCNC: 1.2 MMOL/L — SIGNIFICANT CHANGE UP (ref 0.7–2)
LACTATE SERPL-SCNC: 5.1 MMOL/L — CRITICAL HIGH (ref 0.7–2)
LG PLATELETS BLD QL AUTO: SLIGHT — SIGNIFICANT CHANGE UP
LIDOCAIN IGE QN: 44 U/L — LOW (ref 73–393)
LYMPHOCYTES # BLD AUTO: 1.39 K/UL — SIGNIFICANT CHANGE UP (ref 1–3.3)
LYMPHOCYTES # BLD AUTO: 16.8 % — SIGNIFICANT CHANGE UP (ref 13–44)
MACROCYTES BLD QL: SIGNIFICANT CHANGE UP
MANUAL SMEAR VERIFICATION: SIGNIFICANT CHANGE UP
MCHC RBC-ENTMCNC: 31.9 GM/DL — LOW (ref 32–36)
MCHC RBC-ENTMCNC: 35.3 PG — HIGH (ref 27–34)
MCV RBC AUTO: 110.6 FL — HIGH (ref 80–100)
MONOCYTES # BLD AUTO: 0.9 K/UL — SIGNIFICANT CHANGE UP (ref 0–0.9)
MONOCYTES NFR BLD AUTO: 10.9 % — SIGNIFICANT CHANGE UP (ref 2–14)
NEUTROPHILS # BLD AUTO: 5.93 K/UL — SIGNIFICANT CHANGE UP (ref 1.8–7.4)
NEUTROPHILS NFR BLD AUTO: 71.6 % — SIGNIFICANT CHANGE UP (ref 43–77)
NRBC # BLD: 0 /100 WBCS — SIGNIFICANT CHANGE UP (ref 0–0)
OVALOCYTES BLD QL SMEAR: SLIGHT — SIGNIFICANT CHANGE UP
PLAT MORPH BLD: NORMAL — SIGNIFICANT CHANGE UP
PLATELET # BLD AUTO: 196 K/UL — SIGNIFICANT CHANGE UP (ref 150–400)
PLATELET COUNT - ESTIMATE: ABNORMAL
POTASSIUM SERPL-MCNC: 3.9 MMOL/L — SIGNIFICANT CHANGE UP (ref 3.5–5.3)
POTASSIUM SERPL-SCNC: 3.9 MMOL/L — SIGNIFICANT CHANGE UP (ref 3.5–5.3)
PROT SERPL-MCNC: 9.5 G/DL — HIGH (ref 6–8.3)
RBC # BLD: 3.2 M/UL — LOW (ref 4.2–5.8)
RBC # FLD: 18 % — HIGH (ref 10.3–14.5)
RBC BLD AUTO: ABNORMAL
SODIUM SERPL-SCNC: 138 MMOL/L — SIGNIFICANT CHANGE UP (ref 135–145)
WBC # BLD: 8.28 K/UL — SIGNIFICANT CHANGE UP (ref 3.8–10.5)
WBC # FLD AUTO: 8.28 K/UL — SIGNIFICANT CHANGE UP (ref 3.8–10.5)

## 2023-08-04 PROCEDURE — 80053 COMPREHEN METABOLIC PANEL: CPT

## 2023-08-04 PROCEDURE — 85025 COMPLETE CBC W/AUTO DIFF WBC: CPT

## 2023-08-04 PROCEDURE — 83690 ASSAY OF LIPASE: CPT

## 2023-08-04 PROCEDURE — 74174 CTA ABD&PLVS W/CONTRAST: CPT | Mod: 26,MA

## 2023-08-04 PROCEDURE — 83605 ASSAY OF LACTIC ACID: CPT

## 2023-08-04 PROCEDURE — 36415 COLL VENOUS BLD VENIPUNCTURE: CPT

## 2023-08-04 PROCEDURE — 99285 EMERGENCY DEPT VISIT HI MDM: CPT

## 2023-08-04 PROCEDURE — 74174 CTA ABD&PLVS W/CONTRAST: CPT | Mod: MA

## 2023-08-04 PROCEDURE — 99284 EMERGENCY DEPT VISIT MOD MDM: CPT | Mod: 25

## 2023-08-04 RX ORDER — SODIUM CHLORIDE 9 MG/ML
1000 INJECTION INTRAMUSCULAR; INTRAVENOUS; SUBCUTANEOUS ONCE
Refills: 0 | Status: COMPLETED | OUTPATIENT
Start: 2023-08-04 | End: 2023-08-04

## 2023-08-04 RX ADMIN — SODIUM CHLORIDE 1000 MILLILITER(S): 9 INJECTION INTRAMUSCULAR; INTRAVENOUS; SUBCUTANEOUS at 19:36

## 2023-08-04 NOTE — ED PROVIDER NOTE - OBJECTIVE STATEMENT
63-year-old male hx of DM, gallstones, HLD, HTN, hx PE, appendiceal cancer, presenting with constipation for the past day. Notes that because of his oxycontin he suffers from constipation at baseline, takes Senna for this twice a day, but last night he skipped his evening dose. Today he has been constipated - pooped a little but still feels like he needs to poop further. Denies any abdominal pain, nausea, vomiting, or any other concerning symptoms.

## 2023-08-04 NOTE — ED PROVIDER NOTE - CLINICAL SUMMARY MEDICAL DECISION MAKING FREE TEXT BOX
63-year-old male hx of DM, gallstones, HLD, HTN, hx PE, appendiceal cancer, presenting with constipation for the past day. Notes that because of his oxycontin he suffers from constipation at baseline, takes Senna for this twice a day, but last night he skipped his evening dose. Will check labs and CTAP to r/o obstruction, and reassess.

## 2023-08-04 NOTE — ED PROVIDER NOTE - PATIENT PORTAL LINK FT
You can access the FollowMyHealth Patient Portal offered by [facility] by registering at the following website: [email or web address]. By joining [employer]’s FollowMyHealth portal, you will also be able to view your health information using other applications (apps) compatible with our system.

## 2023-08-04 NOTE — ED PROVIDER NOTE - PROGRESS NOTE DETAILS
labs ok other than lactate 5 --> trended down to 1 after IVF  CTA abd/pelvis stable from prior  Feels better - pooped in the ER. Will discharge.

## 2023-08-04 NOTE — ED ADULT NURSE NOTE - NSFALLUNIVINTERV_ED_ALL_ED
Bed/Stretcher in lowest position, wheels locked, appropriate side rails in place/Call bell, personal items and telephone in reach/Instruct patient to call for assistance before getting out of bed/chair/stretcher/Non-slip footwear applied when patient is off stretcher/[place] to call system/Physically safe environment - no spills, clutter or unnecessary equipment/Purposeful proactive rounding/Room/bathroom lighting operational, light cord in reach

## 2023-08-04 NOTE — ED PROVIDER NOTE - NSFOLLOWUPINSTRUCTIONS_ED_ALL_ED_FT
You were seen in the emergency department for: constipation  Your results report is attached.  Please follow up with your doctors as soon as possible.     Please return to the Emergency Department if you experience any of the following symptoms:   - Shortness of breath or trouble breathing  - Pressure, pain or tightness in the chest  - Face drooping, arm weakness or speech difficulty  - Persistence of severe vomiting  - Head injury or loss of consciousness  - Nonstop bleeding or an open wound    (1) Follow up with your primary care physician within the next 24-48 hours as discussed. In addition, we did not find evidence of a life threatening illness on your testing here today, but listed below are the specialists that will be necessary to see as an outpatient to continue the workup.  Please call the numbers listed below or [phone] to set up the necessary appointments.  (2) Take Tylenol (up to 1000mg or 1 g)  and/or Motrin (up to 600mg) up to every 6 hours as needed for pain.   (3) If you had an IV (intravenous) line placed, it was removed. Sometimes, after IV removal, that area can be tender for a few days; if it develops redness and swelling, those could be signs of infection; in which case, return to the Emergency Department for assessment.  (4) Please continue taking all of your home medications as directed.

## 2023-08-08 NOTE — DISEASE MANAGEMENT
[Pathological] : TNM Stage: p [IV] : IV [TTNM] : 4a [NTNM] : 1c [MTNM] : 1b [de-identified] : 50 Gy [de-identified] : right lung mass

## 2023-08-08 NOTE — REVIEW OF SYSTEMS
[Negative] : Allergic/Immunologic [Dysphagia: Grade 0] : Dysphagia: Grade 0 [Cough: Grade 0] : Cough: Grade 0 [Alopecia: Grade 0] : Alopecia: Grade 0 [Pruritus: Grade 0] : Pruritus: Grade 0 [Skin Atrophy: Grade 0] : Skin Atrophy: Grade 0 [Skin Hyperpigmentation: Grade 0] : Skin Hyperpigmentation: Grade 0 [Skin Induration: Grade 0] : Skin Induration: Grade 0 [Dermatitis Radiation: Grade 0] : Dermatitis Radiation: Grade 0 [FreeTextEntry5] : HTN, HLD [FreeTextEntry6] : h/o PE [FreeTextEntry7] : h/o chemo and surgery for appendix adenocarcinoma [de-identified] : h/o thyroid surgery

## 2023-08-08 NOTE — HISTORY OF PRESENT ILLNESS
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   2019 -- surgery and chemo for appendiceal adenocarcinoma. 1/2019 -- pathology showed Final Diagnosis 1. Descending colon, polyp 1, Endoscopic biopsy: - Tubular adenoma 2. Cecum, polyp, endoscopic biopsy: - Hyperplastic polyp 3. Cecum, endoscopic biopsy: - Cecal mucosa without significant histologic findings 4. Terminal ileum, endoscopic biopsy: - Terminal ileal mucosa with preserved villous architecture and no significant intraepithelial lymphocytosis 5. Descending colon, polyp 2, endoscopic biopsy: - Tubular adenoma  2/2019 -- pathology showed Final Diagnosis 1-Falciform ligament, excision: - Metastatic low grade mucinous adenocarcinoma (M1). 2-Peritoneum, omentum, resection: - Metastatic low grade mucinous adenocarcinoma (M1). 3-Spleen, resection: - Perisplenic soft tissue with metastatic low grade mucinous adenocarcinoma (M1). - Splenic parenchyma is uninvolved. 4-Right colon with lymph node, resection:- Low- and focal high-grade mucinous adenocarcinoma, arising in a low-grade appendiceal mucinous neoplasm (LAMN), 9 x 5.5 x4.5 cm. - Tumor is perforated (T4a). - Sixteen lymph nodes are negative for metastatic adenocarcinoma. - Tumor deposits identified (N1c). - Lymphovascular and perineural invasion is not identified. - Resection margins are free of tumor. - Pathologic staging (AJCC 8th edition): [id number].  4/2023 -- CT scan showed RLL lung mass  5/5/2023 -- Consult. on oxycontin for abd pain at times. No SOB, chest pain, headache. Pt refused biopsy to RLL lung mass.   6/9/23 OTV. 5/5 fractions of radiation for lung mass completed. No cough, chest pain, headache.   8/1/2023 F/U. Pt completed 50 Nasim / 5 Fx of radiation for right lung mass in 6/2023. No cough, pain, dysphagia. Still on chemo with Dr Hernandez @ [facility].

## 2023-08-08 NOTE — ASSESSMENT
[Work-up necessary to assess local, regional or metastatic recurrence] : Work-up necessary to assess local, regional or metastatic recurrence [FreeTextEntry1] : Stable

## 2023-08-10 NOTE — PATIENT PROFILE ADULT - NSPROEXTENSIONSOFSELF_GEN_A_NUR
Detail Level: Detailed Add 27173 Cpt? (Important Note: In 2017 The Use Of 62457 Is Being Tracked By Cms To Determine Future Global Period Reimbursement For Global Periods): no none

## 2023-08-18 ENCOUNTER — OUTPATIENT (OUTPATIENT)
Dept: OUTPATIENT SERVICES | Facility: HOSPITAL | Age: 63
LOS: 1 days | Discharge: HOME | End: 2023-08-18

## 2023-08-18 DIAGNOSIS — Z98.890 OTHER SPECIFIED POSTPROCEDURAL STATES: Chronic | ICD-10-CM

## 2023-08-21 ENCOUNTER — APPOINTMENT (OUTPATIENT)
Dept: MRI IMAGING | Facility: HOSPITAL | Age: 63
End: 2023-08-21

## 2023-08-29 DIAGNOSIS — C18.9 MALIGNANT NEOPLASM OF COLON, UNSPECIFIED: ICD-10-CM

## 2023-09-29 ENCOUNTER — APPOINTMENT (OUTPATIENT)
Dept: CARDIOLOGY | Facility: CLINIC | Age: 63
End: 2023-09-29

## 2023-11-08 ENCOUNTER — APPOINTMENT (OUTPATIENT)
Dept: RADIATION ONCOLOGY | Facility: CLINIC | Age: 63
End: 2023-11-08

## 2023-11-14 ENCOUNTER — APPOINTMENT (OUTPATIENT)
Dept: CARDIOLOGY | Facility: CLINIC | Age: 63
End: 2023-11-14

## 2024-01-09 NOTE — ED PROVIDER NOTE - CONSTITUTIONAL, MLM
normal... Well appearing, awake, alert, oriented to person, place, time/situation and in no apparent distress. ambulatory

## 2024-02-02 NOTE — ASU PATIENT PROFILE, ADULT - MUTUALITY COMMENT, PROFILE
Start omeprazole once daily     Continue to avoid ibuprofen    [facility]  [address]  [address]  [place] [address]  [phone] - appt line      
none

## 2024-02-14 NOTE — H&P PST ADULT - CARDIOVASCULAR
Detail Level: Detailed Depth Of Biopsy: dermis Was A Bandage Applied: Yes Size Of Lesion In Cm: 0 Biopsy Type: H and E Biopsy Method: Dermablade Anesthesia Type: 1% lidocaine with epinephrine Anesthesia Volume In Cc: 0.5 Hemostasis: Drysol Wound Care: Petrolatum Dressing: bandage Destruction After The Procedure: No Type Of Destruction Used: Curettage details… Curettage Text: The wound bed was treated with curettage after the biopsy was performed. Cryotherapy Text: The wound bed was treated with cryotherapy after the biopsy was performed. Electrodesiccation Text: The wound bed was treated with electrodesiccation after the biopsy was performed. Electrodesiccation And Curettage Text: Billing Type: Third-Party Bill Information: Selecting Yes will display possible errors in your note based on the variables you have selected. This validation is only offered as a suggestion for you. PLEASE NOTE THAT THE VALIDATION TEXT WILL BE REMOVED WHEN YOU FINALIZE YOUR NOTE. IF YOU WANT TO FAX A PRELIMINARY NOTE YOU WILL NEED TO TOGGLE THIS TO 'NO' IF YOU DO NOT WANT IT IN YOUR FAXED NOTE. detailed exam

## 2024-06-14 NOTE — REASON FOR VISIT
Since birth, patient is adopted.  It is unknown why he has the mental delay if it is due to anoxic brain injury or cerebral palsy.    Mother Feels very overwhelmed at this point in time and recommend that he go to subacute rehab first before transitioning home after 1 to 2 weeks  Also place consult to palliative care on discharge to rehab   [FreeTextEntry2] : sebaceous cyst wound care

## 2024-08-06 NOTE — H&P PST ADULT - GASTROINTESTINAL
Detail Level: Detailed Quality 130: Documentation Of Current Medications In The Medical Record: Current Medications Documented Quality 431: Preventive Care And Screening: Unhealthy Alcohol Use - Screening: Patient not identified as an unhealthy alcohol user when screened for unhealthy alcohol use using a systematic screening method Quality 47: Advance Care Plan: Advance care planning not documented, reason not otherwise specified. Quality 226: Preventive Care And Screening: Tobacco Use: Screening And Cessation Intervention: Patient screened for tobacco use and is an ex/non-smoker detailed exam details…

## 2024-11-13 NOTE — DISEASE MANAGEMENT
Remote Patient Monitoring Note      Date/Time:  11/13/2024 3:14 PM    LPN contacted patient by telephone regarding yellow alert received for no BP metrics for 4 days  .     Background:  High Blood-Pressure, CHF, Kidney Disease     Clinical Interventions: Patient checked her records and realized she had forgotten to take the BP for the past 4 days. She is agreeable to resume.    Plan/Follow Up: Will continue to review, monitor and address alerts with follow up based on severity of symptoms and risk factors.       Radha De Leon LPN  [facility]/ CTN/ Remote Patient Monitoring  [phone]      [TTNM] : 4a [NTNM] : 1c [MTNM] : 1b

## 2025-06-26 NOTE — H&P PST ADULT - DOES PATIENT MEET CRITERIA FOR SEPSIS
Patient with several weeks of symptomatic bradycardia (as low as 30s) a/w SOB, GREEN, lethargy, weakness  EP consulted  Monitor on Telemetry  Replete K to 4, Mg to 2  TTE recently done outpatient, will need results  NPO after MN for PPM placement No

## (undated) DEVICE — DRAPE 1/2 SHEET 40X57"

## (undated) DEVICE — SPONGE PEANUT AUTO COUNT

## (undated) DEVICE — PACK MAJOR ABDOMINAL WITH LAP

## (undated) DEVICE — SUT VICRYL 3-0 27" SH UNDYED

## (undated) DEVICE — LIGASURE EXACT DISSECTOR

## (undated) DEVICE — BASIN SET DOUBLE

## (undated) DEVICE — SUT MONOCRYL 4-0 27" PS-2 UNDYED

## (undated) DEVICE — SYR LUER LOK 10CC

## (undated) DEVICE — SOL IRR POUR NS 0.9% 500ML

## (undated) DEVICE — DRSG SENSA TRAC SMALL

## (undated) DEVICE — STAPLER SKIN VISI-STAT 35 WIDE

## (undated) DEVICE — DRSG STERISTRIPS 0.5 X 4"

## (undated) DEVICE — WARMING BLANKET LOWER ADULT

## (undated) DEVICE — CANISTER KCI 500ML GEL SENSA TRAC

## (undated) DEVICE — PREP CHLORAPREP HI-LITE ORANGE 26ML

## (undated) DEVICE — DRAPE CHEST BREAST 106" X 122"

## (undated) DEVICE — ELCTR GROUNDING PAD ADULT COVIDIEN

## (undated) DEVICE — DRAPE INSTRUMENT POUCH 6.75" X 11"

## (undated) DEVICE — DRAPE 3/4 SHEET W REINFORCEMENT 56X77"

## (undated) DEVICE — SPECIMEN CONTAINER 100ML

## (undated) DEVICE — LABELS BLANK W PEN

## (undated) DEVICE — POSITIONER FOAM EGG CRATE ULNAR 2PCS (PINK)

## (undated) DEVICE — VENODYNE/SCD SLEEVE CALF MEDIUM

## (undated) DEVICE — SUCTION YANKAUER NO CONTROL VENT

## (undated) DEVICE — NDL HYPO REGULAR BEVEL 25G X 1.5" (BLUE)

## (undated) DEVICE — ELCTR BOVIE TIP BLADE INSULATED 2.8" EDGE WITH SAFETY

## (undated) DEVICE — LIGASURE SMALL JAW

## (undated) DEVICE — LIJ/LIA-ESU VALLEYLAB FORCETRIAD T2D28932EX: Type: DURABLE MEDICAL EQUIPMENT